# Patient Record
Sex: MALE | Race: BLACK OR AFRICAN AMERICAN | NOT HISPANIC OR LATINO | Employment: OTHER | URBAN - METROPOLITAN AREA
[De-identification: names, ages, dates, MRNs, and addresses within clinical notes are randomized per-mention and may not be internally consistent; named-entity substitution may affect disease eponyms.]

---

## 2020-07-01 ENCOUNTER — HOSPITAL ENCOUNTER (OUTPATIENT)
Dept: OTHER | Facility: HOSPITAL | Age: 65
Setting detail: RECURRING SERIES
Discharge: HOME | End: 2020-08-06
Attending: EMERGENCY MEDICINE

## 2020-07-27 ENCOUNTER — HOSPITAL ENCOUNTER (OUTPATIENT)
Dept: MRI IMAGING | Facility: HOSPITAL | Age: 65
Discharge: HOME | End: 2020-07-27
Attending: EMERGENCY MEDICINE

## 2020-08-07 ENCOUNTER — OFFICE VISIT CONVERTED (OUTPATIENT)
Dept: ORTHOPEDIC SURGERY | Facility: CLINIC | Age: 65
End: 2020-08-07
Attending: ORTHOPAEDIC SURGERY

## 2020-08-28 ENCOUNTER — HOSPITAL ENCOUNTER (OUTPATIENT)
Dept: PREADMISSION TESTING | Facility: HOSPITAL | Age: 65
Discharge: HOME | End: 2020-08-28
Attending: ORTHOPAEDIC SURGERY

## 2020-08-29 LAB — SARS-COV-2 RNA SPEC QL NAA+PROBE: NOT DETECTED

## 2020-09-02 ENCOUNTER — HOSPITAL ENCOUNTER (OUTPATIENT)
Dept: PERIOP | Facility: HOSPITAL | Age: 65
Setting detail: HOSPITAL OUTPATIENT SURGERY
Discharge: HOME | End: 2020-09-02
Attending: ORTHOPAEDIC SURGERY

## 2020-09-02 LAB
ANION GAP SERPL CALC-SCNC: 15 MMOL/L (ref 8–19)
BUN SERPL-MCNC: 14 MG/DL (ref 5–25)
BUN/CREAT SERPL: 10 {RATIO} (ref 6–20)
CALCIUM SERPL-MCNC: 9.7 MG/DL (ref 8.7–10.4)
CHLORIDE SERPL-SCNC: 99 MMOL/L (ref 99–111)
CONV CO2: 28 MMOL/L (ref 22–32)
CREAT UR-MCNC: 1.4 MG/DL (ref 0.7–1.2)
GFR SERPLBLD BASED ON 1.73 SQ M-ARVRAT: 53 ML/MIN/{1.73_M2}
GLUCOSE SERPL-MCNC: 99 MG/DL (ref 70–99)
OSMOLALITY SERPL CALC.SUM OF ELEC: 287 MOSM/KG (ref 273–304)
POTASSIUM SERPL-SCNC: 3.9 MMOL/L (ref 3.5–5.3)
SODIUM SERPL-SCNC: 138 MMOL/L (ref 135–147)

## 2020-09-11 ENCOUNTER — HOSPITAL ENCOUNTER (OUTPATIENT)
Dept: OTHER | Facility: HOSPITAL | Age: 65
Setting detail: RECURRING SERIES
Discharge: HOME | End: 2021-01-13
Attending: ORTHOPAEDIC SURGERY

## 2020-09-17 ENCOUNTER — OFFICE VISIT CONVERTED (OUTPATIENT)
Dept: ORTHOPEDIC SURGERY | Facility: CLINIC | Age: 65
End: 2020-09-17
Attending: PHYSICIAN ASSISTANT

## 2020-10-20 ENCOUNTER — OFFICE VISIT CONVERTED (OUTPATIENT)
Dept: ORTHOPEDIC SURGERY | Facility: CLINIC | Age: 65
End: 2020-10-20
Attending: PHYSICIAN ASSISTANT

## 2020-12-01 ENCOUNTER — OFFICE VISIT CONVERTED (OUTPATIENT)
Dept: ORTHOPEDIC SURGERY | Facility: CLINIC | Age: 65
End: 2020-12-01
Attending: PHYSICIAN ASSISTANT

## 2021-01-12 ENCOUNTER — OFFICE VISIT CONVERTED (OUTPATIENT)
Dept: ORTHOPEDIC SURGERY | Facility: CLINIC | Age: 66
End: 2021-01-12
Attending: PHYSICIAN ASSISTANT

## 2021-02-02 ENCOUNTER — CONVERSION ENCOUNTER (OUTPATIENT)
Dept: FAMILY MEDICINE CLINIC | Facility: CLINIC | Age: 66
End: 2021-02-02

## 2021-02-02 ENCOUNTER — OFFICE VISIT CONVERTED (OUTPATIENT)
Dept: FAMILY MEDICINE CLINIC | Facility: CLINIC | Age: 66
End: 2021-02-02
Attending: NURSE PRACTITIONER

## 2021-02-05 ENCOUNTER — OFFICE VISIT CONVERTED (OUTPATIENT)
Dept: PULMONOLOGY | Facility: CLINIC | Age: 66
End: 2021-02-05
Attending: NURSE PRACTITIONER

## 2021-02-15 ENCOUNTER — OFFICE VISIT CONVERTED (OUTPATIENT)
Dept: CARDIOLOGY | Facility: CLINIC | Age: 66
End: 2021-02-15
Attending: INTERNAL MEDICINE

## 2021-02-25 ENCOUNTER — HOSPITAL ENCOUNTER (OUTPATIENT)
Dept: CARDIOLOGY | Facility: HOSPITAL | Age: 66
Discharge: HOME | End: 2021-02-25
Attending: NURSE PRACTITIONER

## 2021-03-03 ENCOUNTER — TELEMEDICINE CONVERTED (OUTPATIENT)
Dept: FAMILY MEDICINE CLINIC | Facility: CLINIC | Age: 66
End: 2021-03-03
Attending: NURSE PRACTITIONER

## 2021-03-17 ENCOUNTER — HOSPITAL ENCOUNTER (OUTPATIENT)
Dept: CT IMAGING | Facility: HOSPITAL | Age: 66
Discharge: HOME | End: 2021-03-17
Attending: NURSE PRACTITIONER

## 2021-03-17 LAB
CREAT BLD-MCNC: 1.5 MG/DL (ref 0.6–1.4)
GFR SERPLBLD BASED ON 1.73 SQ M-ARVRAT: 49 ML/MIN/{1.73_M2}

## 2021-03-24 ENCOUNTER — OFFICE VISIT CONVERTED (OUTPATIENT)
Dept: PULMONOLOGY | Facility: CLINIC | Age: 66
End: 2021-03-24
Attending: INTERNAL MEDICINE

## 2021-04-28 ENCOUNTER — OFFICE VISIT CONVERTED (OUTPATIENT)
Dept: FAMILY MEDICINE CLINIC | Facility: CLINIC | Age: 66
End: 2021-04-28
Attending: NURSE PRACTITIONER

## 2021-04-28 ENCOUNTER — HOSPITAL ENCOUNTER (OUTPATIENT)
Dept: FAMILY MEDICINE CLINIC | Facility: CLINIC | Age: 66
Discharge: HOME | End: 2021-04-28
Attending: NURSE PRACTITIONER

## 2021-04-28 LAB
ALBUMIN SERPL-MCNC: 3.8 G/DL (ref 3.5–5)
ALBUMIN/GLOB SERPL: 1 {RATIO} (ref 1.4–2.6)
ALP SERPL-CCNC: 80 U/L (ref 56–155)
ALT SERPL-CCNC: 40 U/L (ref 10–40)
ANION GAP SERPL CALC-SCNC: 15 MMOL/L (ref 8–19)
APPEARANCE UR: CLEAR
AST SERPL-CCNC: 33 U/L (ref 15–50)
BASOPHILS # BLD AUTO: 0.02 10*3/UL (ref 0–0.2)
BASOPHILS NFR BLD AUTO: 0.3 % (ref 0–3)
BILIRUB SERPL-MCNC: 0.48 MG/DL (ref 0.2–1.3)
BILIRUB UR QL: NEGATIVE
BUN SERPL-MCNC: 16 MG/DL (ref 5–25)
BUN/CREAT SERPL: 12 {RATIO} (ref 6–20)
CALCIUM SERPL-MCNC: 8.9 MG/DL (ref 8.7–10.4)
CHLORIDE SERPL-SCNC: 98 MMOL/L (ref 99–111)
COLOR UR: YELLOW
CONV ABS IMM GRAN: 0.04 10*3/UL (ref 0–0.2)
CONV CO2: 27 MMOL/L (ref 22–32)
CONV COLLECTION SOURCE (UA): NORMAL
CONV IMMATURE GRAN: 0.6 % (ref 0–1.8)
CONV TOTAL PROTEIN: 7.5 G/DL (ref 6.3–8.2)
CONV UROBILINOGEN IN URINE BY AUTOMATED TEST STRIP: 1 {EHRLICHU}/DL (ref 0.1–1)
CREAT UR-MCNC: 1.33 MG/DL (ref 0.7–1.2)
DEPRECATED RDW RBC AUTO: 45.7 FL (ref 35.1–43.9)
EOSINOPHIL # BLD AUTO: 0.17 10*3/UL (ref 0–0.7)
EOSINOPHIL # BLD AUTO: 2.5 % (ref 0–7)
ERYTHROCYTE [DISTWIDTH] IN BLOOD BY AUTOMATED COUNT: 13.1 % (ref 11.6–14.4)
GFR SERPLBLD BASED ON 1.73 SQ M-ARVRAT: 56 ML/MIN/{1.73_M2}
GLOBULIN UR ELPH-MCNC: 3.7 G/DL (ref 2–3.5)
GLUCOSE SERPL-MCNC: 92 MG/DL (ref 70–99)
GLUCOSE UR QL: NEGATIVE MG/DL
HCT VFR BLD AUTO: 40.5 % (ref 42–52)
HGB BLD-MCNC: 12.9 G/DL (ref 14–18)
HGB UR QL STRIP: NEGATIVE
KETONES UR QL STRIP: NEGATIVE MG/DL
LEUKOCYTE ESTERASE UR QL STRIP: NEGATIVE
LYMPHOCYTES # BLD AUTO: 1.46 10*3/UL (ref 1–5)
LYMPHOCYTES NFR BLD AUTO: 21.1 % (ref 20–45)
MCH RBC QN AUTO: 30.3 PG (ref 27–31)
MCHC RBC AUTO-ENTMCNC: 31.9 G/DL (ref 33–37)
MCV RBC AUTO: 95.1 FL (ref 80–96)
MONOCYTES # BLD AUTO: 0.84 10*3/UL (ref 0.2–1.2)
MONOCYTES NFR BLD AUTO: 12.1 % (ref 3–10)
NEUTROPHILS # BLD AUTO: 4.39 10*3/UL (ref 2–8)
NEUTROPHILS NFR BLD AUTO: 63.4 % (ref 30–85)
NITRITE UR QL STRIP: NEGATIVE
NRBC CBCN: 0 % (ref 0–0.7)
OSMOLALITY SERPL CALC.SUM OF ELEC: 283 MOSM/KG (ref 273–304)
PH UR STRIP.AUTO: 6.5 [PH] (ref 5–8)
PLATELET # BLD AUTO: 250 10*3/UL (ref 130–400)
PMV BLD AUTO: 10.3 FL (ref 9.4–12.4)
POTASSIUM SERPL-SCNC: 3.9 MMOL/L (ref 3.5–5.3)
PROT UR QL: NORMAL MG/DL
RBC # BLD AUTO: 4.26 10*6/UL (ref 4.7–6.1)
SODIUM SERPL-SCNC: 136 MMOL/L (ref 135–147)
SP GR UR: 1.02 (ref 1–1.03)
WBC # BLD AUTO: 6.92 10*3/UL (ref 4.8–10.8)

## 2021-04-29 LAB
T4 FREE SERPL-MCNC: 1.1 NG/DL (ref 0.9–1.8)
TSH SERPL-ACNC: 0.58 M[IU]/L (ref 0.27–4.2)

## 2021-04-30 LAB — BACTERIA UR CULT: NORMAL

## 2021-05-10 NOTE — H&P
History and Physical      Patient Name: Facundo Hinds   Patient ID: [id number]   Sex: Male   YOB: 1955    Primary Care Provider: Bela Coffman    Visit Date: August 7, 2020    Provider: Claude Vela MD   Location: [facility]   Location Address: [address] [place] [address]  [id number]   Location Phone: [phone]          Chief Complaint  · Left Shoulder Injury      History Of Present Illness  Facundo Hinds is a 65 year old /Black male who presents for evaluation of his left shoulder. He fell at work on 06/18/2020 and had resultant pain. X-rays were negative, but MRI showed a rotator cuff tear. He has tried therapy. He cannot take anti-inflammatories because of his kidneys.       Past Medical History  Cancer; EKG abnormalities; Hypertension; Kidney disorder         Past Surgical History  Colonoscopy; Kidney; Testicle Surgery         Medication List  Depo-Testosterone 200 mg/mL intramuscular oil; lisinopril-hydrochlorothiazide 20-12.5 mg oral tablet; MoviPrep 100-7.5-2.691 gram oral powder in packet         Allergy List  NO KNOWN DRUG ALLERGIES         Family Medical History  Cancer, Unspecified; - No Family History of Colorectal Cancer; Family history of certain chronic disabling diseases; arthritis; Family history of Arthritis         Social History  Alcohol Use (Never); lives with spouse; .; Recreational Drug Use (Never); Tobacco (Never); Working         Review of Systems  · Constitutional  o Denies  o : fever, chills, weight loss  · Cardiovascular  o Denies  o : chest pain, shortness of breath  · Gastrointestinal  o Denies  o : liver disease, heartburn, nausea, blood in stools  · Genitourinary  o Denies  o : painful urination, blood in urine  · Integument  o Denies  o : rash, itching  · Neurologic  o Denies  o : headache, weakness, loss of consciousness  · Musculoskeletal  o Admits  o : painful, swollen joints  · Psychiatric  o Denies  o : drug/alcohol  "addiction, anxiety, depression      Vitals  Date Time BP Position Site L\R Cuff Size HR RR TEMP (F) WT  HT  BMI kg/m2 BSA m2 O2 Sat HC       08/07/2020 10:23 AM      74 - R   260lbs 2oz 5'  10\" 37.32 2.41 97 %          Physical Examination  · Constitutional  o Appearance  o : well developed, well-nourished, no obvious deformities present  · Head and Face  o Head  o :   § Inspection  § : normocephalic  o Face  o :   § Inspection  § : no facial lesions  · Eyes  o Conjunctivae  o : conjunctivae normal  o Sclerae  o : sclerae white  · Ears, Nose, Mouth and Throat  o Ears  o :   § External Ears  § : appearance within normal limits  § Hearing  § : intact  o Nose  o :   § External Nose  § : appearance normal  · Neck  o Inspection/Palpation  o : normal appearance  o Range of Motion  o : full range of motion  · Respiratory  o Respiratory Effort  o : breathing unlabored  o Inspection of Chest  o : normal appearance  o Auscultation of Lungs  o : no audible wheezing or rales  · Cardiovascular  o Heart  o : regular rate  · Gastrointestinal  o Abdominal Examination  o : soft and non-tender  · Skin and Subcutaneous Tissue  o General Inspection  o : intact, no rashes  · Psychiatric  o General  o : Alert and oriented x3  o Judgement and Insight  o : judgment and insight intact  o Mood and Affect  o : mood normal, affect appropriate  · Left Shoulder  o Inspection  o : Decreased range of motion. Weakness rotator cuff testing. Some olecranon bursitis. Sensation intact. Pulses normal.   · Imaging  o Imaging  o : MRI performed at [facility] on 07/27/2020 revealed: 1) Full-thickness tear of supraspinatus tendon with 1.1 cm tendon retraction. Full-thickness tear of the infraspinatus tendon with 1.6 cm tendon retraction. The resulting gap is estimated to measure 3.9 cm AP. There is mild fatty atrophy of the infraspinatus muscle body; 2) Mild supraspinatus and infraspinatus tendinopathy noted. Moderate-to-severe subscapularis " tendinopathy noted. Full-thickness tear involving the inferior fibers of the tendon; 3) Mild AC joint osteoarthritis noted. Cartilage in the glenohumeral joint diffusely thinned but intact. No loose body seen. Moderate glenohumeral joint effusion present.           Assessment  · Left shoulder pain, unspecified chronicity     719.41/M25.512  · Traumatic complete tear of left rotator cuff, initial encounter     840.4/S46.012A      Plan  · Medications  o Medications have been Reconciled  o Transition of Care or Provider Policy  · Instructions  o Reviewed the patient's Past Medical, Social, and Family history as well as the ROS at today's visit, no changes.  o Call or return if worsening symptoms.  o Discussed surgery.  o Risks and benefits discussed with the patient include, but are not limited to, infection, bleeding, death, blood clots, lung problems, heart attacks, possible damage to neurovascular structures, aesthetic deformity, and possible need for future surgeries, among others. Patient understands the risks and benefits, and wishes to proceed. Patient will follow up in the clinic postoperatively.   o Surgery pamphlet given.  o This note was transcribed by Bela Peña mc.            Electronically Signed by: Bela Peña-, Other -Author on August 11, 2020 06:21:45 AM  Electronically Co-signed by: Claude Vela MD -Reviewer on August 11, 2020 03:56:10 PM

## 2021-05-12 ENCOUNTER — CONVERSION ENCOUNTER (OUTPATIENT)
Dept: FAMILY MEDICINE CLINIC | Facility: CLINIC | Age: 66
End: 2021-05-12

## 2021-05-12 ENCOUNTER — OFFICE VISIT CONVERTED (OUTPATIENT)
Dept: FAMILY MEDICINE CLINIC | Facility: CLINIC | Age: 66
End: 2021-05-12
Attending: NURSE PRACTITIONER

## 2021-05-13 NOTE — PROGRESS NOTES
Progress Note      Patient Name: Facundo Hinds   Patient ID: [id number]   Sex: Male   YOB: 1955    Primary Care Provider: Bela Coffman    Visit Date: October 20, 2020    Provider: Yolanda Perez PA-C   Location: [facility]   Location Address: [address] [place] [address]  [id number]   Location Phone: [phone]          Chief Complaint  · Follow up left shoulder arthroscopy      History Of Present Illness  Facundo Hinds is a 65 year old /Black male who presents today to [facility].      He is s/p left SAD/DC and mini open RCR 9/2/20 by Dr. Vela. He states pain is controlled with Tylenol. He has started PT. He is compliant with PT. He is making steady progress. He is retired, but this was a work injury.            Past Medical History  Cancer; EKG abnormalities; Hypertension; Kidney disorder         Past Surgical History  Colonoscopy; Kidney; Testicle Surgery         Medication List  Depo-Testosterone 200 mg/mL intramuscular oil; lisinopril-hydrochlorothiazide 20-12.5 mg oral tablet; MoviPrep 100-7.5-2.691 gram oral powder in packet         Allergy List  NO KNOWN DRUG ALLERGIES       Allergies Reconciled  Family Medical History  Cancer, Unspecified; - No Family History of Colorectal Cancer; Family history of certain chronic disabling diseases; arthritis; Family history of Arthritis         Social History  Alcohol Use (Never); lives with spouse; .; Recreational Drug Use (Never); Tobacco (Never); Working         Review of Systems  · Constitutional  o Denies  o : fever, chills, weight loss  · Cardiovascular  o Denies  o : chest pain, shortness of breath  · Gastrointestinal  o Denies  o : liver disease, heartburn, nausea, blood in stools  · Genitourinary  o Denies  o : painful urination, blood in urine  · Integument  o Denies  o : rash, itching  · Neurologic  o Denies  o : headache, weakness, loss of consciousness  · Musculoskeletal  o Admits  o :  "painful, swollen joints  · Psychiatric  o Denies  o : drug/alcohol addiction, anxiety, depression      Vitals  Date Time BP Position Site L\R Cuff Size HR RR TEMP (F) WT  HT  BMI kg/m2 BSA m2 O2 Sat FR L/min FiO2 HC       10/20/2020 08:23 AM      74 - R   263lbs 0oz 5'  10\" 37.74 2.43 95 %            Physical Examination  · Constitutional  o Appearance  o : well developed, well-nourished, no obvious deformities present  · Head and Face  o Head  o :   § Inspection  § : normocephalic  o Face  o :   § Inspection  § : no facial lesions  · Eyes  o Conjunctivae  o : conjunctivae normal  o Sclerae  o : sclerae white  · Ears, Nose, Mouth and Throat  o Ears  o :   § External Ears  § : appearance within normal limits  § Hearing  § : intact  o Nose  o :   § External Nose  § : appearance normal  · Neck  o Inspection/Palpation  o : normal appearance  o Range of Motion  o : full range of motion  · Respiratory  o Respiratory Effort  o : breathing unlabored  o Inspection of Chest  o : normal appearance  o Auscultation of Lungs  o : no audible wheezing or rales  · Cardiovascular  o Heart  o : regular rate  · Gastrointestinal  o Abdominal Examination  o : soft and non-tender  · Skin and Subcutaneous Tissue  o General Inspection  o : intact, no rashes  · Psychiatric  o General  o : Alert and oriented x3  o Judgement and Insight  o : judgment and insight intact  o Mood and Affect  o : mood normal, affect appropriate  · Left Shoulder  o Inspection  o : Well healed arthroscopic incisions. Forward flexion 80 degrees. Abduction 75 degrees. ER 30 degrees. IR to back pocket. Strength 3/5. Neurovascularly intact.          Assessment  · Left Aftercare following surgery of the muskuloskeletal system     V54.81      Plan  · Medications  o Medications have been Reconciled  o Transition of Care or Provider Policy  · Instructions  o Reviewed the patient's Past Medical, Social, and Family history as well as the ROS at today's visit, no " changes.  o Call or return if worsening symptoms.  o continue PT. May d/c sling. Discussed precautions. Follow up in 6 weeks.  o Electronically Identified Patient Education Materials Provided Electronically            Electronically Signed by: ANGEL Mccoy-PENNY -Author on October 20, 2020 08:34:06 AM  Electronically Co-signed by: Claude Vela MD -Reviewer on October 22, 2020 07:47:52 AM

## 2021-05-13 NOTE — PROGRESS NOTES
Progress Note      Patient Name: Facundo Hinds   Patient ID: [id number]   Sex: Male   YOB: 1955    Primary Care Provider: Bela Coffman   Referring Provider: Claude Vela MD    Visit Date: September 17, 2020    Provider: Yolanda Perez PA-C   Location: [facility]   Location Address: [address] [place] [address]  [id number]   Location Phone: [phone]          Chief Complaint  · Surgery follow up left shoulder arthroscopy      History Of Present Illness  Facundo Hinds is a 65 year old /Black male who presents today to [facility].      He is s/p left SAD/DC and mini open RCR 9/2/20 by Dr. Vela. He states pain is controlled with Tylenol. He has started PT. he is compliant with PT.       Past Medical History  Cancer; EKG abnormalities; Hypertension; Kidney disorder         Past Surgical History  Colonoscopy; Kidney; Testicle Surgery         Medication List  Depo-Testosterone 200 mg/mL intramuscular oil; lisinopril-hydrochlorothiazide 20-12.5 mg oral tablet; MoviPrep 100-7.5-2.691 gram oral powder in packet         Allergy List  NO KNOWN DRUG ALLERGIES       Allergies Reconciled  Family Medical History  Cancer, Unspecified; - No Family History of Colorectal Cancer; Family history of certain chronic disabling diseases; arthritis; Family history of Arthritis         Social History  Alcohol Use (Never); lives with spouse; .; Recreational Drug Use (Never); Tobacco (Never); Working         Review of Systems  · Constitutional  o Denies  o : fever, chills, weight loss  · Cardiovascular  o Denies  o : chest pain, shortness of breath  · Gastrointestinal  o Denies  o : liver disease, heartburn, nausea, blood in stools  · Genitourinary  o Denies  o : painful urination, blood in urine  · Integument  o Denies  o : rash, itching  · Neurologic  o Denies  o : headache, weakness, loss of consciousness  · Musculoskeletal  o Admits  o : painful, swollen  "joints  · Psychiatric  o Denies  o : drug/alcohol addiction, anxiety, depression      Vitals  Date Time BP Position Site L\R Cuff Size HR RR TEMP (F) WT  HT  BMI kg/m2 BSA m2 O2 Sat        09/17/2020 09:17 AM      74 - R   255lbs 0oz 5'  10\" 36.59 2.39 92 %          Physical Examination  · Constitutional  o Appearance  o : well developed, well-nourished, no obvious deformities present  · Head and Face  o Head  o :   § Inspection  § : normocephalic  o Face  o :   § Inspection  § : no facial lesions  · Eyes  o Conjunctivae  o : conjunctivae normal  o Sclerae  o : sclerae white  · Ears, Nose, Mouth and Throat  o Ears  o :   § External Ears  § : appearance within normal limits  § Hearing  § : intact  o Nose  o :   § External Nose  § : appearance normal  · Neck  o Inspection/Palpation  o : normal appearance  o Range of Motion  o : full range of motion  · Respiratory  o Respiratory Effort  o : breathing unlabored  o Inspection of Chest  o : normal appearance  o Auscultation of Lungs  o : no audible wheezing or rales  · Cardiovascular  o Heart  o : regular rate  · Gastrointestinal  o Abdominal Examination  o : soft and non-tender  · Skin and Subcutaneous Tissue  o General Inspection  o : intact, no rashes  · Psychiatric  o General  o : Alert and oriented x3  o Judgement and Insight  o : judgment and insight intact  o Mood and Affect  o : mood normal, affect appropriate  · Left Shoulder  o Inspection  o : Well approximated incisions. No signs of infection. All compartments soft and well perfused. Sensation grossly intact. Full ROM of elbow, wrist, digits.               Assessment  · Left Aftercare following surgery of the muskuloskeletal system     V54.81      Plan  · Medications  o Medications have been Reconciled  o Transition of Care or Provider Policy  · Instructions  o Sutures removed in clinic today.  o Reviewed the patient's Past Medical, Social, and Family history as well as the ROS at today's visit, no " changes.  o Call or return if worsening symptoms.  o Continue sling. Continue PT. Follow up 4 weeks.   o Electronically Identified Patient Education Materials Provided Electronically            Electronically Signed by: ANGEL Mccoy-PENNY -Author on September 17, 2020 09:37:07 AM  Electronically Co-signed by: Claude Vela MD -Reviewer on September 17, 2020 12:38:01 PM

## 2021-05-13 NOTE — PROGRESS NOTES
Progress Note      Patient Name: Facundo Hinds   Patient ID: [id number]   Sex: Male   YOB: 1955    Primary Care Provider: Bela Coffman   Referring Provider: Claude Vela MD    Visit Date: December 1, 2020    Provider: Yolanda Perez PA-C   Location: [facility]   Location Address: [address] [place] [address]  [id number]   Location Phone: [phone]          Chief Complaint  · Follow up left shoulder arthroscopy      History Of Present Illness  Facundo Hinds is a 65 year old /Black male who presents today to [facility].      He is s/p left SAD/DC and mini open RCR 9/2/20 by Dr. Vela. He states pain is controlled with Tylenol. He has started PT. He is compliant with PT. He states he is making progress, but has stiffness and weakness. He is retired, but this was a work injury.                 Past Medical History  Cancer; EKG abnormalities; Hypertension; Kidney disorder         Past Surgical History  Colonoscopy; Kidney; Testicle Surgery         Medication List  Depo-Testosterone 200 mg/mL intramuscular oil; lisinopril-hydrochlorothiazide 20-12.5 mg oral tablet         Allergy List  NO KNOWN DRUG ALLERGIES       Allergies Reconciled  Family Medical History  Cancer, Unspecified; - No Family History of Colorectal Cancer; Family history of certain chronic disabling diseases; arthritis; Family history of Arthritis         Social History  Alcohol Use (Never); lives with spouse; .; Recreational Drug Use (Never); Tobacco (Never); Working         Review of Systems  · Constitutional  o Denies  o : fever, chills, weight loss  · Cardiovascular  o Denies  o : chest pain, shortness of breath  · Gastrointestinal  o Denies  o : liver disease, heartburn, nausea, blood in stools  · Genitourinary  o Denies  o : painful urination, blood in urine  · Integument  o Denies  o : rash, itching  · Neurologic  o Denies  o : headache, weakness, loss of  "consciousness  · Musculoskeletal  o Admits  o : painful, swollen joints  · Psychiatric  o Denies  o : drug/alcohol addiction, anxiety, depression      Vitals  Date Time BP Position Site L\R Cuff Size HR RR TEMP (F) WT  HT  BMI kg/m2 BSA m2 O2 Sat FR L/min FiO2 HC       12/01/2020 08:46 AM      69 - R   260lbs 0oz 5'  10\" 37.31 2.41 96 %            Physical Examination  · Constitutional  o Appearance  o : well developed, well-nourished, no obvious deformities present  · Head and Face  o Head  o :   § Inspection  § : normocephalic  o Face  o :   § Inspection  § : no facial lesions  · Eyes  o Conjunctivae  o : conjunctivae normal  o Sclerae  o : sclerae white  · Ears, Nose, Mouth and Throat  o Ears  o :   § External Ears  § : appearance within normal limits  § Hearing  § : intact  o Nose  o :   § External Nose  § : appearance normal  · Neck  o Inspection/Palpation  o : normal appearance  o Range of Motion  o : full range of motion  · Respiratory  o Respiratory Effort  o : breathing unlabored  o Inspection of Chest  o : normal appearance  o Auscultation of Lungs  o : no audible wheezing or rales  · Cardiovascular  o Heart  o : regular rate  · Gastrointestinal  o Abdominal Examination  o : soft and non-tender  · Skin and Subcutaneous Tissue  o General Inspection  o : intact, no rashes  · Psychiatric  o General  o : Alert and oriented x3  o Judgement and Insight  o : judgment and insight intact  o Mood and Affect  o : mood normal, affect appropriate  · Left Shoulder  o Inspection  o : Well healed arthroscopic incisions. Forward flexion 90 degrees. Abduction 80 degrees. ER 60 degrees. IR to back pocket. Strength 3/5. Neurovascularly intact.           Assessment  · Left Aftercare following surgery of the muskuloskeletal system     V54.81      Plan  · Medications  o Medications have been Reconciled  o Transition of Care or Provider Policy  · Instructions  o Reviewed the patient's Past Medical, Social, and Family history as " well as the ROS at today's visit, no changes.  o Call or return if worsening symptoms.  o Continue PT. Follow up 6 weeks.   o Electronically Identified Patient Education Materials Provided Electronically            Electronically Signed by: ANGEL Mccoy-PENNY -Author on December 1, 2020 09:08:51 AM  Electronically Co-signed by: Claude Vela MD -Reviewer on December 1, 2020 01:10:11 PM

## 2021-05-14 VITALS
SYSTOLIC BLOOD PRESSURE: 120 MMHG | HEIGHT: 70 IN | BODY MASS INDEX: 35.66 KG/M2 | DIASTOLIC BLOOD PRESSURE: 74 MMHG | TEMPERATURE: 96.1 F | OXYGEN SATURATION: 95 % | HEART RATE: 68 BPM | WEIGHT: 249.12 LBS

## 2021-05-14 VITALS — OXYGEN SATURATION: 92 % | HEART RATE: 74 BPM | HEIGHT: 70 IN | WEIGHT: 255 LBS | BODY MASS INDEX: 36.51 KG/M2

## 2021-05-14 VITALS — BODY MASS INDEX: 37.65 KG/M2 | HEART RATE: 74 BPM | HEIGHT: 70 IN | WEIGHT: 263 LBS | OXYGEN SATURATION: 95 %

## 2021-05-14 VITALS — HEIGHT: 70 IN | BODY MASS INDEX: 37.22 KG/M2 | HEART RATE: 69 BPM | OXYGEN SATURATION: 96 % | WEIGHT: 260 LBS

## 2021-05-14 VITALS
BODY MASS INDEX: 36.12 KG/M2 | SYSTOLIC BLOOD PRESSURE: 128 MMHG | HEIGHT: 70 IN | WEIGHT: 252.31 LBS | DIASTOLIC BLOOD PRESSURE: 74 MMHG | HEART RATE: 76 BPM

## 2021-05-14 VITALS
SYSTOLIC BLOOD PRESSURE: 102 MMHG | DIASTOLIC BLOOD PRESSURE: 60 MMHG | HEART RATE: 68 BPM | TEMPERATURE: 97.9 F | WEIGHT: 240.5 LBS | HEIGHT: 70 IN | OXYGEN SATURATION: 96 % | BODY MASS INDEX: 34.43 KG/M2

## 2021-05-14 VITALS — BODY MASS INDEX: 37.65 KG/M2 | OXYGEN SATURATION: 96 % | HEIGHT: 70 IN | WEIGHT: 263 LBS | HEART RATE: 74 BPM

## 2021-05-14 NOTE — PROGRESS NOTES
Progress Note      Patient Name: Facundo Hinds   Patient ID: [id number]   Sex: Male   YOB: 1955    Primary Care Provider: Bela Coffman    Visit Date: January 12, 2021    Provider: Yolanda Perez PA-C   Location: [facility]   Location Address: [address] [place] [address]  [id number]   Location Phone: [phone]          Chief Complaint  · Follow up left shoulder arthroscopy      History Of Present Illness  Facundo Hinds is a 65 year old /Black male who presents today to [facility].      He is s/p left SAD/DC and mini open RCR 9/2/20 by Dr. Vela. He states pain is minimal. His motion and strength are improved. He is pleased with his outcome.       Past Medical History  Cancer; EKG abnormalities; Hypertension; Kidney disorder         Past Surgical History  Colonoscopy; Kidney; Testicle Surgery         Medication List  Depo-Testosterone 200 mg/mL intramuscular oil; lisinopril-hydrochlorothiazide 20-12.5 mg oral tablet         Allergy List  NO KNOWN DRUG ALLERGIES       Allergies Reconciled  Family Medical History  Cancer, Unspecified; - No Family History of Colorectal Cancer; Family history of certain chronic disabling diseases; arthritis; Family history of Arthritis         Social History  Alcohol Use (Never); lives with spouse; .; Recreational Drug Use (Never); Tobacco (Never); Working         Review of Systems  · Constitutional  o Denies  o : fever, chills, weight loss  · Cardiovascular  o Denies  o : chest pain, shortness of breath  · Gastrointestinal  o Denies  o : liver disease, heartburn, nausea, blood in stools  · Genitourinary  o Denies  o : painful urination, blood in urine  · Integument  o Denies  o : rash, itching  · Neurologic  o Denies  o : headache, weakness, loss of consciousness  · Musculoskeletal  o Admits  o : painful, swollen joints  · Psychiatric  o Denies  o : drug/alcohol addiction, anxiety, depression      Vitals  Date  "Time BP Position Site L\R Cuff Size HR RR TEMP (F) WT  HT  BMI kg/m2 BSA m2 O2 Sat FR L/min FiO2 HC       01/12/2021 01:22 PM      74 - R   263lbs 0oz 5'  10\" 37.74 2.43 96 %            Physical Examination  · Constitutional  o Appearance  o : well developed, well-nourished, no obvious deformities present  · Head and Face  o Head  o :   § Inspection  § : normocephalic  o Face  o :   § Inspection  § : no facial lesions  · Eyes  o Conjunctivae  o : conjunctivae normal  o Sclerae  o : sclerae white  · Ears, Nose, Mouth and Throat  o Ears  o :   § External Ears  § : appearance within normal limits  § Hearing  § : intact  o Nose  o :   § External Nose  § : appearance normal  · Neck  o Inspection/Palpation  o : normal appearance  o Range of Motion  o : full range of motion  · Respiratory  o Respiratory Effort  o : breathing unlabored  o Inspection of Chest  o : normal appearance  o Auscultation of Lungs  o : no audible wheezing or rales  · Cardiovascular  o Heart  o : regular rate  · Gastrointestinal  o Abdominal Examination  o : soft and non-tender  · Skin and Subcutaneous Tissue  o General Inspection  o : intact, no rashes  · Psychiatric  o General  o : Alert and oriented x3  o Judgement and Insight  o : judgment and insight intact  o Mood and Affect  o : mood normal, affect appropriate  · Left Shoulder  o Inspection  o : Well healed arthroscopic incisions. Forward flexion 160 degrees. Abduction 150 degrees. ER 75 degrees. IR to T10. Strength 4+/5. Neurovascularly intact.           Assessment  · Left Aftercare following surgery of the muskuloskeletal system     V54.81  · History of repair of left rotator cuff     V15.29/Z98.890      Plan  · Medications  o Medications have been Reconciled  o Transition of Care or Provider Policy  · Instructions  o Reviewed the patient's Past Medical, Social, and Family history as well as the ROS at today's visit, no changes.  o Call or return if worsening symptoms.  o Continue HEP as " provided by PT. Follow up PRN.   o Electronically Identified Patient Education Materials Provided Electronically            Electronically Signed by: Yolanda Perez PA-C -Author on January 12, 2021 01:29:52 PM

## 2021-05-14 NOTE — PROGRESS NOTES
Progress Note      Patient Name: Facundo Hinds   Patient ID: [id number]   Sex: Male   YOB: 1955    Primary Care Provider: Shailesh FIELD    Visit Date: February 2, 2021    Provider: RASHIDA Pugh   [facility] [facility]   Location Address: [address] [place] [address], [address] [place] [address]  [id number]   Location Phone: [phone]          Chief Complaint  · NEW PT - EST CARE      History Of Present Illness  Facundo Hinds is a 65 year old /Black male who presents for evaluation and treatment of:      Presents today to establish care.  Previous PCP is Dr. Beltran at [facility].  He has a history of hypertension, seasonal allergies, and a pulmonary saddle embolism.  He uses Flonase daily, takes lisinopril hydrochlorothiazide for his blood pressure.  BP today in office is well controlled 120/74.  Denies chest pain, syncope, palpitations, and shortness of breath.  He was admitted to [facility] for a pulmonary embolism.  While he was there he received TPA.  He was discharged on 1/22/2021.  He has a follow-up appointment with pulmonologist this week and cardiologist in two weeks.       Past Medical History  Cancer; Deafness; EKG abnormalities; Hemorrhoids; Hypertension; Kidney disorder; Night sweats; Shortness of Breath; Sinus trouble         Past Surgical History  Colonoscopy; Kidney; Testicle Surgery         Medication List  Eliquis 5 mg oral tablet; Flonase Allergy Relief 50 mcg/actuation nasal spray,suspension; lisinopril-hydrochlorothiazide 20-12.5 mg oral tablet         Allergy List  NO KNOWN DRUG ALLERGIES       Allergies Reconciled  Family Medical History  Cancer, Unspecified; - No Family History of Colorectal Cancer; Family history of certain chronic disabling diseases; arthritis; Family history of Arthritis         Social History  Alcohol Use (Never); lives with spouse; .; Recreational Drug Use (Never); Tobacco (Never); Working  "        Immunizations  No Pertinent Immunization History         Review of Systems  · Constitutional  o Denies  o : fatigue, fever, chills  · Eyes  o Denies  o : blurred vision, changes in vision  · HENT  o Denies  o : headaches, vertigo, lightheadedness  · Cardiovascular  o Denies  o : chest pain, irregular heart beats, rapid heart rate, dyspnea on exertion  · Respiratory  o Denies  o : shortness of breath, wheezing, cough  · Gastrointestinal  o Denies  o : nausea, vomiting, diarrhea, constipation, abdominal pain, blood in stools, melena  · Genitourinary  o Denies  o : frequency, dysuria, hematuria  · Integument  o Denies  o : rash, new skin lesions  · Musculoskeletal  o Denies  o : joint pain, joint swelling, muscle pain  · Endocrine  o Admits  o : central obesity  o Denies  o : polyuria, polydipsia      Vitals  Date Time BP Position Site L\R Cuff Size HR RR TEMP (F) WT  HT  BMI kg/m2 BSA m2 O2 Sat FR L/min FiO2 HC       02/02/2021 02:11 /74 Sitting    68 - R  96.1 249lbs 2oz 5'  10\" 35.75 2.36 95 %            Physical Examination  · Constitutional  o Appearance  o : obese, alert, in no acute distress  · Head and Face  o Head  o :   § Inspection  § : atraumatic, normocephalic  o Face  o :   § Inspection  § : no facial lesions  o HEENT  o : Unremarkable  · Eyes  o Conjunctivae  o : conjunctivae normal  o Sclerae  o : sclerae white  o Pupils and Irises  o : pupils equal and round, pupils reactive to light bilaterally  o Eyelids/Ocular Adnexae  o : eyelid appearance normal  · Ears, Nose, Mouth and Throat  o Ears  o :   § External Ears  § : appearance within normal limits, no lesions present  o Nose  o :   § External Nose  § : appearance normal  o Oral Cavity  o :   § Oral Mucosa  § : oral mucosa normal  § Lips  § : lip appearance normal  § Teeth  § : normal dentition for age  § Gums  § : gums pink, non-swollen, no bleeding present  § Tongue  § : tongue appearance normal  § Palate  § : hard palate normal, soft " "palate appearance normal  · Neck  o Inspection/Palpation  o : normal appearance, no masses or tenderness, trachea midline  o Thyroid  o : gland size normal, nontender, no nodules or masses present on palpation  · Respiratory  o Respiratory Effort  o : breathing unlabored  o Auscultation of Lungs  o : normal breath sounds  · Cardiovascular  o Heart  o :   § Auscultation of Heart  § : regular rate, normal rhythm, no murmurs present  o Peripheral Vascular System  o :   § Extremities  § : no edema  · Gastrointestinal  o Abdominal Examination  o : abdomen nontender to palpation, normal bowel sounds, tone normal without rigidity or guarding, no masses present, abdominal obesity present, abdomen scaphoid upon supine  o Liver and spleen  o : no hepatomegaly present  · Lymphatic  o Neck  o : no lymphadenopathy           Assessment  · Allergic rhinitis due to allergen     477.9/J30.9  · Essential hypertension     401.9/I10  · Pulmonary embolism     415.19/I26.99  Follow-up appointment with pulmonology and cardiology      Plan  · Orders  o ACO-18: Negative screen for clinical depression using a standardized tool () - - 02/02/2021   0 POINTS  o ACO-15: Pneumococcal Vaccine Administered or Previously Received [facility] (4040F) - - 02/02/2021  o ACO-19: Colorectal cancer screening results documented and reviewed (3017F) - - 02/02/2021  o ACO-39: Current medications updated and reviewed (1159F, ) - - 02/02/2021  · Medications  o Medications have been Reconciled  o Transition of Care or Provider Policy  · Instructions  o Patient was educated/instructed on their diagnosis, treatment and medications prior to discharge from the clinic today.  o Patient instructed to seek medical attention urgently for new or worsening symptoms.  o Call the office with any concerns or questions.  · Disposition  o Call or Return if symptoms worsen or persist.  o f/u 1 month  · Associate Tasks  o Task ID [id number] \"''Provider to Front Office: "             Electronically Signed by: RASHIDA Pugh -Author on February 2, 2021 03:17:37 PM

## 2021-05-14 NOTE — PROGRESS NOTES
"   Progress Note      Patient Name: Facundo Hinds   Patient ID: [id number]   Sex: Male   YOB: 1955    Primary Care Provider: Shailesh FIELD    Visit Date: February 15, 2021    Provider: Canelo Rossi MD   Location: [facility]   Location Address: [address] [place] [address]  [id number]          History Of Present Illness  Facundo Hinds is a 65 year old /Black male who I saw in the office today for followup evaluation and management of recent hospitalization with acute pulmonary embolism with right ventricular dysfunction. I saw Facundo in the hospital in consultation 01/20/2021. At that time there was some question whether he had a right atrial or right ventricular mass. Followup echocardiogram did not show any evidence of thrombus. His right ventricular function had recovered quite well since TPA and anticoagulation early in his hospital stay. Since discharge he is doing well. He has no complaints. He denies chest pain or excessive shortness of breath. He is compliant with his medication and he has not had any bleeding problems on anticoagulation.   PAST MEDICAL HISTORY: Hypertension; History of pulmonary embolism.   PSYCHOSOCIAL HISTORY: Denies drinking alcohol.   CURRENT MEDICATIONS: Eliquis 5 mg b.i.d.; lisinopril/hydrochlorothiazide 20/25 mg daily. The dosage and frequency of the medications were reviewed with the patient.      ALLERGIES:  No known drug allergies.       Review of Systems  · Cardiovascular  o Denies  o : palpitations (fast, fluttering, or skipping beats), swelling (feet, ankles, hands), shortness of breath while walking or lying flat, chest pain or angina pectoris   · Respiratory  o Denies  o : chronic or frequent cough      Vitals  Date Time BP Position Site L\R Cuff Size HR RR TEMP (F) WT  HT  BMI kg/m2 BSA m2 O2 Sat FR L/min FiO2 HC       02/15/2021 12:25 /74 Sitting    76 - R   252lbs 5oz 5'  10\" 36.2 2.38    "          Physical Examination  · Constitutional  o Appearance  o : Obese  male, pleasant, in no acute distress.  · Respiratory  o Auscultation of Lungs  o : Clear to auscultation bilaterally. No crackles or rhonchi.  · Cardiovascular  o Heart  o : S1, S2 is normally heard. No S3. No murmur, rubs, or gallops.  · Gastrointestinal  o Abdominal Examination  o : Soft, nontender, nondistended. No free fluid. Bowel sounds heard in all four quadrants.  · Extremities  o Extremities  o : Warm and well perfused. No pitting pedal edema. Distal pulses present.     I reviewed his hospital records.           Assessment     1.  Recent acute submassive pulmonary embolism, status post TPA and anticoagulation - The patient        recovered quite well from this with early improvement in his right ventricular function.  There was no        evidence of intracardiac mass.  He is stable on anticoagulation. I recommend lifelong anticoagulation in this        patient.   2.  Hypertension - Controlled.          Plan     Continue current medical therapy.  He will be followed as needed in my office unless problems arise.     COLLEEN Rossi MD  CBD/rt             Electronically Signed by: Saranya Meza-, Other -Author on February 17, 2021 11:09:20 AM  Electronically Co-signed by: Canelo Rossi MD -Reviewer on February 17, 2021 11:19:35 AM

## 2021-05-14 NOTE — PROGRESS NOTES
Progress Note      Patient Name: Facundo Hinds   Patient ID: [id number]   Sex: Male   YOB: 1955    Primary Care Provider: Shailesh FIELD    Visit Date: April 28, 2021    Provider: RASHIDA Pugh   [place] [facility]   [place] Address: [address] [place] [address], [address]  [place], [place]  [id number]   Location Phone: [phone]          Chief Complaint  · weight loss      History Of Present Illness  Facundo Hinds is a 66 year old /Black male who presents for evaluation and treatment of:      Presents today for an acute visit with concerns of a 15 pound weight loss.  He also recently had gross hematuria last week.  Over the past 4 months he has lost 15 pounds of weight.  This last month he has had increased increase in loss of appetite decrease in appetite.  He recently had a death in his family.  His mother passed away on 4/7/2021.  Has been under a lot of stress.  Also wife is recent been diagnosed with congestive heart failure.  Last week he he had gross hematuria and passed blood clots.  The hematuria lasted for a couple days.  He is currently taking Eliquis 5 mg daily due to pulmonary embolism.  He has a slight amount of dysuria.  He also reports having a mild fever at that time.  He has been taking Tylenol as needed.  Over the past 4 days when monitoring his blood pressure he has been hypotensive.  He did not take his blood pressure yesterday nor today.  BP today is 102/60.  He has some lightheadedness when standing up quickly.    Reviewing the MRI of abdomen and pelvis without contrast from the  Department of  affairs.  Scan was done on 2/19/2021.  Impression - 2 similarly appearing left renal lesions, largest measuring nearly 3 cm in posterior mid to lower pole, both containing hemorrhagic components with enhancement.  This may represent a renal neoplasm with coexisting hemorrhage, however given there are 2 similar-appearing lesions could  be hemorrhagic complex cyst.  Recommended urology consultation, comparison with prior imaging including the primary right renal neoplasm if available.  CT may also be helpful for further characterize. Left adrenal gland and adenoma, and hepatic hemangiomas.    He has been having a cough for the past week.  Been experiencing nasal drainage.  He is taking antihistamine daily.  Cough is productive.  Denies shortness of breath.       Past Medical History  Cancer; Deafness; EKG abnormalities; Hemorrhoids; Hypertension; Kidney disorder; Night sweats; Shortness of Breath; Sinus trouble         Past Surgical History  Colonoscopy; Kidney; Testicle Surgery         Medication List  Eliquis 5 mg oral tablet; Flonase Allergy Relief 50 mcg/actuation nasal spray,suspension; lisinopril-hydrochlorothiazide 20-12.5 mg oral tablet         Allergy List  NO KNOWN DRUG ALLERGIES         Family Medical History  Cancer, Unspecified; - No Family History of Colorectal Cancer; Family history of certain chronic disabling diseases; arthritis; Family history of Arthritis         Social History  Alcohol Use (Never); lives with spouse; .; Recreational Drug Use (Never); Tobacco (Never); Working         Review of Systems  · Constitutional  o Admits  o : fatigue  o Denies  o : fever, chills, body aches, night sweats  · Eyes  o Denies  o : double vision, blurred vision, changes in vision  · HENT  o Admits  o : lightheadedness, postnasal drip  o Denies  o : headaches, vertigo, recent head injury, nasal congestion  · Cardiovascular  o Denies  o : chest pain, irregular heart beats, rapid heart rate, dyspnea on exertion, lower extremity edema  · Respiratory  o Admits  o : cough  o Denies  o : shortness of breath, wheezing  · Gastrointestinal  o Admits  o : loss of appetite  o Denies  o : nausea, vomiting, diarrhea, constipation, abdominal pain, blood in stools  · Genitourinary  o Admits  o : dysuria, hematuria, change in urine color, hot  "flashes  o Denies  o : urgency, frequency, incontinence, urinary retention  · Psychiatric  o Admits  o : depression, difficulty sleeping  o Denies  o : anxiety, hallucinations, delusions, feeling confused, compulsive behaviors, impulsive behaviors, suicidal ideation, homicidal ideation      Vitals  Date Time BP Position Site L\R Cuff Size HR RR TEMP (F) WT  HT  BMI kg/m2 BSA m2 O2 Sat FR L/min FiO2        04/28/2021 01:12 /60 Sitting    68 - R  97.9 240lbs 8oz 5'  10\" 34.51 2.32 96 %            Physical Examination  · Constitutional  o Appearance  o : obese, alert, in no acute distress  · Head and Face  o Head  o :   § Inspection  § : atraumatic, normocephalic  o Face  o :   § Inspection  § : no facial lesions  o HEENT  o : Unremarkable  · Eyes  o Conjunctivae  o : conjunctivae normal  o Sclerae  o : sclerae white  o Pupils and Irises  o : pupils equal and round, pupils reactive to light bilaterally  o Eyelids/Ocular Adnexae  o : eyelid appearance normal  · Ears, Nose, Mouth and Throat  o Ears  o :   § External Ears  § : appearance within normal limits, no lesions present  o Nose  o :   § External Nose  § : appearance normal  o Oral Cavity  o :   § Oral Mucosa  § : oral mucosa normal  § Lips  § : lip appearance normal  § Teeth  § : normal dentition for age  § Gums  § : gums pink, non-swollen, no bleeding present  § Tongue  § : tongue appearance normal  § Palate  § : hard palate normal, soft palate appearance normal  · Neck  o Inspection/Palpation  o : normal appearance, no masses or tenderness, trachea midline  o Thyroid  o : gland size normal, nontender, no nodules or masses present on palpation  · Respiratory  o Respiratory Effort  o : breathing unlabored  o Auscultation of Lungs  o : normal breath sounds  · Cardiovascular  o Heart  o :   § Auscultation of Heart  § : regular rate, normal rhythm, no murmurs present  o Peripheral Vascular System  o :   § Extremities  § : no " edema  · Gastrointestinal  o Abdominal Examination  o : abdomen nontender to palpation, normal bowel sounds, tone normal without rigidity or guarding, no masses present, abdominal obesity present, abdomen scaphoid upon supine  o Liver and spleen  o : no hepatomegaly present  · Lymphatic  o Neck  o : no lymphadenopathy   o Supraclavicular Nodes  o : no supraclavicular nodes              Assessment  · Cough     786.2/R05  Mucinex DM 1 tablet twice daily for the next 10 days  · Depression     311/F32.9  At this time he states he is coping well and declines counseling and medication.  · Essential hypertension     401.9/I10  Discontinue lisinopril hydrochlorothiazide 20-12.5 milligrams daily. Start lisinopril 10 mg daily due to hypotension  · Gross hematuria     599.71/R31.0  Urinalysis in office. Sent for culture and sensitivity. Consult urologist Berhane Crowe who he has previously seen prior for his renal carcinoma. Will forward MRI results to Dr. Crowe's office  · Renal lesion     593.9/N28.9  · Weight loss     783.21/R63.4      Plan  · Orders  o CBC with Auto Diff [facility] (24973) - 599.71/R31.0, 593.9/N28.9, 401.9/I10 - 04/28/2021  o CMP [facility] (49156) - 599.71/R31.0, 593.9/N28.9, 401.9/I10 - 04/28/2021  o Thyroid Profile (67576, THYII, 26734) - 401.9/I10 - 04/28/2021  o Urinalysis with Reflex Microscopy ([facility]) (63638) - 599.71/R31.0 - 04/28/2021  o Urine culture (83714, 15912) - 599.71/R31.0 - 04/28/2021  o ACO-15: Pneumococcal Vaccine Administered or Previously Received [facility] (4040F) - - 04/28/2021  o ACO-39: Current medications updated and reviewed (1159F, ) - - 04/28/2021  o UROLOGY CONSULTATION ([facility]) - 599.71/R31.0, 593.9/N28.9 - 04/28/2021   He has seen Dr. Berhane Crowe at [facility] in [place]. [phone]  · Medications  o lisinopril 10 mg oral tablet   SIG: take 1 tablet (10 mg) by oral route once daily for 30 days   DISP: (30) Tablet with 2 refills  Prescribed on 04/28/2021     o Mucinex DM 60-1,200 mg  oral tablet extended release 12 hr   SIG: take 1 tablet by oral route 2 times a day for 10 days   DISP: (20) Tablet with 0 refills  Prescribed on 04/28/2021     o lisinopril-hydrochlorothiazide 20-12.5 mg oral tablet   SIG: take 1 tablet by oral route once daily   DISP: (0) tablet with 0 refills  Discontinued on 04/28/2021     · Instructions  o Patient was educated/instructed on their diagnosis, treatment and medications prior to discharge from the clinic today.  · Disposition  o Call or Return if symptoms worsen or persist.  o f/u 1 month            Electronically Signed by: RASHIDA Pugh -Author on April 28, 2021 02:06:54 PM

## 2021-05-14 NOTE — PROGRESS NOTES
Progress Note      Patient Name: Facundo Hinds   Patient ID: [id number]   Sex: Male   YOB: 1955    Primary Care Provider: Shailesh FIELD    Visit Date: March 3, 2021    Provider: RASHIDA Pugh   [place] [facility]   Location Address: [address] [place] [address], [address]  [place], [place]  [id number]   Location Phone: [phone]          Chief Complaint  · 1 month follow-up      History Of Present Illness  Video Conferencing Visit  Facundo Hinds is a 65 year old /Black male who is presenting for evaluation via video conferencing via [employer]. Verbal consent obtained before beginning visit.   The following staff were present during this visit: Luís FIELD   Facundo Hinds is a 65 year old /Black male who presents for evaluation and treatment of:      1 month follow-up for hypertension and pulmonary embolism.  He was recently evaluated by Dr. Rossi cardiologist.  Cardiology noted that he had improvement in his right ventricular function.  He recommended lifelong anticoagulation.  Blood pressure at home is approximately 122/74.  Denies chest pain, syncope, palpitations, shortness of breath.    He has a history of kidney cancer with a right nephrectomy.  CT scan noticed that a 2.5 cm adrenal adenoma.  He recently had an MRI at the [facility] on 2/26/2021.  He read the report from the radiologist which noted 2 lesions on the left kidney largest 3 cm appearance hemorrhagic/bleeding recommended follow-up with nephrologist.  We will request the MRI report from the [facility].       Review of Systems  · Constitutional  o Denies  o : fatigue, night sweats  · Eyes  o Denies  o : double vision, blurred vision  · HENT  o Denies  o : vertigo, recent head injury  · Cardiovascular  o Denies  o : chest pain, irregular heart beats  · Respiratory  o Denies  o : shortness of breath, wheezing, hemoptysis, productive cough  · Gastrointestinal  o Denies  o :  nausea, vomiting, diarrhea, constipation  · Genitourinary  o Denies  o : dysuria, urinary retention  · Integument  o Denies  o : hair growth change, new skin lesions  · Neurologic  o Denies  o : altered mental status, seizures  · Musculoskeletal  o Denies  o : joint swelling, limitation of motion  · Endocrine  o Denies  o : cold intolerance, heat intolerance, central obesity  · Heme-Lymph  o Denies  o : petechiae, lymph node enlargement or tenderness      Physical Examination  · Constitutional  o Appearance  o : alert, in no acute distress  · Head and Face  o Head  o :   § Inspection  § : atraumatic, normocephalic  o Face  o :   § Inspection  § : no facial lesions  o HEENT  o : Unremarkable          Assessment  · Essential hypertension     401.9/I10  well controlled  · Pulmonary embolism     415.19/I26.99  improving. continue eliquis  · Renal lesion     593.9/N28.9  [facility]      Plan  · Orders  o ACO-39: Current medications updated and reviewed (1159F, ) - - 03/03/2021  · Instructions  o Patient was educated/instructed on their diagnosis, treatment and medications prior to discharge from the clinic today.  o Patient instructed to seek medical attention urgently for new or worsening symptoms.  o Call the office with any concerns or questions.  · Disposition  o Call or Return if symptoms worsen or persist.  o f/u 3 months            Electronically Signed by: RASHIDA Pugh -Author on March 3, 2021 03:34:04 PM

## 2021-05-15 VITALS — BODY MASS INDEX: 37.24 KG/M2 | HEIGHT: 70 IN | OXYGEN SATURATION: 97 % | WEIGHT: 260.12 LBS | HEART RATE: 74 BPM

## 2021-05-28 VITALS
RESPIRATION RATE: 14 BRPM | WEIGHT: 241 LBS | DIASTOLIC BLOOD PRESSURE: 74 MMHG | HEIGHT: 70 IN | BODY MASS INDEX: 34.5 KG/M2 | TEMPERATURE: 98.3 F | SYSTOLIC BLOOD PRESSURE: 121 MMHG | HEART RATE: 64 BPM | OXYGEN SATURATION: 97 %

## 2021-05-28 VITALS
HEIGHT: 70 IN | RESPIRATION RATE: 14 BRPM | WEIGHT: 245 LBS | HEART RATE: 65 BPM | SYSTOLIC BLOOD PRESSURE: 119 MMHG | DIASTOLIC BLOOD PRESSURE: 76 MMHG | OXYGEN SATURATION: 98 % | BODY MASS INDEX: 35.07 KG/M2

## 2021-05-28 NOTE — PROGRESS NOTES
Patient: SOLOMON MEEK     Acct: [id number]     Report: #[id number]  UNIT #: [id number]     : 1955    Encounter Date:2021  PRIMARY CARE: YONG CARLIN  ***Signed***  --------------------------------------------------------------------------------------------------------------------  Chief Complaint      Encounter Date      2021            Primary Care Provider      YONG CARLIN            Referring Provider            [facility] f/u            Patient Complaint      Patient is complaining of      [facility] F/U Acute Saddle Pulmonary Embolism            VITALS      Height 5 ft 10.00 in / 177.8 cm      Weight 245 lbs  / 111.321648 kg      BSA 2.28 m2      BMI 35.2 kg/m2      Pulse 65      Respirations 14      Blood Pressure 119/76 Sitting, Left Arm      Pulse Oximetry 98%, room air            HPI      The patient is a 65 year old male recently hospitalized at [facility] from  to 21 for acute saddle pulmonary embolus status post     TPA. The patient has a past medical history significant for hypertension and     previous blood clot about 7 years ago who presented to the ER on 21 due to    shortness of breath for 2 weeks that got acutely worse within the past 24 hours     prior to ER visit. The patient reports that 2 weeks ago he started to have a     productive cough and intermittent shortness of breath. The patient was seen by     his primary care provider who gave him steroids, antibiotics and albuterol but     this only slightly improved his symptoms. Over the weekend prior to his ER visit    he had no shortness of breath while at rest and an elevated heart rate. The     patient was able to rest and his heart rate came down slightly but maintained     the low 100s. The morning of the patient's ER visit, he felt acutely worse and     his shortness of air was so severe he could only take a few steps and was     gasping for breath. Due to this he sought medical  attention in the ER. A CT scan    of the chest with contrast was ordered and it showed extensive bilateral     pulmonary embolus including embolus in the main pulmonary artery with nearly     occlusive embolus in the right main pulmonary artery. On arrival he was     hypotensive with a blood pressure of 80-60 which responded to IV fluids. The     patient was also found to be hypoxic and required 6 liters of oxygen per minute     via nasal cannula. There was a concern for right heart strain. Pulmonary was     consulted and TPA was administered. The patient was admitted to ICU for further     care. Pulmonary and cardiology were consulted and there was a 2 D echocardiogram    performed showing a right ventricular pressure volume and severe right     ventricular enlargement. The patient had a repeat Echocardiogram the following     day that showed no sign of clot or filling defects and RV function was much     improved. The patient was transferred out of ICU and transitioned to oral     eliquis. The patient was weaned off of oxygen and of note, the patient did have     a 2.5 cm adrenal nodule. The patient states he did follow up with his primary     care provider after his hospital stay and is scheduled to have an MRI of the     abdomen on 02/19/21. The patient states he did have a history of kidney cancer     about 20 years and had his right kidney removed and still has the left kidney.     The patient states since his hospital stay he is doing much better. The patient     denies any increased shortness of air, cough or wheezing. The patient denies any    fever or chills, night sweats, hemoptysis,  purulent sputum production, swollen     glands in head and neck, unintentional weight loss, chest pain or chest     tightness, palpitations, syncope,  abdominal pain, nausea or vomiting or     diarrhea. The patient denies  any headaches, myalgias, sore throat, changes in     sense of taste and smell any coronavirus or flu  like symptoms.  The patient     denies any hematuria, hematochezia, hematemasis, hemoptysis or epistaxis.. The     patient states he is able to perform all his activities of daily living without     difficulty. The patient states he is a never smoker.            I reviewed the Review of Systems, medical, surgical and family history and agree    with those as entered.      Copies To:   Guilherme Rosen      Constitutional:  Denies: Fatigue, Fever, Weight gain, Weight loss, Chills,     Insomnia, Other      Respiratory/Breathing:  Denies: Shortness of air, Wheezing, Cough, Hemoptysis,     Pleuritic pain, Other      Endocrine:  Denies: Polydipsia, Polyuria, Heat/cold intolerance, Diabetes, Other      Eyes:  Denies: Blurred vision, Vision Changes, Other      Ears, nose, mouth, throat:  Denies: Mouth lesions, Thrush, Throat pain,     Hoarseness, Allergies/Hay Fever, Post Nasal Drip, Headaches, Recent Head Injury,    Nose Bleeding, Neck Stiffness, Thyroid Mass, Hearing Loss, Ear Fullness, Dry     Mouth, Nasal or Sinus Pain, Dry Lips, Nasal discharge, Nasal congestion, Other      Cardiovascular:  Denies: Palpitations, Syncope, Claudication, Chest Pain, Wake     up Gasping for air, Leg Swelling, Irregular Heart Rate, Cyanosis, Dyspnea on     Exertion, Other      Gastrointestinal:  Denies: Nausea, Constipation, Diarrhea, Abdominal pain,     Vomiting, Difficulty Swallowing, Reflux/Heartburn, Dysphagia, Jaundice,     Bloating, Melena, Bloody stools, Other      Genitourinary:  Denies: Urinary frequency, Incontinence, Hematuria, Urgency,     Nocturia, Dysuria, Testicular problems, Other      Musculoskeletal:  Denies: Joint Pain, Joint Stiffness, Joint Swelling, Myalgias,    Other      Hematologic/lymphatic:  DENIES: Lymphadenopathy, Bruising, Bleeding tendencies,     Other      Neurological:  Denies: Headache, Numbness, Weakness, Seizures, Other      Psychiatric:  Denies: Anxiety, Appropriate Effect, Depression,  Other      Sleep:  No: Excessive daytime sleep, Morning Headache?, Snoring, Insomnia?, Stop    breathing at sleep?, Other      Integumentary:  Denies: Rash, Dry skin, Skin Warm to Touch, Other      Immunologic/Allergic:  Denies: Latex allergy, Seasonal allergies, Asthma,     Urticaria, Eczema, Other      Immunization status:  No: Up to date            FAMILY/SOCIAL/MEDICAL HX      Surgical History:  Yes: Bladder Surgery (R NEPHRECTOMY), Bowel Surgery     (COLONOSCOPY), Orthopedic Surgery (Left shoulder repair); No: AAA Repair,     Abdominal Surgery, Adenoids, Angioplasty, Appendectomy, Back Surgery, Breast     Surgery, CABG, Carotid Stenosis, Cholecystectomy, Ear Surgery, Eye Surgery, Head    Surgery, Hernia Surgery, Kidney Surgery, Nose Surgery, Oral Surgery,     Prostatectomy, Rectal Surgery, Spinal Surgery, Testicular Surgery, Throat     Surgery, Tonsils, Valve Replacement, Vascular Surgery, Other Surgeries      Heart - Family Hx:  Uncle (x2)      Diabetes - Family Hx:  Cousin      Cancer/Type - Family Hx:  Mother (breast)      Is Father Still Living?:  No      Is Mother Still Living?:  Yes      Social History:  No Tobacco Use, No Alcohol Use, No Recreational Drug use      Smoking status:  Never smoker      Anticoagulation Therapy:  No      Antibiotic Prophylaxis:  No      Medical History:  Yes: Arthritis (GENERAL), Chemotherapy/Cancer (KIDNEY CA),     Hemorrhoids/Rectal Prob, High Blood Pressure (Takes meds); No: Alcoholism,     Allergies, Anemia, Asthma, Atrial Fibrillation, Blood Disease, Broken Bones,     Cataracts, Chemical Dependency, Chronic Bronchitis/COPD, Emphysema, Chronic     Liver Disease, Colon Trouble, Colitis, Diverticulitis, Congestive Heart Failu,     Deafness or Ringing Ears, Convulsions, Depression, Anxiety, Bipolar Disorder,     PTSD, Diabetes, Epilepsy, Seizures, Forgetfullness, Glaucoma, Gall Stones, Gout,    Head Injury, Heart Attack, Heart Murmur, GERD, Hepatitis, Hiatal Hernia, High      Cholesterol, HIV (Do not ask - volu, Jaundice, Kidney or Bladder Disease, Kidney    Stones, Migrane Headaches, Mitral Valve Prolapse, Night sweats, Phlebitis,     Psychiatric Care, Reflux Disease, Rheumatic Fever, Sexually Transmitted Dis,     Shortness Of Breath, Sinus Trouble, Skin Disease/Psoriais/Ecz, Stroke, Thyroid     Problem, Tuberculosis or Pos TB Te, Miscellaneous Medical/oth      Psychiatric History      none            PREVENTION      Hx Influenza Vaccination:  Yes      Influenza Vaccine Declined:  No      2 or More Falls in Past Year?:  No      Fall Past Year with Injury?:  No      Hx Pneumococcal Vaccination:  Yes      Encouraged to follow-up with:  PCP regarding preventative exams.      Chart initiated by      Susanne Nguyễn MA            ALLERGIES/MEDICATIONS      Allergies:        Coded Allergies:             ALBUTEROL (Verified  Allergy, Unknown, MAKES HIS SOA WORSE, 2/5/21)      Medications    Last Reconciled on 2/5/21 15:04 by CALLUM LEMUS       Apixaban (Eliquis) 5 Mg Tablet      5 MG PO BID for 90 Days, #180 TAB 2 Refills         Prov: CALLUM LEMUS PCCS         2/5/21       Mian-Fluticasone (Fluticasone 50 mcg) 16 Gm Spray.susp      2 PUFFS NARE EACH QDAY for 30 Days, #1 BOTTLE         Prov: JR YOO         1/22/21       Lisinopril-HCTZ 20-25 Mg (Lisinopril-HCTZ 20-25 Mg) 1 Each Tablet      1 TAB PO QDAY, #30 TAB 0 Refills         Reported         1/10/18      Current Medications      Current Medications Reviewed 2/5/21            EXAM      Vital Signs Reviewed      Gen: WDWN, Alert, NAD.        HEENT:  PERRL, EOMI.  OP, nares clear, no sinus tenderness.      Neck:  Supple, no JVD, no thyromegaly.      Lymph: No axillary, cervical, supraclavicular lymphadenopathy noted bilaterally.      Chest: Lungs clear to auscultation bilaterally, no wheezes, rales or rhonchi,     normal work of breathing noted, patient able to speak full sentences without di    fficulty.        CV:   RRR, no MGR, pulses 2+, equal.      Abd:  Soft, NT, ND, + BS, no HSM.      EXT:  No clubbing, no cyanosis, no edema, no joint tenderness.       Neuro:  A  Skin: No rashes or lesions.      Vtials      Vitals:             Height 5 ft 10.00 in / 177.8 cm           Weight 245 lbs  / 111.500995 kg           BSA 2.28 m2           BMI 35.2 kg/m2           Pulse 65           Respirations 14           Blood Pressure 119/76 Sitting, Left Arm           Pulse Oximetry 98%, room air            REVIEW      Results Reviewed      PCCS Results Reviewed?:  Yes Prev Lab Results, Yes Prev Radiology Results, Yes     Previous Mecial Records      Lab Results      I personally reviewed the patient 's discharge summary from recent     hospitalization.      Radiographic Results               [facility]                PACS RADIOLOGY REPORT            Patient: SOLOMON MEEK   Acct: #[id number]   Report: #[id number]            UNIT #: [id number]    DOS: 21 0822      INSURANCE:[employer]   ORDER #:[id number]      LOCATION:ER     : 1955            PROVIDERS      ADMITTING:     ATTENDING:       FAMILY:  NONE,MD   ORDERING:  Jaron Sharma         OTHER:    DICTATING:  Daniele Bledsoe MD            REQ #:[id number]   EXAM:CTPA - CT PULMONARY ANGIOGRAM      REASON FOR EXAM:  Shortness of Breath      REASON FOR VISIT:  CP/SOA--POOL            *******Signed******         PROCEDURE:   CT PULMONARY ANGIOGRAM             COMPARISON:   None.             INDICATIONS:   Shortness of Breath             TECHNIQUE:   After obtaining the patient's consent, CT images were obtained with    non-ionic       intravenous contrast material.               PROTOCOL:     Pulmonary embolism imaging protocol performed                RADIATION:     DLP: 903.5mGy*cm          Automated exposure control was utilized to minimize radiation dose.       CONTRAST:   100cc Isovue 370 I.V.              FINDINGS:         Chest CT demonstrates isodense nodular focus of thyroid parenchyma extending     inferiorly measuring       2.6 centimeters.  There is no enlarged mediastinal adenopathy.  No definite     hilar adenopathy.        Heart is normal in size.  There is extensive pulmonary embolism including     embolism in the main       pulmonary artery and nearly occlusive thrombus in the right main pulmonary     artery.  There is       extensive thrombus in the left main pulmonary artery.  Additional extensive     thrombus extends into       the right lower lobe right middle lobe and right upper lobe branches as well as     left lower lobe and       left upper lobe branches.  The pulmonary artery is larger than the aorta and the    right ventricle       appears to be enlarged concerning for right heart strain.  Mild reflux of     contrast in the IVC and       hepatic veins.  Aorta otherwise without focal abnormality.  The upper abdomen     demonstrates 2.5       centimeter left adrenal nodule.  A portion measures 2 Hounsfield units     suggesting it may be an       adenoma.  There are multiple left renal lesions incompletely visualized.  One of    these lesions is       indeterminate in posterior mid left kidney measuring 2.6 centimeters and     measuring about 30       Hounsfield units.  The right kidney is not visualized.  There no acute osseous     are seen.  There is       mild opacity in the left upper lobe.  There is bronchiectasis.  There likely is     mild emphysematous       change.  There is a few small nodular densities also seen in the left upper lobe    measuring up to       about 5 millimeters.  There is a few small nodular densities in the right upper     lobe measuring up       to 6 millimeters.  There is mild likely chronic opacity in right lower lobe.               CONCLUSION:         1. Extensive severe bilateral pulmonary embolism including embolus in the main     pulmonary artery,        nearly occlusive embolus in the right main pulmonary artery extending into lobar    right lower lobe,       right middle lobe and right lower lobe branches.  There is also extensive     thrombus in the left main       pulmonary artery, left lower lobar branch and left upper lobe lobar branch.      Discussed with       referring physician at about 1000 hours.      2. There may be right heart strain with enlargement the main pulmonary artery     and right ventricle       and mild reflux of contrast into the IVC.      3. Previous right nephrectomy.  There is an indeterminate 2.6 centimeter left ad    renal nodule.        Recommend follow-up CT renal mass protocol to further evaluate when patient is     more stable.      4. Left adrenal nodule measures 2.5 centimeters and could reflect adenoma.      5. There is mild emphysematous change in bronchiectasis.  Chronic changes are     seen.  There is are       scattered nodular opacities in the upper lobes measuring up to 0.6 centimeters.     Recommend       continued follow-up.      6. Isodense 2.5 centimeter nodule in the right thyroid.  Consider follow-up     thyroid ultrasound       patient is able.                      KALEE KRUEGER MD             Electronically Signed and Approved By: KALEE KRUEGER MD on 2021 at     10:24                               Until signed, this is an unconfirmed preliminary report that may contain      errors and is subject to change.                                              SCHJO:      D:21 1010                     [facility]                PACS RADIOLOGY REPORT            Patient: SOLOMON MEEK   Acct: #[id number]   Report: #[id number]            UNIT #: [id number]    DOS: 21 2133      INSURANCE:[employer]   ORDER #:[id number]      LOCATION:[address] [place] [address]   : 1955            PROVIDERS      ADMITTING:  JR YOO    ATTENDING: JR YOO      FAMILY:  NONE,MD   ORDERING:  CINDY ORONA         OTHER:    DICTATING:  Robi Boone MD            REQ #:[id number]   EXAM:CXR1 - CHEST 1 View AP PA      REASON FOR EXAM:  soa      REASON FOR VISIT:  PE S/P TPA            *******Signed******         PROCEDURE:   CHEST AP/PA SINGLE VIEW             COMPARISON:   [facility], CT, CT PULMONARY ANGIOGRAM, 1/19/2021,     9:37.  [facility], CR, CHEST AP/PA 1 VIEW, 1/19/2021, 7:54.             INDICATIONS:   SHORT OF AIR, COUGH AND CONGESTION             FINDINGS:         There is a left basilar opacity.  The heart and mediastinal contours appear     stable.  The pulmonary       vasculature appears normal.  The osseous structures appear intact.             CONCLUSION:   Left basilar opacity, which could reflect atelectasis, pneumonia,     or pulmonary       infarction.              ROBI BOONE MD             Electronically Signed and Approved By: ROBI BOONE MD on 1/20/2021 at 22:12                                  Until signed, this is an unconfirmed preliminary report that may contain      errors and is subject to change.                                              RODD1:      D:01/20/21 2212            Assessment      Pulmonary embolism - I26.99            Notes      New Medications      * Apixaban (Eliquis) 5 MG TABLET: 5 MG PO BID 90 Days #180      Discontinued Medications      * Apixaban (Eliquis) 5 MG TABLET: 10 MG PO BID 30 Days #72      New Diagnostics      * Echo Complete, 3 Months         Dx: Pulmonary embolism - I26.99      * Chest W/O Cont CT, 3 Months         Dx: Pulmonary embolism - I26.99      ASSESSMENT:      1. Acute saddle pulmonary embolus second episode of PE, the patient will be on     lifelong anticoagulation.       2. Right heart strain.       3. Non ST segment myocardial infarction secondary RV strain from large PE.       4.  Hypotension resolved.       5. Acute  hypoxic respiratory failure secondary to pulmonary embolism improved,     patient now on room air       6. Chronic kidney disease.       7. History of kidney cancer 20 years ago per patient report.       8. 2.5 cm adrenal adenoma, scheduled for MRI of the abdomen on 02/19/21 ordered     by primary care provider.       9. Never smoker.              PLAN:      1. Continue eliquis lifelong anticoagulation as this is the patient's second PE.          2. I will repeat an echocardiogram and CT scan of the chest with contrast in 3     months.       3. The patient is advised to call the office, call 911 or go to the ER for any     new or worsening symptoms.       4. Follow up with cardiology as scheduled.       5. Follow up with primary care provider and have MRI of the abdomen as sc    heduled.       6. The patient reports he is up to date with his pneumonia shot however he     refuses flu vaccine. Risks of refusing flu vaccine was discussed and the patient    verbalized understanding. The patient is advised to receive the COVID-19 vaccine    when available.  The patient is advised to follow CDC recommendations such as     social distancing, wearing a mask and washing hand for at least 20 seconds.      7. Follow up with Dr. Rosen in 3 months sooner if needed.            Patient Education      Patient Education Provided:  Acute Bronchitis      Time Spent:  > 50% /Coord Care            Electronically signed by CALLUM LEMUS [facility]  02/09/2021 12:01       Disclaimer: Converted document may not contain table formatting or lab diagrams. Please see [employer] for the authenticated document.

## 2021-05-28 NOTE — PROGRESS NOTES
Patient: SOLOMON MEEK     Acct: [id number]     Report: #[id number]  UNIT #: [id number]     : 1955    Encounter Date:2021  PRIMARY CARE: YONG CARLIN  ***Signed***  --------------------------------------------------------------------------------------------------------------------  Chief Complaint      Encounter Date      Mar 24, 2021            Primary Care Provider      YONG CARLIN            Referring Provider            [facility] f/u            Patient Complaint      Patient is complaining of      Patient here for CT results            VITALS      Height 5 ft 10 in / 177.8 cm      Weight 241 lbs  / 109.991578 kg      BSA 2.26 m2      BMI 34.6 kg/m2      Temperature 98.3 F / 36.83 C - Tympanic      Pulse 64      Respirations 14      Blood Pressure 121/74 Sitting, Right Arm      Pulse Oximetry 97%, room air            HPI      The patient is a 66 year old -American male with a history of DVT in the     past and had an acute saddle PE with massive PE physiology in 2021.  The     patient received TPA and was hospitalized.  Since then he has been on lifelong     anticoagulation with Eliquis. Repeat echocardiogram shows complete resolution of    right heart strain with some diastolic dysfunction. CAT scan shows complete     resolution of PEs recently. He is doing well. He remains on his Eliquis and     denies any unusual bleeding. He denies any leg swelling, orthopnea or paroxysmal    nocturnal dyspnea.  He denies any dyspnea, coughing, wheezing, headaches, chest     pain, weight loss or hemoptysis. He is able to perform ADLs without difficulty.     Denies any swollen glands or lymph nodes of the head and neck.            I have personally reviewed the review of systems, past family, social, surgical     and medical histories and I agree with the findings.            ROS      Constitutional:  Denies: Fatigue, Fever, Weight gain, Weight loss, Chills,     Insomnia, Other       Respiratory/Breathing:  Denies: Shortness of air, Wheezing, Cough, Hemoptysis,     Pleuritic pain, Other      Endocrine:  Denies: Polydipsia, Polyuria, Heat/cold intolerance, Diabetes, Other      Eyes:  Denies: Blurred vision, Vision Changes, Other      Ears, nose, mouth, throat:  Denies: Mouth lesions, Thrush, Throat pain,     Hoarseness, Allergies/Hay Fever, Post Nasal Drip, Headaches, Recent Head Injury,    Nose Bleeding, Neck Stiffness, Thyroid Mass, Hearing Loss, Ear Fullness, Dry     Mouth, Nasal or Sinus Pain, Dry Lips, Nasal discharge, Nasal congestion, Other      Cardiovascular:  Denies: Palpitations, Syncope, Claudication, Chest Pain, Wake     up Gasping for air, Leg Swelling, Irregular Heart Rate, Cyanosis, Dyspnea on     Exertion, Other      Gastrointestinal:  Denies: Nausea, Constipation, Diarrhea, Abdominal pain,     Vomiting, Difficulty Swallowing, Reflux/Heartburn, Dysphagia, Jaundice,     Bloating, Melena, Bloody stools, Other      Genitourinary:  Denies: Urinary frequency, Incontinence, Hematuria, Urgency,     Nocturia, Dysuria, Testicular problems, Other      Musculoskeletal:  Denies: Joint Pain, Joint Stiffness, Joint Swelling, Myalgias,    Other      Hematologic/lymphatic:  DENIES: Lymphadenopathy, Bruising, Bleeding tendencies,     Other      Neurological:  Denies: Headache, Numbness, Weakness, Seizures, Other      Psychiatric:  Denies: Anxiety, Appropriate Effect, Depression, Other      Sleep:  No: Excessive daytime sleep, Morning Headache?, Snoring, Insomnia?, Stop    breathing at sleep?, Other      Integumentary:  Denies: Rash, Dry skin, Skin Warm to Touch, Other      Immunologic/Allergic:  Denies: Latex allergy, Seasonal allergies, Asthma,     Urticaria, Eczema, Other      Immunization status:  No: Up to date            FAMILY/SOCIAL/MEDICAL HX      Surgical History:  Yes: Bladder Surgery (R NEPHRECTOMY), Bowel Surgery     (COLONOSCOPY), Orthopedic Surgery (Left shoulder repair); No: AAA  Repair,     Abdominal Surgery, Adenoids, Angioplasty, Appendectomy, Back Surgery, Breast     Surgery, CABG, Carotid Stenosis, Cholecystectomy, Ear Surgery, Eye Surgery, Head    Surgery, Hernia Surgery, Kidney Surgery, Nose Surgery, Oral Surgery,     Prostatectomy, Rectal Surgery, Spinal Surgery, Testicular Surgery, Throat     Surgery, Tonsils, Valve Replacement, Vascular Surgery, Other Surgeries      Heart - Family Hx:  Uncle (x2)      Diabetes - Family Hx:  Cousin      Cancer/Type - Family Hx:  Mother (breast)      Is Father Still Living?:  No      Is Mother Still Living?:  Yes      Social History:  No Tobacco Use, No Alcohol Use, No Recreational Drug use      Smoking status:  Never smoker      Anticoagulation Therapy:  Yes      Antibiotic Prophylaxis:  No      Medical History:  Yes: Arthritis (GENERAL), Chemotherapy/Cancer (KIDNEY CA),     Hemorrhoids/Rectal Prob, High Blood Pressure (Takes meds); No: Alcoholism,     Allergies, Anemia, Asthma, Atrial Fibrillation, Blood Disease, Broken Bones,     Cataracts, Chemical Dependency, Chronic Bronchitis/COPD, Emphysema, Chronic     Liver Disease, Colon Trouble, Colitis, Diverticulitis, Congestive Heart Failu,     Deafness or Ringing Ears, Convulsions, Depression, Anxiety, Bipolar Disorder,     PTSD, Diabetes, Epilepsy, Seizures, Forgetfullness, Glaucoma, Gall Stones, Gout,    Head Injury, Heart Attack, Heart Murmur, GERD, Hepatitis, Hiatal Hernia, High     Cholesterol, HIV (Do not ask - volu, Jaundice, Kidney or Bladder Disease, Kidney    Stones, Migrane Headaches, Mitral Valve Prolapse, Night sweats, Phlebitis,     Psychiatric Care, Reflux Disease, Rheumatic Fever, Sexually Transmitted Dis,     Shortness Of Breath, Sinus Trouble, Skin Disease/Psoriais/Ecz, Stroke, Thyroid     Problem, Tuberculosis or Pos TB Te, Miscellaneous Medical/oth      Psychiatric History      none            PREVENTION      Hx Influenza Vaccination:  No      Influenza Vaccine Declined:  Yes       2 or More Falls in Past Year?:  No      Fall Past Year with Injury?:  No      Hx Pneumococcal Vaccination:  Yes      Encouraged to follow-up with:  PCP regarding preventative exams.      Chart initiated by      Lois Tejeda CMA            ALLERGIES/MEDICATIONS      Allergies:        Coded Allergies:             ALBUTEROL (Verified  Allergy, Unknown, MAKES HIS SOA WORSE, 3/24/21)      Medications    Last Reconciled on 3/24/21 10:12 by RENETTA SAEED MD      Apixaban (Eliquis) 5 Mg Tablet      5 MG PO BID for 90 Days, #180 TAB 2 Refills         Prov: CALLUM LEMUS PCCS         2/5/21       Mian-Fluticasone (Fluticasone 50 mcg) 16 Gm Spray.susp      2 PUFFS NARE EACH QDAY for 30 Days, #1 BOTTLE         Prov: JR YOO         1/22/21       Lisinopril-HCTZ 20-25 Mg (Lisinopril-HCTZ 20-25 Mg) 1 Each Tablet      1 TAB PO QDAY, #30 TAB 0 Refills         Reported         1/10/18      Current Medications      Current Medications Reviewed 3/24/21            EXAM      Vital Signs Reviewed.      General:  WDWN, Alert, NAD.      HEENT: PERRL, EOMI.  OP, nares clear, no sinus tenderness.      Neck: Supple, no JVD, no thyromegaly.      Lymph: No axillary, cervical, supraclavicular lymphadenopathy noted bilaterally.      Chest: Good aeration, clear to auscultation bilaterally, tympanic to percussion     bilaterally, no work of breathing noted.      CV: RRR, no MGR, pulses 2+, equal.        Abd: Obese, soft, NT, ND, +BS, no HSM.      EXT: No clubbing, no cyanosis, no edema.        Neuro:  A  Skin: No rashes or lesions.      Vtials      Vitals:             Height 5 ft 10 in / 177.8 cm           Weight 241 lbs  / 109.533548 kg           BSA 2.26 m2           BMI 34.6 kg/m2           Temperature 98.3 F / 36.83 C - Tympanic           Pulse 64           Respirations 14           Blood Pressure 121/74 Sitting, Right Arm           Pulse Oximetry 97%, room air            REVIEW      Results Reviewed      PCCS Results Reviewed?:   Yes Prev Lab Results, Yes Prev Radiology Results, Yes     Previous [facility] Records      Lab Results      I reviewed Maile SungKinneyDarryl last office visit note.  I personally     reviewed the patient's hospital records.  I personally reviewed two chest CT     scans, the first one was in 2021 and second one was in 2021.  I personally    reviewed most recent echocardiogram in 2021 and compared it to the previous     one showing resolution of right heart strain.      Radiographic Results               [facility]                PACS RADIOLOGY REPORT            Patient: SOLOMON MEEK   Acct: #[id number]   Report: #[id number]            UNIT #: [id number]    DOS: 21      INSURANCE:[employer]   ORDER #:[id number]      LOCATION:ER     : 1955            PROVIDERS      ADMITTING:     ATTENDING:       FAMILY:  NONE,MD   ORDERING:  Jaron Sharma         OTHER:    DICTATING:  Daniele Bledsoe MD            REQ #:[id number]   EXAM:CTPA - CT PULMONARY ANGIOGRAM      REASON FOR EXAM:  Shortness of Breath      REASON FOR VISIT:  CP/SOA--POOL            *******Signed******         PROCEDURE:   CT PULMONARY ANGIOGRAM             COMPARISON:   None.             INDICATIONS:   Shortness of Breath             TECHNIQUE:   After obtaining the patient's consent, CT images were obtained with    non-ionic       intravenous contrast material.               PROTOCOL:     Pulmonary embolism imaging protocol performed                RADIATION:     DLP: 903.5mGy*cm          Automated exposure control was utilized to minimize radiation dose.       CONTRAST:   100cc Isovue 370 I.V.             FINDINGS:         Chest CT demonstrates isodense nodular focus of thyroid parenchyma extending     inferiorly measuring       2.6 centimeters.  There is no enlarged mediastinal adenopathy.  No definite hil    ar adenopathy.        Heart is normal in  size.  There is extensive pulmonary embolism including     embolism in the main       pulmonary artery and nearly occlusive thrombus in the right main pulmonary     artery.  There is       extensive thrombus in the left main pulmonary artery.  Additional extensive     thrombus extends into       the right lower lobe right middle lobe and right upper lobe branches as well as     left lower lobe and       left upper lobe branches.  The pulmonary artery is larger than the aorta and the    right ventricle       appears to be enlarged concerning for right heart strain.  Mild reflux of     contrast in the IVC and       hepatic veins.  Aorta otherwise without focal abnormality.  The upper abdomen     demonstrates 2.5       centimeter left adrenal nodule.  A portion measures 2 Hounsfield units     suggesting it may be an       adenoma.  There are multiple left renal lesions incompletely visualized.  One of    these lesions is       indeterminate in posterior mid left kidney measuring 2.6 centimeters and     measuring about 30       Hounsfield units.  The right kidney is not visualized.  There no acute osseous     are seen.  There is       mild opacity in the left upper lobe.  There is bronchiectasis.  There likely is     mild emphysematous       change.  There is a few small nodular densities also seen in the left upper lobe    measuring up to       about 5 millimeters.  There is a few small nodular densities in the right upper     lobe measuring up       to 6 millimeters.  There is mild likely chronic opacity in right lower lobe.               CONCLUSION:         1. Extensive severe bilateral pulmonary embolism including embolus in the main     pulmonary artery,       nearly occlusive embolus in the right main pulmonary artery extending into lobar    right lower lobe,       right middle lobe and right lower lobe branches.  There is also extensive     thrombus in the left main       pulmonary artery, left lower lobar branch  and left upper lobe lobar branch.      Discussed with       referring physician at about 1000 hours.      2. There may be right heart strain with enlargement the main pulmonary artery     and right ventricle       and mild reflux of contrast into the IVC.      3. Previous right nephrectomy.  There is an indeterminate 2.6 centimeter left     adrenal nodule.        Recommend follow-up CT renal mass protocol to further evaluate when patient is     more stable.      4. Left adrenal nodule measures 2.5 centimeters and could reflect adenoma.      5. There is mild emphysematous change in bronchiectasis.  Chronic changes are     seen.  There is are       scattered nodular opacities in the upper lobes measuring up to 0.6 centimeters.     Recommend       continued follow-up.      6. Isodense 2.5 centimeter nodule in the right thyroid.  Consider follow-up     thyroid ultrasound       patient is able.                      KALEE KRUEGER MD             Electronically Signed and Approved By: KALEE KRUEGER MD on 2021 at     10:24                               Until signed, this is an unconfirmed preliminary report that may contain      errors and is subject to change.                                              SCHJO:      D:21 1010               [facility]                PACS RADIOLOGY REPORT            Patient: SOLOMON MEEK   Acct: #[id number]   Report: #[id number]            UNIT #: [id number]    DOS: 21 1536      INSURANCE:[employer]   ORDER #:CT [id number]      LOCATION:CT     : 1955            PROVIDERS      ADMITTING:     ATTENDING: CALLUM LEMUS      FAMILY:  TESFAYE,YONG   ORDERING:  CALLUM LEMUS         OTHER:    DICTATING:  GARRETT LUCERO MD            REQ #:[id number]   EXAM:CHW - CT CHEST with CONTRAST      REASON FOR EXAM:  PULMONARY EMBOLISM      REASON FOR VISIT:  PULMONARY EMBOLISM             *******Signed with Addenda******                  ADDENDUM            This report includes an Addendum and supersedes previous reports for this exam.             PROCEDURE:   CT CHEST W/ CONTRAST             COMPARISON:   [facility], CT, CT PULMONARY ANGIOGRAM, 1/19/2021,     9:37.             INDICATIONS:   PULMONARY EMBOLISM, FOLLOWUP ON ELIQUIS             TECHNIQUE:   After obtaining the patient's consent, CT images were obtained with    non-ionic       intravenous contrast material.               PROTOCOL:     Pulmonary embolism imaging protocol performed                RADIATION:     DLP: 721mGy*cm          Automated exposure control was utilized to minimize radiation dose.       CONTRAST:   100cc Isovue 370 I.V.      LABS:     eGFR: 47ml/min/1.73m2             FINDINGS:         Previously seen extensive bilateral pulmonary emboli have resolved.  No gross     pulmonary arterial       filling defects are identified on the current study.  There is prominence of the    proximal pulmonary       artery segments which may indicate underlying pulmonary hypertension.  There is     patchy opacity in       the lung bases suggesting dependent atelectasis.  Lungs are otherwise clear.      Cardiomegaly is       present.  There are mildly prominent lymph nodes in the right hilum.  This may     be reactive or less       likely neoplastic.  Clinical correlation and appropriate follow-up recommended     as warranted.  The       thyroid is heterogeneous and mildly prominent with substernal extension on the     left suggesting       goitrous change.               In the upper abdomen, there is a 2.5 cm hypodense lesion in the left adrenal     again noted.  This may       be due to an adenoma.  There is degenerative change in the spine.               CONCLUSION:         1. Interval resolution of previously seen multifocal bilateral pulmonary emboli.             2. There is cardiomegaly with persistent enlargement  of the right heart and     proximal pulmonary       arteries suggesting pulmonary hypertension.             3. Additional findings as given above.                GARRETT LUCERO MD             Electronically Signed and Approved By: GARRETT LUCERO MD on 3/17/2021 at 15:54                ADDENDUM:              There are multiple subcentimeter ground-glass nodular opacities again noted     predominantly within       the subpleural upper lungs bilaterally.  There are more patchy confluent areas     of ground-glass       opacity in the bases.  This may be due to infectious or inflammatory process.      Correlate       clinically.  Imaging follow-up could be performed as previously recommended to     rule out neoplasm.              GARRETT LUCERO MD             Electronically Signed and Approved By: GARRETT LUCERO MD on 3/17/2021 at 16:02                                     03/17/21 1606            GARRETT LUCERO MD            WHIK1:      D:03/17/21 1602      T:              PROCEDURE:   CT CHEST W/ CONTRAST             COMPARISON:   [facility], CT, CT PULMONARY ANGIOGRAM, 1/19/2021,     9:37.             INDICATIONS:   PULMONARY EMBOLISM, FOLLOWUP ON ELIQUIS             TECHNIQUE:   After obtaining the patient's consent, CT images were obtained with    non-ionic       intravenous contrast material.               PROTOCOL:     Pulmonary embolism imaging protocol performed                RADIATION:     DLP: 721mGy*cm          Automated exposure control was utilized to minimize radiation dose.       CONTRAST:   100cc Isovue 370 I.V.      LABS:     eGFR: 47ml/min/1.73m2             FINDINGS:         Previously seen extensive bilateral pulmonary emboli have resolved.  No gross     pulmonary arterial       filling defects are identified on the current study.  There is prominence of the    proximal pulmonary       artery segments which may indicate underlying pulmonary hypertension.  There is     patchy opacity in       the  lung bases suggesting dependent atelectasis.  Lungs are otherwise clear.      Cardiomegaly is       present.  There are mildly prominent lymph nodes in the right hilum.  This may     be reactive or less       likely neoplastic.  Clinical correlation and appropriate follow-up recommended     as warranted.  The       thyroid is heterogeneous and mildly prominent with substernal extension on the     left suggesting       goitrous change.               In the upper abdomen, there is a 2.5 cm hypodense lesion in the left adrenal     again noted.  This may       be due to an adenoma.  There is degenerative change in the spine.               CONCLUSION:         1. Interval resolution of previously seen multifocal bilateral pulmonary emboli.             2. There is cardiomegaly with persistent enlargement of the right heart and     proximal pulmonary       arteries suggesting pulmonary hypertension.             3. Additional findings as given above.                GARRETT LUCERO MD             Electronically Signed and Approved By: GARRETT LUCERO MD on 3/17/2021 at 15:54                               Until signed, this is an unconfirmed preliminary report that may contain      errors and is subject to change.                                              WHIK1:      D:03/17/21 1554            Assessment      IMPRESSION:      1. Acute massive PE, resolved.      2.  Pulmonary hypertension secondary to pulmonary embolism, resolved.      3. Chronic compensated diastolic congestive heart failure.      4. On lifelong anticoagulation with Eliquis.      5. Dyspnea, resolved.      6. Known history of DVT.      7. Obesity with BMI of 34.6.               PLAN:      1.  The patient has had complete resolution of right heart strain and pulmonary     emboli.      2. Given massive PE requiring TPA and a history of previous DVT, I recommend     lifelong anticoagulation with Eliquis. Continue Eliquis at current dose.  The     patient verbalized  understanding regarding this.      3. The patient is up-to-date with Prevnar and Pneumovax. He refuses flu     vaccination.      4. Four minutes of diet and exercise counseling provided today.  Recommend 30     minutes of daily exercise as well as a 1800 calorie a day low fat diet.  The     patient verbalized understanding and will make attempts to lose weight.        5. The patient can follow up with us as needed.            Patient Education      ACO BMI High above 25:  Counseling Given, Encouraged weight loss, Encourage     dietary changes            Electronically signed by RENETTA SAEED  03/25/2021 16:51       Disclaimer: Converted document may not contain table formatting or lab diagrams. Please see [employer] for the authenticated document.

## 2021-06-05 NOTE — PROGRESS NOTES
Progress Note      Patient Name: Facundo Hinds   Patient ID: [id number]   Sex: Male   YOB: 1955    Primary Care Provider: Shailesh FIELD   Referring Provider: Shailesh FIELD    Visit Date: May 12, 2021    Provider: RASHIDA Pugh   [place] [facility] [place]   [place] Address: [address] [place] [address], [address] [place] [address]  [id number]   Location Phone: [phone]          Chief Complaint  · 2 week follow up on blood in urine      History Of Present Illness  Facundo Hinds is a 66 year old /Black male who presents for evaluation and treatment of:      Presents today for a 2-week follow-up on gross hematuria.  For the past 2 weeks he denies any further blood in his urine.  His CBC noted he was slightly anemic.  Urinalysis was within normal limits.  Urine culture was normal.  [facility]  Dr. Vela ordered another MRI which is scheduled for the 14th for renal lesions.  He has a follow-up with Dr. Gonzalez on the 19th.    He began taking Flonase which has helped his nasal congestion and cough.       Past Medical History  Cancer; Deafness; EKG abnormalities; Hemorrhoids; Hypertension; Kidney disorder; Night sweats; Shortness of Breath; Sinus trouble         Past Surgical History  Colonoscopy; Kidney; Testicle Surgery         Medication List  Eliquis 5 mg oral tablet; Flonase Allergy Relief 50 mcg/actuation nasal spray,suspension; lisinopril 10 mg oral tablet; Mucinex DM 60-1,200 mg oral tablet extended release 12 hr         Allergy List  NO KNOWN DRUG ALLERGIES         Family Medical History  Cancer, Unspecified; - No Family History of Colorectal Cancer; Family history of certain chronic disabling diseases; arthritis; Family history of Arthritis         Social History  Alcohol Use (Never); lives with spouse; .; Recreational Drug Use (Never); Tobacco (Never); Working         Review of Systems  · Constitutional  o Denies  o :  "fatigue, fever, chills, night sweats  · Eyes  o Denies  o : double vision, blurred vision  · HENT  o Denies  o : headaches, vertigo, lightheadedness, recent head injury  · Cardiovascular  o Denies  o : chest pain, irregular heart beats  · Respiratory  o Denies  o : shortness of breath, productive cough  · Gastrointestinal  o Denies  o : nausea, vomiting, diarrhea, constipation  · Genitourinary  o Denies  o : urgency, frequency, dysuria, hematuria, urinary retention  · Endocrine  o Admits  o : central obesity  o Denies  o : cold intolerance, heat intolerance      Vitals  Date Time BP Position Site L\R Cuff Size HR RR TEMP (F) WT  HT  BMI kg/m2 BSA m2 O2 Sat FR L/min FiO2 HC       05/12/2021 10:42 /60 Sitting    69 - R  98 245lbs 0oz 5'  10\" 35.15 2.34 95 %            Physical Examination  · Constitutional  o Appearance  o : obese, alert, in no acute distress  · Head and Face  o Head  o :   § Inspection  § : atraumatic, normocephalic  o Face  o :   § Inspection  § : no facial lesions  · Eyes  o Conjunctivae  o : conjunctivae normal  o Sclerae  o : sclerae white  o Pupils and Irises  o : pupils equal and round, pupils reactive to light bilaterally  o Eyelids/Ocular Adnexae  o : eyelid appearance normal  · Neck  o Inspection/Palpation  o : normal appearance, no masses or tenderness, trachea midline  o Thyroid  o : gland size normal, nontender, no nodules or masses present on palpation  · Respiratory  o Respiratory Effort  o : breathing unlabored  o Auscultation of Lungs  o : normal breath sounds  · Cardiovascular  o Heart  o :   § Auscultation of Heart  § : regular rate, normal rhythm, no murmurs present  o Peripheral Vascular System  o :   § Extremities  § : no edema  · Gastrointestinal  o Abdominal Examination  o : abdomen nontender to palpation, normal bowel sounds, tone normal without rigidity or guarding, no masses present  o Liver and spleen  o : no hepatomegaly present  · Lymphatic  o Neck  o : no " lymphadenopathy   o Supraclavicular Nodes  o : no supraclavicular nodes          Assessment  · Allergic rhinitis due to allergen     477.9/J30.9  Continue using Flonase. May take an antihistamine.  · Creatinine elevation     790.99/R79.89  He does not take any NSAIDs. Encouraged to drink more water. We will recheck at next office visit  · Renal lesion     593.9/N28.9  He has an MRI scheduled on May 14. [facility] will [facility] with [facility]      Plan  · Orders  o ACO-39: Current medications updated and reviewed (, 1159F) - - 05/12/2021  · Medications  o Medications have been Reconciled  o Transition of Care or Provider Policy  · Instructions  o Patient was educated/instructed on their diagnosis, treatment and medications prior to discharge from the clinic today.  o Patient instructed to seek medical attention urgently for new or worsening symptoms.  o Call the office with any concerns or questions.  · Disposition  o Call or Return if symptoms worsen or persist.  o f/u 3 months            Electronically Signed by: RASHIDA Pugh -Author on May 12, 2021 11:16:12 AM

## 2021-06-23 ENCOUNTER — TELEPHONE (OUTPATIENT)
Dept: FAMILY MEDICINE CLINIC | Facility: CLINIC | Age: 66
End: 2021-06-23

## 2021-06-23 RX ORDER — FLUTICASONE PROPIONATE 50 MCG
2 SPRAY, SUSPENSION (ML) NASAL DAILY
COMMUNITY
End: 2021-06-25 | Stop reason: SDUPTHER

## 2021-06-25 RX ORDER — FLUTICASONE PROPIONATE 50 MCG
2 SPRAY, SUSPENSION (ML) NASAL DAILY
Qty: 16 G | Refills: 5 | Status: SHIPPED | OUTPATIENT
Start: 2021-06-25 | End: 2022-02-11 | Stop reason: SDUPTHER

## 2021-07-15 VITALS
BODY MASS INDEX: 35.07 KG/M2 | TEMPERATURE: 98 F | SYSTOLIC BLOOD PRESSURE: 112 MMHG | OXYGEN SATURATION: 95 % | HEART RATE: 69 BPM | DIASTOLIC BLOOD PRESSURE: 60 MMHG | HEIGHT: 70 IN | WEIGHT: 245 LBS

## 2021-08-11 PROBLEM — K64.9 HEMORRHOIDS: Status: ACTIVE | Noted: 2021-08-11

## 2021-08-11 PROBLEM — J34.9 SINUS TROUBLE: Status: ACTIVE | Noted: 2021-08-11

## 2021-08-11 PROBLEM — R06.02 SHORTNESS OF BREATH: Status: ACTIVE | Noted: 2021-08-11

## 2021-08-11 PROBLEM — R94.31 EKG ABNORMALITIES: Status: ACTIVE | Noted: 2017-01-18

## 2021-08-11 PROBLEM — C80.1 CANCER: Status: ACTIVE | Noted: 2021-08-11

## 2021-08-11 PROBLEM — I10 HYPERTENSION: Status: ACTIVE | Noted: 2021-08-11

## 2021-08-11 PROBLEM — H91.90 DEAFNESS: Status: ACTIVE | Noted: 2021-08-11

## 2021-08-11 PROBLEM — N28.9 KIDNEY DISORDER: Status: ACTIVE | Noted: 2021-08-11

## 2021-08-11 PROBLEM — R61 NIGHT SWEATS: Status: ACTIVE | Noted: 2021-08-11

## 2021-08-11 RX ORDER — LISINOPRIL AND HYDROCHLOROTHIAZIDE 25; 20 MG/1; MG/1
TABLET ORAL
COMMUNITY
Start: 2021-05-28 | End: 2021-08-11

## 2021-08-11 RX ORDER — APIXABAN 5 MG/1
TABLET, FILM COATED ORAL
COMMUNITY
Start: 2021-06-11 | End: 2021-08-12 | Stop reason: SDUPTHER

## 2021-08-11 RX ORDER — LISINOPRIL AND HYDROCHLOROTHIAZIDE 25; 20 MG/1; MG/1
TABLET ORAL
COMMUNITY
Start: 2021-05-27 | End: 2021-08-12 | Stop reason: SDUPTHER

## 2021-08-12 ENCOUNTER — OFFICE VISIT (OUTPATIENT)
Dept: FAMILY MEDICINE CLINIC | Facility: CLINIC | Age: 66
End: 2021-08-12

## 2021-08-12 VITALS
BODY MASS INDEX: 35.59 KG/M2 | WEIGHT: 248.6 LBS | OXYGEN SATURATION: 97 % | HEIGHT: 70 IN | SYSTOLIC BLOOD PRESSURE: 126 MMHG | DIASTOLIC BLOOD PRESSURE: 72 MMHG | HEART RATE: 59 BPM | TEMPERATURE: 97.5 F

## 2021-08-12 DIAGNOSIS — E66.01 CLASS 2 SEVERE OBESITY DUE TO EXCESS CALORIES WITH SERIOUS COMORBIDITY AND BODY MASS INDEX (BMI) OF 35.0 TO 35.9 IN ADULT (HCC): ICD-10-CM

## 2021-08-12 DIAGNOSIS — Z79.01 LONG TERM (CURRENT) USE OF ANTICOAGULANTS: ICD-10-CM

## 2021-08-12 DIAGNOSIS — I10 ESSENTIAL HYPERTENSION: Primary | ICD-10-CM

## 2021-08-12 PROBLEM — E66.09 OBESITY DUE TO EXCESS CALORIES: Status: ACTIVE | Noted: 2021-08-12

## 2021-08-12 PROCEDURE — 99213 OFFICE O/P EST LOW 20 MIN: CPT | Performed by: NURSE PRACTITIONER

## 2021-08-12 RX ORDER — LISINOPRIL AND HYDROCHLOROTHIAZIDE 25; 20 MG/1; MG/1
1.5 TABLET ORAL DAILY
Qty: 135 TABLET | Refills: 1 | Status: SHIPPED | OUTPATIENT
Start: 2021-08-12 | End: 2021-11-11 | Stop reason: SDUPTHER

## 2021-08-12 NOTE — ASSESSMENT & PLAN NOTE
Patient's (Body mass index is 35.67 kg/m².) indicates that they are morbidly obese (BMI > 40 or > 35 with obesity - related health condition) with health conditions that include hypertension . Weight is unchanged. BMI is is above average; BMI management plan is completed. We discussed portion control and increasing exercise.

## 2021-08-12 NOTE — PROGRESS NOTES
"Chief Complaint  Follow-up hypertension    Subjective          Facundo Hinds presents to [facility]  History of Present Illness  He has had no further gross hematuria.  He is recently seen Dr. Vela for follow-up on renal lesions.  He is having a repeat MRI in October.  He has a history of renal lesions are verifing to see if there is any changes.    Blood pressure is well controlled.  Denies chest pain, syncope, palpitations.  Wife was recently diagnosed diabetic.  Has been doing lifestyle changes with his wife and eating more healthier.      Objective   Vital Signs:   /72   Pulse 59   Temp 97.5 °F (36.4 °C)   Ht 177.8 cm (70\")   Wt 113 kg (248 lb 9.6 oz)   SpO2 97%   BMI 35.67 kg/m²     Physical Exam  Vitals reviewed.   Constitutional:       Appearance: Normal appearance. He is well-developed. He is obese.   HENT:      Head: Normocephalic and atraumatic.      Right Ear: External ear normal.      Left Ear: External ear normal.      Mouth/Throat:      Pharynx: No oropharyngeal exudate.   Eyes:      Conjunctiva/sclera: Conjunctivae normal.      Pupils: Pupils are equal, round, and reactive to light.   Cardiovascular:      Rate and Rhythm: Normal rate and regular rhythm.      Heart sounds: No murmur heard.   No friction rub. No gallop.    Pulmonary:      Effort: Pulmonary effort is normal.      Breath sounds: Normal breath sounds. No wheezing or rhonchi.   Abdominal:      General: Bowel sounds are normal. There is no distension.      Palpations: Abdomen is soft.      Tenderness: There is no abdominal tenderness.   Skin:     General: Skin is warm and dry.   Neurological:      Mental Status: He is alert and oriented to person, place, and time.      Cranial Nerves: No cranial nerve deficit.   Psychiatric:         Mood and Affect: Mood and affect normal.         Behavior: Behavior normal.         Thought Content: Thought content normal.         Judgment: Judgment normal. "        Result Review :                 Assessment and Plan    Diagnoses and all orders for this visit:    1. Essential hypertension (Primary)  -     lisinopril-hydrochlorothiazide (PRINZIDE,ZESTORETIC) 20-25 MG per tablet; Take 1.5 tablets by mouth Daily.  Dispense: 135 tablet; Refill: 1    2. Class 2 severe obesity due to excess calories with serious comorbidity and body mass index (BMI) of 35.0 to 35.9 in adult (CMS/[facility])  Assessment & Plan:  Patient's (Body mass index is 35.67 kg/m².) indicates that they are morbidly obese (BMI > 40 or > 35 with obesity - related health condition) with health conditions that include hypertension . Weight is unchanged. BMI is is above average; BMI management plan is completed. We discussed portion control and increasing exercise.       3. Long term (current) use of anticoagulants  -     apixaban (Eliquis) 5 MG tablet tablet; Take 1 tablet by mouth Every 12 (Twelve) Hours.  Dispense: 60 tablet; Refill: 2      Follow Up   Return in about 3 months (around 11/12/2021) for Next scheduled follow up.  Patient was given instructions and counseling regarding his condition or for health maintenance advice. Please see specific information pulled into the AVS if appropriate.

## 2021-11-11 ENCOUNTER — OFFICE VISIT (OUTPATIENT)
Dept: FAMILY MEDICINE CLINIC | Facility: CLINIC | Age: 66
End: 2021-11-11

## 2021-11-11 VITALS
HEART RATE: 71 BPM | TEMPERATURE: 97.4 F | BODY MASS INDEX: 35.82 KG/M2 | HEIGHT: 70 IN | SYSTOLIC BLOOD PRESSURE: 138 MMHG | DIASTOLIC BLOOD PRESSURE: 80 MMHG | OXYGEN SATURATION: 95 % | WEIGHT: 250.2 LBS

## 2021-11-11 DIAGNOSIS — Z12.5 SCREENING FOR PROSTATE CANCER: ICD-10-CM

## 2021-11-11 DIAGNOSIS — Z79.01 LONG TERM (CURRENT) USE OF ANTICOAGULANTS: ICD-10-CM

## 2021-11-11 DIAGNOSIS — I10 PRIMARY HYPERTENSION: Primary | ICD-10-CM

## 2021-11-11 DIAGNOSIS — N28.89 KIDNEY MASS: ICD-10-CM

## 2021-11-11 DIAGNOSIS — Z11.59 ENCOUNTER FOR HEPATITIS C SCREENING TEST FOR LOW RISK PATIENT: ICD-10-CM

## 2021-11-11 DIAGNOSIS — Z23 NEED FOR INFLUENZA VACCINATION: ICD-10-CM

## 2021-11-11 DIAGNOSIS — N18.31 STAGE 3A CHRONIC KIDNEY DISEASE (HCC): ICD-10-CM

## 2021-11-11 PROBLEM — Z86.711 HISTORY OF PULMONARY EMBOLISM: Status: ACTIVE | Noted: 2021-11-11

## 2021-11-11 PROBLEM — Z86.718 HISTORY OF DVT (DEEP VEIN THROMBOSIS): Status: ACTIVE | Noted: 2021-11-11

## 2021-11-11 LAB
ALBUMIN SERPL-MCNC: 4.5 G/DL (ref 3.5–5.2)
ALBUMIN/GLOB SERPL: 1.2 G/DL
ALP SERPL-CCNC: 82 U/L (ref 39–117)
ALT SERPL W P-5'-P-CCNC: 23 U/L (ref 1–41)
ANION GAP SERPL CALCULATED.3IONS-SCNC: 7.7 MMOL/L (ref 5–15)
AST SERPL-CCNC: 19 U/L (ref 1–40)
BASOPHILS # BLD AUTO: 0.04 10*3/MM3 (ref 0–0.2)
BASOPHILS NFR BLD AUTO: 0.7 % (ref 0–1.5)
BILIRUB SERPL-MCNC: 0.3 MG/DL (ref 0–1.2)
BILIRUB UR QL STRIP: NEGATIVE
BUN SERPL-MCNC: 16 MG/DL (ref 8–23)
BUN/CREAT SERPL: 12.5 (ref 7–25)
CALCIUM SPEC-SCNC: 9.8 MG/DL (ref 8.6–10.5)
CHLORIDE SERPL-SCNC: 98 MMOL/L (ref 98–107)
CHOLEST SERPL-MCNC: 233 MG/DL (ref 0–200)
CLARITY UR: CLEAR
CO2 SERPL-SCNC: 31.3 MMOL/L (ref 22–29)
COLOR UR: YELLOW
CREAT SERPL-MCNC: 1.28 MG/DL (ref 0.76–1.27)
DEPRECATED RDW RBC AUTO: 42.8 FL (ref 37–54)
EOSINOPHIL # BLD AUTO: 0.15 10*3/MM3 (ref 0–0.4)
EOSINOPHIL NFR BLD AUTO: 2.5 % (ref 0.3–6.2)
ERYTHROCYTE [DISTWIDTH] IN BLOOD BY AUTOMATED COUNT: 12.2 % (ref 12.3–15.4)
GFR SERPL CREATININE-BSD FRML MDRD: 69 ML/MIN/1.73
GLOBULIN UR ELPH-MCNC: 3.7 GM/DL
GLUCOSE SERPL-MCNC: 93 MG/DL (ref 65–99)
GLUCOSE UR STRIP-MCNC: NEGATIVE MG/DL
HCT VFR BLD AUTO: 42.9 % (ref 37.5–51)
HCV AB SER DONR QL: NORMAL
HDLC SERPL-MCNC: 77 MG/DL (ref 40–60)
HGB BLD-MCNC: 14.4 G/DL (ref 13–17.7)
HGB UR QL STRIP.AUTO: NEGATIVE
IMM GRANULOCYTES # BLD AUTO: 0.03 10*3/MM3 (ref 0–0.05)
IMM GRANULOCYTES NFR BLD AUTO: 0.5 % (ref 0–0.5)
KETONES UR QL STRIP: NEGATIVE
LDLC SERPL CALC-MCNC: 139 MG/DL (ref 0–100)
LDLC/HDLC SERPL: 1.76 {RATIO}
LEUKOCYTE ESTERASE UR QL STRIP.AUTO: NEGATIVE
LYMPHOCYTES # BLD AUTO: 1.35 10*3/MM3 (ref 0.7–3.1)
LYMPHOCYTES NFR BLD AUTO: 22.6 % (ref 19.6–45.3)
MCH RBC QN AUTO: 32.1 PG (ref 26.6–33)
MCHC RBC AUTO-ENTMCNC: 33.6 G/DL (ref 31.5–35.7)
MCV RBC AUTO: 95.8 FL (ref 79–97)
MONOCYTES # BLD AUTO: 0.59 10*3/MM3 (ref 0.1–0.9)
MONOCYTES NFR BLD AUTO: 9.9 % (ref 5–12)
NEUTROPHILS NFR BLD AUTO: 3.82 10*3/MM3 (ref 1.7–7)
NEUTROPHILS NFR BLD AUTO: 63.8 % (ref 42.7–76)
NITRITE UR QL STRIP: NEGATIVE
NRBC BLD AUTO-RTO: 0 /100 WBC (ref 0–0.2)
PH UR STRIP.AUTO: 7 [PH] (ref 5–8)
PLATELET # BLD AUTO: 217 10*3/MM3 (ref 140–450)
PMV BLD AUTO: 10.6 FL (ref 6–12)
POTASSIUM SERPL-SCNC: 4 MMOL/L (ref 3.5–5.2)
PROT SERPL-MCNC: 8.2 G/DL (ref 6–8.5)
PROT UR QL STRIP: NEGATIVE
PSA SERPL-MCNC: 0.78 NG/ML (ref 0–4)
RBC # BLD AUTO: 4.48 10*6/MM3 (ref 4.14–5.8)
SODIUM SERPL-SCNC: 137 MMOL/L (ref 136–145)
SP GR UR STRIP: 1.02 (ref 1–1.03)
TRIGL SERPL-MCNC: 101 MG/DL (ref 0–150)
TSH SERPL DL<=0.05 MIU/L-ACNC: 1.25 UIU/ML (ref 0.27–4.2)
UROBILINOGEN UR QL STRIP: NORMAL
VLDLC SERPL-MCNC: 17 MG/DL (ref 5–40)
WBC # BLD AUTO: 5.98 10*3/MM3 (ref 3.4–10.8)

## 2021-11-11 PROCEDURE — 86803 HEPATITIS C AB TEST: CPT | Performed by: NURSE PRACTITIONER

## 2021-11-11 PROCEDURE — 84443 ASSAY THYROID STIM HORMONE: CPT | Performed by: NURSE PRACTITIONER

## 2021-11-11 PROCEDURE — G0103 PSA SCREENING: HCPCS | Performed by: NURSE PRACTITIONER

## 2021-11-11 PROCEDURE — 80053 COMPREHEN METABOLIC PANEL: CPT | Performed by: NURSE PRACTITIONER

## 2021-11-11 PROCEDURE — 81003 URINALYSIS AUTO W/O SCOPE: CPT | Performed by: NURSE PRACTITIONER

## 2021-11-11 PROCEDURE — 85025 COMPLETE CBC W/AUTO DIFF WBC: CPT | Performed by: NURSE PRACTITIONER

## 2021-11-11 PROCEDURE — 99214 OFFICE O/P EST MOD 30 MIN: CPT | Performed by: NURSE PRACTITIONER

## 2021-11-11 PROCEDURE — 80061 LIPID PANEL: CPT | Performed by: NURSE PRACTITIONER

## 2021-11-11 RX ORDER — LISINOPRIL AND HYDROCHLOROTHIAZIDE 25; 20 MG/1; MG/1
1.5 TABLET ORAL DAILY
Qty: 135 TABLET | Refills: 2 | Status: SHIPPED | OUTPATIENT
Start: 2021-11-11 | End: 2022-10-29 | Stop reason: SDUPTHER

## 2021-11-11 RX ORDER — LISINOPRIL AND HYDROCHLOROTHIAZIDE 25; 20 MG/1; MG/1
1.5 TABLET ORAL DAILY
Qty: 135 TABLET | Refills: 2 | Status: SHIPPED | OUTPATIENT
Start: 2021-11-11 | End: 2021-11-11

## 2021-11-11 NOTE — PROGRESS NOTES
"Chief Complaint  Hypertension    Subjective          Facundo Hinds presents to [facility]  History of Present Illness  Presents today for 3-month follow-up on hypertension. Blood pressure is fairly controlled. Denies chest pain, CP, palpitations. He denies shortness of breath, cough, wheezing. He is currently taking Eliquis twice daily for history of DVT and pulmonary embolism saddle.    He has history of chronic kidney disease stage III. He was also seen Dr. Crowe urology specialist for left kidney mass. History of right kidney cancer with right kidney removal.    [facility] in [facility]. He is already returned the sample.    Objective   Vital Signs:   /80   Pulse 71   Temp 97.4 °F (36.3 °C)   Ht 177.8 cm (70\")   Wt 113 kg (250 lb 3.2 oz)   SpO2 95%   BMI 35.90 kg/m²     Physical Exam  Vitals reviewed.   Constitutional:       Appearance: Normal appearance. He is well-developed. He is obese.   HENT:      Head: Normocephalic and atraumatic.      Right Ear: External ear normal.      Left Ear: External ear normal.      Mouth/Throat:      Pharynx: No oropharyngeal exudate.   Eyes:      Conjunctiva/sclera: Conjunctivae normal.      Pupils: Pupils are equal, round, and reactive to light.   Cardiovascular:      Rate and Rhythm: Normal rate and regular rhythm.      Heart sounds: No murmur heard.  No friction rub. No gallop.    Pulmonary:      Effort: Pulmonary effort is normal.      Breath sounds: Normal breath sounds. No wheezing or rhonchi.   Abdominal:      General: Bowel sounds are normal. There is no distension.      Palpations: Abdomen is soft.      Tenderness: There is no abdominal tenderness.   Skin:     General: Skin is warm and dry.   Neurological:      Mental Status: He is alert and oriented to person, place, and time.   Psychiatric:         Mood and Affect: Mood and affect normal.         Behavior: Behavior normal.         " Thought Content: Thought content normal.         Judgment: Judgment normal.        Result Review :     Common labs    Common Labsle 1/21/21 1/21/21 1/22/21 1/22/21 4/28/21 4/28/21    0507 0507 0528 0539 1358 1358   Glucose  112 (A)  97  92   BUN  16  13  16   Creatinine  1.36 (A)  1.28 (A)  1.33 (A)   Sodium  134 (A)  134 (A)  136   Potassium  4.3  3.9  3.9   Chloride  101  101  98 (A)   Calcium  9.1  8.0 (A)  8.9   Albumin  3.2 (A)    3.8   Total Bilirubin      0.48   Alkaline Phosphatase      80   AST (SGOT)      33   ALT (SGPT)      40   WBC 12.64 (A)  10.90 (A)  6.92    Hemoglobin 13.7 (A)  13.4 (A)  12.9 (A)    Hematocrit 42.7  41.5 (A)  40.5 (A)    Platelets 153  142  250    (A) Abnormal value       Comments are available for some flowsheets but are not being displayed.                     Assessment and Plan    Diagnoses and all orders for this visit:    1. Primary hypertension (Primary)  Assessment & Plan:  Hypertension is fairly controlled. Continue current regimen.    Orders:  -     CBC & Differential  -     Lipid Panel  -     TSH Rfx On Abnormal To Free T4  -     lisinopril-hydrochlorothiazide (PRINZIDE,ZESTORETIC) 20-25 MG per tablet; Take 1.5 tablets by mouth Daily.  Dispense: 135 tablet; Refill: 2    2. Stage 3a chronic kidney disease (HCC)  Assessment & Plan:  Renal condition is stable. Will check CMP and urinalysis today.    Orders:  -     Comprehensive Metabolic Panel  -     Urinalysis With Culture If Indicated -    3. Kidney mass  Assessment & Plan:  Continue follow-ups with Dr. Crowe      4. Screening for prostate cancer  -     PSA Screen    5. Encounter for hepatitis C screening test for low risk patient  -     Hepatitis C antibody    6. Long term (current) use of anticoagulants  Assessment & Plan:  History of pulmonary embolism and DVT. He is on lifelong anticoagulants.      Other orders  -     Discontinue: lisinopril-hydrochlorothiazide (PRINZIDE,ZESTORETIC) 20-25 MG per tablet; Take 1.5  tablets by mouth Daily.  Dispense: 135 tablet; Refill: 2      Follow Up   Return in about 3 months (around 2/11/2022), or if symptoms worsen or fail to improve, for Next scheduled follow up.  Patient was given instructions and counseling regarding his condition or for health maintenance advice. Please see specific information pulled into the AVS if appropriate.

## 2021-11-15 RX ORDER — ATORVASTATIN CALCIUM 10 MG/1
10 TABLET, FILM COATED ORAL DAILY
Qty: 90 TABLET | Refills: 1 | Status: SHIPPED | OUTPATIENT
Start: 2021-11-15 | End: 2022-06-06 | Stop reason: SDUPTHER

## 2022-02-11 ENCOUNTER — OFFICE VISIT (OUTPATIENT)
Dept: FAMILY MEDICINE CLINIC | Facility: CLINIC | Age: 67
End: 2022-02-11

## 2022-02-11 VITALS
DIASTOLIC BLOOD PRESSURE: 70 MMHG | TEMPERATURE: 97.3 F | OXYGEN SATURATION: 95 % | WEIGHT: 256.1 LBS | BODY MASS INDEX: 36.66 KG/M2 | HEART RATE: 74 BPM | SYSTOLIC BLOOD PRESSURE: 130 MMHG | HEIGHT: 70 IN

## 2022-02-11 DIAGNOSIS — E78.00 PURE HYPERCHOLESTEROLEMIA: ICD-10-CM

## 2022-02-11 DIAGNOSIS — N18.31 STAGE 3A CHRONIC KIDNEY DISEASE: ICD-10-CM

## 2022-02-11 DIAGNOSIS — Z79.01 LONG TERM (CURRENT) USE OF ANTICOAGULANTS: ICD-10-CM

## 2022-02-11 DIAGNOSIS — I10 ESSENTIAL HYPERTENSION: Primary | ICD-10-CM

## 2022-02-11 LAB
ALBUMIN SERPL-MCNC: 4.1 G/DL (ref 3.5–5.2)
ALBUMIN/GLOB SERPL: 1.2 G/DL
ALP SERPL-CCNC: 85 U/L (ref 39–117)
ALT SERPL W P-5'-P-CCNC: 40 U/L (ref 1–41)
ANION GAP SERPL CALCULATED.3IONS-SCNC: 13 MMOL/L (ref 5–15)
AST SERPL-CCNC: 30 U/L (ref 1–40)
BASOPHILS # BLD AUTO: 0.05 10*3/MM3 (ref 0–0.2)
BASOPHILS NFR BLD AUTO: 0.7 % (ref 0–1.5)
BILIRUB SERPL-MCNC: 0.3 MG/DL (ref 0–1.2)
BUN SERPL-MCNC: 15 MG/DL (ref 8–23)
BUN/CREAT SERPL: 11.3 (ref 7–25)
CALCIUM SPEC-SCNC: 9.1 MG/DL (ref 8.6–10.5)
CHLORIDE SERPL-SCNC: 100 MMOL/L (ref 98–107)
CHOLEST SERPL-MCNC: 121 MG/DL (ref 0–200)
CO2 SERPL-SCNC: 27 MMOL/L (ref 22–29)
CREAT SERPL-MCNC: 1.33 MG/DL (ref 0.76–1.27)
DEPRECATED RDW RBC AUTO: 42.1 FL (ref 37–54)
EOSINOPHIL # BLD AUTO: 0.23 10*3/MM3 (ref 0–0.4)
EOSINOPHIL NFR BLD AUTO: 3.4 % (ref 0.3–6.2)
ERYTHROCYTE [DISTWIDTH] IN BLOOD BY AUTOMATED COUNT: 12 % (ref 12.3–15.4)
GFR SERPL CREATININE-BSD FRML MDRD: 66 ML/MIN/1.73
GLOBULIN UR ELPH-MCNC: 3.4 GM/DL
GLUCOSE SERPL-MCNC: 89 MG/DL (ref 65–99)
HCT VFR BLD AUTO: 40 % (ref 37.5–51)
HDLC SERPL-MCNC: 57 MG/DL (ref 40–60)
HGB BLD-MCNC: 13.3 G/DL (ref 13–17.7)
IMM GRANULOCYTES # BLD AUTO: 0.02 10*3/MM3 (ref 0–0.05)
IMM GRANULOCYTES NFR BLD AUTO: 0.3 % (ref 0–0.5)
LDLC SERPL CALC-MCNC: 51 MG/DL (ref 0–100)
LDLC/HDLC SERPL: 0.91 {RATIO}
LYMPHOCYTES # BLD AUTO: 1.67 10*3/MM3 (ref 0.7–3.1)
LYMPHOCYTES NFR BLD AUTO: 24.6 % (ref 19.6–45.3)
MCH RBC QN AUTO: 32 PG (ref 26.6–33)
MCHC RBC AUTO-ENTMCNC: 33.3 G/DL (ref 31.5–35.7)
MCV RBC AUTO: 96.2 FL (ref 79–97)
MONOCYTES # BLD AUTO: 0.78 10*3/MM3 (ref 0.1–0.9)
MONOCYTES NFR BLD AUTO: 11.5 % (ref 5–12)
NEUTROPHILS NFR BLD AUTO: 4.03 10*3/MM3 (ref 1.7–7)
NEUTROPHILS NFR BLD AUTO: 59.5 % (ref 42.7–76)
NRBC BLD AUTO-RTO: 0 /100 WBC (ref 0–0.2)
PLATELET # BLD AUTO: 215 10*3/MM3 (ref 140–450)
PMV BLD AUTO: 10.4 FL (ref 6–12)
POTASSIUM SERPL-SCNC: 3.8 MMOL/L (ref 3.5–5.2)
PROT SERPL-MCNC: 7.5 G/DL (ref 6–8.5)
RBC # BLD AUTO: 4.16 10*6/MM3 (ref 4.14–5.8)
SODIUM SERPL-SCNC: 140 MMOL/L (ref 136–145)
TRIGL SERPL-MCNC: 62 MG/DL (ref 0–150)
VLDLC SERPL-MCNC: 13 MG/DL (ref 5–40)
WBC NRBC COR # BLD: 6.78 10*3/MM3 (ref 3.4–10.8)

## 2022-02-11 PROCEDURE — 99214 OFFICE O/P EST MOD 30 MIN: CPT | Performed by: NURSE PRACTITIONER

## 2022-02-11 PROCEDURE — 80061 LIPID PANEL: CPT | Performed by: NURSE PRACTITIONER

## 2022-02-11 PROCEDURE — 85025 COMPLETE CBC W/AUTO DIFF WBC: CPT | Performed by: NURSE PRACTITIONER

## 2022-02-11 PROCEDURE — 80053 COMPREHEN METABOLIC PANEL: CPT | Performed by: NURSE PRACTITIONER

## 2022-02-11 PROCEDURE — 36415 COLL VENOUS BLD VENIPUNCTURE: CPT | Performed by: NURSE PRACTITIONER

## 2022-02-11 RX ORDER — FLUTICASONE PROPIONATE 50 MCG
2 SPRAY, SUSPENSION (ML) NASAL DAILY
Qty: 16 G | Refills: 5 | Status: SHIPPED | OUTPATIENT
Start: 2022-02-11 | End: 2023-02-28 | Stop reason: SDUPTHER

## 2022-02-11 NOTE — PROGRESS NOTES
"Chief Complaint  Hypertension and Med Refill    Subjective          Facundo Hinds presents to [facility]  History of Present Illness  Presents today for 3-month follow-up for hypertension, chronic kidney disease, long-term use of anticoagulation for history of DVT and pulmonary embolism.  He denies shortness of breath, coughing, wheezing, chest pain.  No swelling in his legs.  Blood pressures well controlled.    Objective   Vital Signs:   /70   Pulse 74   Temp 97.3 °F (36.3 °C)   Ht 177.8 cm (70\")   Wt 116 kg (256 lb 1.6 oz)   SpO2 95%   BMI 36.75 kg/m²     Physical Exam  Vitals reviewed.   Constitutional:       Appearance: Normal appearance. He is well-developed. He is obese.   HENT:      Head: Normocephalic and atraumatic.      Right Ear: External ear normal.      Left Ear: External ear normal.      Mouth/Throat:      Pharynx: No oropharyngeal exudate.   Eyes:      Conjunctiva/sclera: Conjunctivae normal.      Pupils: Pupils are equal, round, and reactive to light.   Cardiovascular:      Rate and Rhythm: Normal rate and regular rhythm.      Heart sounds: No murmur heard.  No friction rub. No gallop.    Pulmonary:      Effort: Pulmonary effort is normal.      Breath sounds: Normal breath sounds. No wheezing or rhonchi.   Abdominal:      General: Bowel sounds are normal. There is no distension.      Palpations: Abdomen is soft.      Tenderness: There is no abdominal tenderness.   Skin:     General: Skin is warm and dry.   Neurological:      Mental Status: He is alert and oriented to person, place, and time.   Psychiatric:         Mood and Affect: Mood and affect normal.         Behavior: Behavior normal.         Thought Content: Thought content normal.         Judgment: Judgment normal.        Result Review :     Common labs    Common Labsle 4/28/21 4/28/21 11/11/21 11/11/21 11/11/21 11/11/21 2/11/22 2/11/22 2/11/22    1358 1358 0920 0920 0920 0920 0828 0828 0828 "   Glucose  92  93    89    BUN  16  16    15    Creatinine  1.33 (A)  1.28 (A)    1.33 (A)    eGFR  Am    69    66    Sodium  136  137    140    Potassium  3.9  4.0    3.8    Chloride  98 (A)  98    100    Calcium  8.9  9.8    9.1    Albumin  3.8  4.50    4.10    Total Bilirubin  0.48  0.3    0.3    Alkaline Phosphatase  80  82    85    AST (SGOT)  33  19    30    ALT (SGPT)  40  23    40    WBC 6.92  5.98    6.78     Hemoglobin 12.9 (A)  14.4    13.3     Hematocrit 40.5 (A)  42.9    40.0     Platelets 250  217    215     Total Cholesterol     233 (A)    121   Triglycerides     101    62   HDL Cholesterol     77 (A)    57   LDL Cholesterol      139 (A)    51   PSA      0.783      (A) Abnormal value       Comments are available for some flowsheets but are not being displayed.                     Assessment and Plan    Diagnoses and all orders for this visit:    1. Essential hypertension (Primary)  -     CBC & Differential  -     Comprehensive Metabolic Panel    2. Stage 3a chronic kidney disease (HCC)    3. Pure hypercholesterolemia  -     Lipid Panel    4. Long term (current) use of anticoagulants  -     apixaban (Eliquis) 5 MG tablet tablet; Take 1 tablet by mouth Every 12 (Twelve) Hours.  Dispense: 60 tablet; Refill: 2    Other orders  -     fluticasone (Flonase) 50 MCG/ACT nasal spray; 2 sprays into the nostril(s) as directed by provider Daily.  Dispense: 16 g; Refill: 5        Follow Up   Return in about 3 months (around 5/11/2022), or if symptoms worsen or fail to improve, for Annual physical.  Patient was given instructions and counseling regarding his condition or for health maintenance advice. Please see specific information pulled into the AVS if appropriate.

## 2022-05-11 ENCOUNTER — OFFICE VISIT (OUTPATIENT)
Dept: FAMILY MEDICINE CLINIC | Facility: CLINIC | Age: 67
End: 2022-05-11

## 2022-05-11 VITALS
OXYGEN SATURATION: 95 % | SYSTOLIC BLOOD PRESSURE: 132 MMHG | HEIGHT: 70 IN | TEMPERATURE: 97.8 F | WEIGHT: 261.4 LBS | BODY MASS INDEX: 37.42 KG/M2 | HEART RATE: 66 BPM | DIASTOLIC BLOOD PRESSURE: 84 MMHG

## 2022-05-11 DIAGNOSIS — E78.2 MIXED HYPERLIPIDEMIA: ICD-10-CM

## 2022-05-11 DIAGNOSIS — I10 PRIMARY HYPERTENSION: Primary | ICD-10-CM

## 2022-05-11 DIAGNOSIS — Z79.01 LONG TERM (CURRENT) USE OF ANTICOAGULANTS: ICD-10-CM

## 2022-05-11 DIAGNOSIS — N18.31 STAGE 3A CHRONIC KIDNEY DISEASE: ICD-10-CM

## 2022-05-11 PROCEDURE — 99213 OFFICE O/P EST LOW 20 MIN: CPT | Performed by: NURSE PRACTITIONER

## 2022-05-11 NOTE — PROGRESS NOTES
"Chief Complaint  Hypertension    Subjective          Facundo Hinds presents to [facility]  History of Present Illness  Presents today for a 2 to 3-month follow-up on hypertension, hyperlipidemia, stage III chronic kidney disease, and long-term use of anticoagulants for history of DVT and pulmonary embolism.  Patient denies chest pain, syncope, palpitations.  Denies shortness breath, coughing, wheezing.  He has a renal mass that he has been evaluated frequently with MRIs per urology/nephrology.  Last MRI showed stable mass.  Cholesterol significantly improved and is well controlled on atorvastatin 10 mg daily.    He states he had a pneumococcal vaccine on [place] his last office visit on post.  He does not recall which pneumococcal vaccine it was.  He will follow-up at his next office visit to let us know which vaccine so we can update his pneumococcal series.  Patient also in need of Tdap and shingles vaccine.    Objective   Vital Signs:  /84   Pulse 66   Temp 97.8 °F (36.6 °C)   Ht 177.8 cm (70\")   Wt 119 kg (261 lb 6.4 oz)   SpO2 95%   BMI 37.51 kg/m²     Class 2 Severe Obesity (BMI >=35 and <=39.9). Obesity-related health conditions include the following: hypertension, dyslipidemias and osteoarthritis. Obesity is unchanged. BMI is is above average; BMI management plan is completed. We discussed portion control and increasing exercise.      Physical Exam  Vitals reviewed.   Constitutional:       Appearance: Normal appearance. He is morbidly obese.   HENT:      Head: Normocephalic and atraumatic.      Right Ear: External ear normal.      Left Ear: External ear normal.      Mouth/Throat:      Pharynx: No oropharyngeal exudate.   Eyes:      Conjunctiva/sclera: Conjunctivae normal.      Pupils: Pupils are equal, round, and reactive to light.   Cardiovascular:      Rate and Rhythm: Normal rate and regular rhythm.      Heart sounds: No murmur heard.    No friction rub. " No gallop.   Pulmonary:      Effort: Pulmonary effort is normal.      Breath sounds: Normal breath sounds. No wheezing or rhonchi.   Abdominal:      General: Bowel sounds are normal. There is no distension.      Palpations: Abdomen is soft.      Tenderness: There is no abdominal tenderness.   Skin:     General: Skin is warm and dry.   Neurological:      Mental Status: He is alert and oriented to person, place, and time.      Cranial Nerves: No cranial nerve deficit.   Psychiatric:         Mood and Affect: Mood and affect normal.         Behavior: Behavior normal.         Thought Content: Thought content normal.         Judgment: Judgment normal.        Result Review :                 Assessment and Plan    Diagnoses and all orders for this visit:    1. Primary hypertension (Primary)    2. Stage 3a chronic kidney disease (HCC)    3. Long term (current) use of anticoagulants    4. Mixed hyperlipidemia    Blood pressures fairly controlled.  Continue lisinopril hydrochlorothiazide 20-25 mg 1 and half tablets daily.  Cholesterol is well controlled.  Continue atorvastatin 10 mg daily.  Continue taking Eliquis 5 mg twice daily for long-term anticoagulation for history of DVT and pulmonary embolism.         Follow Up   Return in about 6 months (around 11/11/2022), or if symptoms worsen or fail to improve, for Next scheduled follow up.  Patient was given instructions and counseling regarding his condition or for health maintenance advice. Please see specific information pulled into the AVS if appropriate.

## 2022-05-24 ENCOUNTER — OFFICE VISIT (OUTPATIENT)
Dept: FAMILY MEDICINE CLINIC | Facility: CLINIC | Age: 67
End: 2022-05-24

## 2022-05-24 VITALS
DIASTOLIC BLOOD PRESSURE: 60 MMHG | HEART RATE: 72 BPM | WEIGHT: 263.2 LBS | HEIGHT: 68 IN | SYSTOLIC BLOOD PRESSURE: 100 MMHG | OXYGEN SATURATION: 97 % | TEMPERATURE: 99 F | BODY MASS INDEX: 39.89 KG/M2

## 2022-05-24 DIAGNOSIS — J20.9 ACUTE BRONCHITIS, UNSPECIFIED ORGANISM: Primary | ICD-10-CM

## 2022-05-24 DIAGNOSIS — J00 ACUTE RHINITIS: ICD-10-CM

## 2022-05-24 DIAGNOSIS — R05.9 COUGH: ICD-10-CM

## 2022-05-24 PROCEDURE — 99213 OFFICE O/P EST LOW 20 MIN: CPT | Performed by: NURSE PRACTITIONER

## 2022-05-24 RX ORDER — CETIRIZINE HYDROCHLORIDE 10 MG/1
10 TABLET ORAL DAILY
Qty: 30 TABLET | Refills: 2 | Status: SHIPPED | OUTPATIENT
Start: 2022-05-24 | End: 2022-08-12 | Stop reason: SDUPTHER

## 2022-05-24 RX ORDER — BENZONATATE 200 MG/1
200 CAPSULE ORAL 3 TIMES DAILY PRN
Qty: 30 CAPSULE | Refills: 1 | Status: SHIPPED | OUTPATIENT
Start: 2022-05-24 | End: 2022-11-11

## 2022-05-24 RX ORDER — AZITHROMYCIN 250 MG/1
TABLET, FILM COATED ORAL
Qty: 6 TABLET | Refills: 0 | Status: SHIPPED | OUTPATIENT
Start: 2022-05-24 | End: 2022-11-11

## 2022-05-24 RX ORDER — BENZONATATE 200 MG/1
200 CAPSULE ORAL 3 TIMES DAILY PRN
Qty: 30 CAPSULE | Refills: 1 | Status: SHIPPED | OUTPATIENT
Start: 2022-05-24 | End: 2022-05-24

## 2022-05-24 NOTE — PROGRESS NOTES
"Chief Complaint  Headache, Cough, Fever, and Nasal Congestion    Subjective          Facundo Hinds presents to [facility]  History of Present Illness  Presents today for an acute visit for sinusitis.  Patient reports he has been having headaches, congestion, cough, drainage, low-grade fever of 100 over the past week.  Symptoms began May 13.  4 days later he went to urgent care and was diagnosed with maxillary sinusitis.  He was started on Augmentin, Tessalon Perles, and a Medrol Dosepak.  He reports some of his symptoms are improving.  He states he has a very productive cough, continually having low-grade fevers, and nasal drainage.    Objective   Vital Signs:  /60   Pulse 72   Temp 99 °F (37.2 °C)   Ht 172.7 cm (68\")   Wt 119 kg (263 lb 3.2 oz)   SpO2 97%   BMI 40.02 kg/m²         Physical Exam  Vitals reviewed.   Constitutional:       Appearance: Normal appearance. He is morbidly obese.   HENT:      Head: Normocephalic and atraumatic.      Right Ear: External ear normal.      Left Ear: External ear normal.      Mouth/Throat:      Pharynx: No oropharyngeal exudate.   Eyes:      Conjunctiva/sclera: Conjunctivae normal.      Pupils: Pupils are equal, round, and reactive to light.   Cardiovascular:      Rate and Rhythm: Normal rate and regular rhythm.      Heart sounds: No murmur heard.    No friction rub. No gallop.   Pulmonary:      Effort: Pulmonary effort is normal.      Breath sounds: Rhonchi present. No wheezing.   Abdominal:      General: Bowel sounds are normal. There is no distension.      Palpations: Abdomen is soft.      Tenderness: There is no abdominal tenderness.   Skin:     General: Skin is warm and dry.   Neurological:      Mental Status: He is alert and oriented to person, place, and time.        Result Review :                 Assessment and Plan    Diagnoses and all orders for this visit:    1. Acute bronchitis, unspecified organism (Primary)  -     " azithromycin (Zithromax Z-Olayinka) 250 MG tablet; Take 2 tablets by mouth on day 1, then 1 tablet daily on days 2-5  Dispense: 6 tablet; Refill: 0    2. Cough  -     benzonatate (TESSALON) 200 MG capsule; Take 1 capsule by mouth 3 (Three) Times a Day As Needed for Cough.  Dispense: 30 capsule; Refill: 1    3. Acute rhinitis  -     cetirizine (zyrTEC) 10 MG tablet; Take 1 tablet by mouth Daily.  Dispense: 30 tablet; Refill: 2    Other orders  -     Discontinue: benzonatate (TESSALON) 200 MG capsule; Take 1 capsule by mouth 3 (Three) Times a Day As Needed for Cough.  Dispense: 30 capsule; Refill: 1    Discussed symptomatic treatment clued increase fluid intake throat lozenges and warm fluids.         Follow Up   Return if symptoms worsen or fail to improve.  Patient was given instructions and counseling regarding his condition or for health maintenance advice. Please see specific information pulled into the AVS if appropriate.

## 2022-06-06 DIAGNOSIS — Z79.01 LONG TERM (CURRENT) USE OF ANTICOAGULANTS: ICD-10-CM

## 2022-06-06 RX ORDER — ATORVASTATIN CALCIUM 10 MG/1
10 TABLET, FILM COATED ORAL DAILY
Qty: 90 TABLET | Refills: 1 | Status: SHIPPED | OUTPATIENT
Start: 2022-06-06 | End: 2022-09-13 | Stop reason: SDUPTHER

## 2022-08-12 DIAGNOSIS — J00 ACUTE RHINITIS: ICD-10-CM

## 2022-08-15 RX ORDER — CETIRIZINE HYDROCHLORIDE 10 MG/1
10 TABLET ORAL DAILY
Qty: 30 TABLET | Refills: 2 | Status: SHIPPED | OUTPATIENT
Start: 2022-08-15 | End: 2022-11-30 | Stop reason: SDUPTHER

## 2022-08-23 DIAGNOSIS — Z79.01 LONG TERM (CURRENT) USE OF ANTICOAGULANTS: ICD-10-CM

## 2022-09-12 ENCOUNTER — TELEPHONE (OUTPATIENT)
Dept: FAMILY MEDICINE CLINIC | Facility: CLINIC | Age: 67
End: 2022-09-12

## 2022-09-12 NOTE — TELEPHONE ENCOUNTER
PATIENT CAME IN REQUESTING REFILLS; OF THE FOLLOWING PRESCRIPTIONS;   APIXABAN 5 MG  ATORVASTATIN 10 MG    PLEASE ADVISE

## 2022-09-13 DIAGNOSIS — Z79.01 LONG TERM (CURRENT) USE OF ANTICOAGULANTS: ICD-10-CM

## 2022-09-13 RX ORDER — ATORVASTATIN CALCIUM 10 MG/1
10 TABLET, FILM COATED ORAL DAILY
Qty: 90 TABLET | Refills: 1 | Status: SHIPPED | OUTPATIENT
Start: 2022-09-13 | End: 2022-11-30 | Stop reason: SDUPTHER

## 2022-10-29 DIAGNOSIS — I10 PRIMARY HYPERTENSION: ICD-10-CM

## 2022-10-31 RX ORDER — LISINOPRIL AND HYDROCHLOROTHIAZIDE 25; 20 MG/1; MG/1
1.5 TABLET ORAL DAILY
Qty: 135 TABLET | Refills: 2 | Status: SHIPPED | OUTPATIENT
Start: 2022-10-31

## 2022-11-11 ENCOUNTER — HOSPITAL ENCOUNTER (OUTPATIENT)
Dept: GENERAL RADIOLOGY | Facility: HOSPITAL | Age: 67
Discharge: HOME | End: 2022-11-11

## 2022-11-11 ENCOUNTER — OFFICE VISIT (OUTPATIENT)
Dept: FAMILY MEDICINE CLINIC | Facility: CLINIC | Age: 67
End: 2022-11-11

## 2022-11-11 VITALS
DIASTOLIC BLOOD PRESSURE: 66 MMHG | HEIGHT: 68 IN | SYSTOLIC BLOOD PRESSURE: 118 MMHG | BODY MASS INDEX: 39.53 KG/M2 | HEART RATE: 76 BPM | TEMPERATURE: 97.9 F | WEIGHT: 260.8 LBS | OXYGEN SATURATION: 95 %

## 2022-11-11 DIAGNOSIS — M25.532 BILATERAL WRIST PAIN: Primary | ICD-10-CM

## 2022-11-11 DIAGNOSIS — M25.532 BILATERAL WRIST PAIN: ICD-10-CM

## 2022-11-11 DIAGNOSIS — M25.531 BILATERAL WRIST PAIN: ICD-10-CM

## 2022-11-11 DIAGNOSIS — I10 PRIMARY HYPERTENSION: ICD-10-CM

## 2022-11-11 DIAGNOSIS — M25.431 SWELLING OF BOTH WRISTS: ICD-10-CM

## 2022-11-11 DIAGNOSIS — M25.432 SWELLING OF BOTH WRISTS: ICD-10-CM

## 2022-11-11 DIAGNOSIS — N18.31 STAGE 3A CHRONIC KIDNEY DISEASE: ICD-10-CM

## 2022-11-11 DIAGNOSIS — Z12.5 SCREENING PSA (PROSTATE SPECIFIC ANTIGEN): ICD-10-CM

## 2022-11-11 DIAGNOSIS — M25.531 BILATERAL WRIST PAIN: Primary | ICD-10-CM

## 2022-11-11 PROCEDURE — 85025 COMPLETE CBC W/AUTO DIFF WBC: CPT | Performed by: NURSE PRACTITIONER

## 2022-11-11 PROCEDURE — 73110 X-RAY EXAM OF WRIST: CPT

## 2022-11-11 PROCEDURE — 86431 RHEUMATOID FACTOR QUANT: CPT | Performed by: NURSE PRACTITIONER

## 2022-11-11 PROCEDURE — 86140 C-REACTIVE PROTEIN: CPT | Performed by: NURSE PRACTITIONER

## 2022-11-11 PROCEDURE — 86038 ANTINUCLEAR ANTIBODIES: CPT | Performed by: NURSE PRACTITIONER

## 2022-11-11 PROCEDURE — 36415 COLL VENOUS BLD VENIPUNCTURE: CPT | Performed by: NURSE PRACTITIONER

## 2022-11-11 PROCEDURE — 84550 ASSAY OF BLOOD/URIC ACID: CPT | Performed by: NURSE PRACTITIONER

## 2022-11-11 PROCEDURE — 80053 COMPREHEN METABOLIC PANEL: CPT | Performed by: NURSE PRACTITIONER

## 2022-11-11 PROCEDURE — 99214 OFFICE O/P EST MOD 30 MIN: CPT | Performed by: NURSE PRACTITIONER

## 2022-11-11 PROCEDURE — G0103 PSA SCREENING: HCPCS | Performed by: NURSE PRACTITIONER

## 2022-11-11 NOTE — PROGRESS NOTES
"Answers for HPI/ROS submitted by the patient on 11/7/2022  What is the primary reason for your visit?: Other  Please describe your symptoms.: follow up from last visit  Have you had these symptoms before?: Yes  How long have you been having these symptoms?: Greater than 2 weeks    Chief Complaint  Hypertension, Hyperlipidemia, stage 3a chronic kidney disease, Upper Extremity Issue (Bilateral hand swelling), and Hand Pain (Left hand)    Subjective         Facundo Hinds presents to [facility]  HPI   Presents today for 6-month follow-up on hypertension, hyperlipidemia, stage III A chronic kidney disease.  He does have 1 kidney.  Blood pressure is doing well.  Denies chest pain, syncope palpitations.  He is having bilateral wrist pain with swelling.  Pain is worse with after activities.  He has been taking Tylenol as needed.  At times he uses wrist brace which has not helped.    Social History     Socioeconomic History   • Marital status:    Tobacco Use   • Smoking status: Never     Passive exposure: Yes   • Smokeless tobacco: Never   Vaping Use   • Vaping Use: Never used   Substance and Sexual Activity   • Alcohol use: Never   • Drug use: Never   • Sexual activity: Not Currently     Partners: Female     Birth control/protection: None        Objective     Vitals:    11/11/22 1006   BP: 118/66   BP Location: Left arm   Patient Position: Sitting   Pulse: 76   Temp: 97.9 °F (36.6 °C)   TempSrc: Oral   SpO2: 95%   Weight: 118 kg (260 lb 12.8 oz)   Height: 172.7 cm (68\")        Body mass index is 39.65 kg/m².    Wt Readings from Last 3 Encounters:   11/11/22 118 kg (260 lb 12.8 oz)   05/24/22 119 kg (263 lb 3.2 oz)   05/17/22 119 kg (262 lb 5.6 oz)       BP Readings from Last 3 Encounters:   11/11/22 118/66   05/24/22 100/60   05/17/22 116/73         Physical Exam  Vitals reviewed.   Constitutional:       Appearance: Normal appearance. He is well-developed.   HENT:      Head: " Normocephalic and atraumatic.      Right Ear: External ear normal.      Left Ear: External ear normal.      Mouth/Throat:      Pharynx: No oropharyngeal exudate.   Eyes:      Conjunctiva/sclera: Conjunctivae normal.      Pupils: Pupils are equal, round, and reactive to light.   Cardiovascular:      Rate and Rhythm: Normal rate and regular rhythm.      Heart sounds: No murmur heard.    No friction rub. No gallop.   Pulmonary:      Effort: Pulmonary effort is normal.      Breath sounds: Normal breath sounds. No wheezing or rhonchi.   Abdominal:      General: Bowel sounds are normal. There is no distension.      Palpations: Abdomen is soft.      Tenderness: There is no abdominal tenderness.   Musculoskeletal:      Right wrist: Swelling and tenderness present.      Left wrist: Swelling and tenderness present.   Skin:     General: Skin is warm and dry.   Neurological:      Mental Status: He is alert and oriented to person, place, and time.   Psychiatric:         Mood and Affect: Mood and affect normal.         Behavior: Behavior normal.         Thought Content: Thought content normal.         Judgment: Judgment normal.          Result Review :   The following data was reviewed by: RASHIDA Pugh on 11/11/2022:      Procedures    Assessment and Plan   Diagnoses and all orders for this visit:    1. Bilateral wrist pain (Primary)  -     XR Wrist 3+ View Left; Future  -     XR Wrist 3+ View Right; Future  -     GRETEL With / DsDNA, RNP, Sjogrens A / B, Smith  -     C-reactive Protein  -     Uric Acid  -     Rheumatoid Factor  -     Diclofenac Sodium (VOLTAREN) 1 % gel gel; Apply 2 g topically to the appropriate area as directed 2 (Two) Times a Day As Needed (bilateral wrist pain).  Dispense: 100 g; Refill: 0    2. Swelling of both wrists  -     XR Wrist 3+ View Left; Future  -     XR Wrist 3+ View Right; Future  -     GRETEL With / DsDNA, RNP, Sjogrens A / B, Smith  -     C-reactive Protein  -     Uric Acid  -     Rheumatoid  Factor    3. Primary hypertension  -     Comprehensive metabolic panel  -     CBC w AUTO Differential    4. Screening PSA (prostate specific antigen)  -     PSA SCREENING    5. Stage 3a chronic kidney disease (HCC)    Check the following labs for wrist pain including GRETEL, uric acid, CRP, rheumatoid factor.  Prescribe diclofenac Voltaren gel to apply twice daily as needed for wrist pain.  Check CBC CMP for hypertension.  PSA for prostate screening.        Follow Up   Return in about 2 months (around 1/11/2023) for Medicare Wellness.  Patient was given instructions and counseling regarding his condition or for health maintenance advice. Please see specific information pulled into the AVS if appropriate.     Please note that portions of this note were completed with a voice recognition program.

## 2022-11-12 LAB
ALBUMIN SERPL-MCNC: 4.1 G/DL (ref 3.5–5.2)
ALBUMIN/GLOB SERPL: 1.2 G/DL
ALP SERPL-CCNC: 100 U/L (ref 39–117)
ALT SERPL W P-5'-P-CCNC: 29 U/L (ref 1–41)
ANION GAP SERPL CALCULATED.3IONS-SCNC: 10 MMOL/L (ref 5–15)
AST SERPL-CCNC: 27 U/L (ref 1–40)
BASOPHILS # BLD AUTO: 0.05 10*3/MM3 (ref 0–0.2)
BASOPHILS NFR BLD AUTO: 0.7 % (ref 0–1.5)
BILIRUB SERPL-MCNC: 0.3 MG/DL (ref 0–1.2)
BUN SERPL-MCNC: 16 MG/DL (ref 8–23)
BUN/CREAT SERPL: 11.9 (ref 7–25)
CALCIUM SPEC-SCNC: 9.4 MG/DL (ref 8.6–10.5)
CHLORIDE SERPL-SCNC: 99 MMOL/L (ref 98–107)
CHROMATIN AB SERPL-ACNC: 35 IU/ML (ref 0–14)
CO2 SERPL-SCNC: 27 MMOL/L (ref 22–29)
CREAT SERPL-MCNC: 1.34 MG/DL (ref 0.76–1.27)
CRP SERPL-MCNC: 1.42 MG/DL (ref 0–0.5)
DEPRECATED RDW RBC AUTO: 42.3 FL (ref 37–54)
EGFRCR SERPLBLD CKD-EPI 2021: 58.8 ML/MIN/1.73
EOSINOPHIL # BLD AUTO: 0.21 10*3/MM3 (ref 0–0.4)
EOSINOPHIL NFR BLD AUTO: 2.8 % (ref 0.3–6.2)
ERYTHROCYTE [DISTWIDTH] IN BLOOD BY AUTOMATED COUNT: 12.1 % (ref 12.3–15.4)
GLOBULIN UR ELPH-MCNC: 3.4 GM/DL
GLUCOSE SERPL-MCNC: 83 MG/DL (ref 65–99)
HCT VFR BLD AUTO: 38.2 % (ref 37.5–51)
HGB BLD-MCNC: 12.6 G/DL (ref 13–17.7)
IMM GRANULOCYTES # BLD AUTO: 0.03 10*3/MM3 (ref 0–0.05)
IMM GRANULOCYTES NFR BLD AUTO: 0.4 % (ref 0–0.5)
LYMPHOCYTES # BLD AUTO: 1.54 10*3/MM3 (ref 0.7–3.1)
LYMPHOCYTES NFR BLD AUTO: 20.2 % (ref 19.6–45.3)
MCH RBC QN AUTO: 31.3 PG (ref 26.6–33)
MCHC RBC AUTO-ENTMCNC: 33 G/DL (ref 31.5–35.7)
MCV RBC AUTO: 95 FL (ref 79–97)
MONOCYTES # BLD AUTO: 0.91 10*3/MM3 (ref 0.1–0.9)
MONOCYTES NFR BLD AUTO: 11.9 % (ref 5–12)
NEUTROPHILS NFR BLD AUTO: 4.89 10*3/MM3 (ref 1.7–7)
NEUTROPHILS NFR BLD AUTO: 64 % (ref 42.7–76)
NRBC BLD AUTO-RTO: 0 /100 WBC (ref 0–0.2)
PLATELET # BLD AUTO: 222 10*3/MM3 (ref 140–450)
PMV BLD AUTO: 10.5 FL (ref 6–12)
POTASSIUM SERPL-SCNC: 4.4 MMOL/L (ref 3.5–5.2)
PROT SERPL-MCNC: 7.5 G/DL (ref 6–8.5)
PSA SERPL-MCNC: 1.69 NG/ML (ref 0–4)
RBC # BLD AUTO: 4.02 10*6/MM3 (ref 4.14–5.8)
SODIUM SERPL-SCNC: 136 MMOL/L (ref 136–145)
URATE SERPL-MCNC: 7.5 MG/DL (ref 3.4–7)
WBC NRBC COR # BLD: 7.63 10*3/MM3 (ref 3.4–10.8)

## 2022-11-14 LAB — ANA SER QL: NEGATIVE

## 2022-11-15 DIAGNOSIS — M25.531 BILATERAL WRIST PAIN: ICD-10-CM

## 2022-11-15 DIAGNOSIS — M25.432 SWELLING OF BOTH WRISTS: ICD-10-CM

## 2022-11-15 DIAGNOSIS — M25.532 BILATERAL WRIST PAIN: ICD-10-CM

## 2022-11-15 DIAGNOSIS — R76.8 RHEUMATOID FACTOR POSITIVE: Primary | ICD-10-CM

## 2022-11-15 DIAGNOSIS — M25.431 SWELLING OF BOTH WRISTS: ICD-10-CM

## 2022-11-15 DIAGNOSIS — E79.0 HYPERURICEMIA: ICD-10-CM

## 2022-11-15 RX ORDER — ALLOPURINOL 100 MG/1
50 TABLET ORAL DAILY
Qty: 45 TABLET | Refills: 2 | Status: SHIPPED | OUTPATIENT
Start: 2022-11-15 | End: 2023-01-12

## 2022-11-30 DIAGNOSIS — J00 ACUTE RHINITIS: ICD-10-CM

## 2022-12-01 RX ORDER — CETIRIZINE HYDROCHLORIDE 10 MG/1
10 TABLET ORAL DAILY
Qty: 90 TABLET | Refills: 1 | Status: SHIPPED | OUTPATIENT
Start: 2022-12-01

## 2022-12-01 RX ORDER — ATORVASTATIN CALCIUM 10 MG/1
10 TABLET, FILM COATED ORAL DAILY
Qty: 90 TABLET | Refills: 1 | Status: SHIPPED | OUTPATIENT
Start: 2022-12-01

## 2023-01-12 ENCOUNTER — OFFICE VISIT (OUTPATIENT)
Dept: FAMILY MEDICINE CLINIC | Facility: CLINIC | Age: 68
End: 2023-01-12
Payer: MEDICARE

## 2023-01-12 VITALS
BODY MASS INDEX: 38.2 KG/M2 | TEMPERATURE: 98.6 F | SYSTOLIC BLOOD PRESSURE: 118 MMHG | HEIGHT: 70 IN | HEART RATE: 56 BPM | OXYGEN SATURATION: 95 % | DIASTOLIC BLOOD PRESSURE: 64 MMHG | WEIGHT: 266.8 LBS

## 2023-01-12 DIAGNOSIS — M05.9 RHEUMATOID ARTHRITIS WITH POSITIVE RHEUMATOID FACTOR, INVOLVING UNSPECIFIED SITE: ICD-10-CM

## 2023-01-12 DIAGNOSIS — I10 PRIMARY HYPERTENSION: ICD-10-CM

## 2023-01-12 DIAGNOSIS — E78.2 MIXED HYPERLIPIDEMIA: ICD-10-CM

## 2023-01-12 DIAGNOSIS — N18.31 STAGE 3A CHRONIC KIDNEY DISEASE: ICD-10-CM

## 2023-01-12 DIAGNOSIS — Z79.01 LONG TERM (CURRENT) USE OF ANTICOAGULANTS: ICD-10-CM

## 2023-01-12 DIAGNOSIS — Z86.711 HISTORY OF PULMONARY EMBOLISM: ICD-10-CM

## 2023-01-12 DIAGNOSIS — Z00.00 MEDICARE ANNUAL WELLNESS VISIT, INITIAL: Primary | ICD-10-CM

## 2023-01-12 DIAGNOSIS — Z86.718 HISTORY OF DVT (DEEP VEIN THROMBOSIS): ICD-10-CM

## 2023-01-12 DIAGNOSIS — Z90.5 HISTORY OF NEPHRECTOMY: ICD-10-CM

## 2023-01-12 PROCEDURE — 1170F FXNL STATUS ASSESSED: CPT | Performed by: NURSE PRACTITIONER

## 2023-01-12 PROCEDURE — G0402 INITIAL PREVENTIVE EXAM: HCPCS | Performed by: NURSE PRACTITIONER

## 2023-01-12 PROCEDURE — 1126F AMNT PAIN NOTED NONE PRSNT: CPT | Performed by: NURSE PRACTITIONER

## 2023-01-12 PROCEDURE — 1159F MED LIST DOCD IN RCRD: CPT | Performed by: NURSE PRACTITIONER

## 2023-01-12 RX ORDER — MULTIPLE VITAMINS W/ MINERALS TAB 9MG-400MCG
1 TAB ORAL DAILY
COMMUNITY

## 2023-01-12 RX ORDER — ATORVASTATIN CALCIUM 10 MG/1
1 TABLET, FILM COATED ORAL DAILY
COMMUNITY
Start: 2022-12-01 | End: 2023-01-12 | Stop reason: SDUPTHER

## 2023-01-12 RX ORDER — ACETAMINOPHEN 500 MG
500 TABLET ORAL EVERY 6 HOURS PRN
COMMUNITY

## 2023-01-12 RX ORDER — CETIRIZINE HYDROCHLORIDE 10 MG/1
1 TABLET ORAL DAILY
COMMUNITY
Start: 2022-12-01 | End: 2023-01-12 | Stop reason: SDUPTHER

## 2023-01-12 RX ORDER — PREDNISONE 10 MG/1
TABLET ORAL
COMMUNITY
Start: 2023-01-04

## 2023-01-12 NOTE — PROGRESS NOTES
The ABCs of the Annual Wellness Visit  Initial Medicare Wellness Visit    Subjective     Facundo Hinds is a 67 y.o. male who presents for an Initial Medicare Wellness Visit.    The following portions of the patient's history were reviewed and   updated as appropriate: allergies, current medications, past family history, past medical history, past social history, past surgical history and problem list.     Compared to one year ago, the patient feels his physical   health is the same.    Compared to one year ago, the patient feels his mental   health is the same.    Recent Hospitalizations:  He was not admitted to the hospital during the last year.       Current Medical Providers:  Patient Care Team:  Shailesh Barr APRN as PCP - General (Nurse Practitioner)    Outpatient Medications Prior to Visit   Medication Sig Dispense Refill   • acetaminophen (TYLENOL) 500 MG tablet Take 500 mg by mouth Every 6 (Six) Hours As Needed for Mild Pain.     • apixaban (ELIQUIS) 5 MG tablet tablet      • atorvastatin (Lipitor) 10 MG tablet Take 1 tablet by mouth Daily. 90 tablet 1   • cetirizine (zyrTEC) 10 MG tablet Take 1 tablet by mouth Daily. 90 tablet 1   • Diclofenac Sodium (VOLTAREN) 1 % gel gel Apply 2 g topically to the appropriate area as directed 2 (Two) Times a Day As Needed (bilateral wrist pain). 100 g 0   • fluticasone (Flonase) 50 MCG/ACT nasal spray 2 sprays into the nostril(s) as directed by provider Daily. 16 g 5   • lisinopril-hydrochlorothiazide (PRINZIDE,ZESTORETIC) 20-25 MG per tablet Take 1.5 tablets by mouth Daily. 135 tablet 2   • multivitamin with minerals tablet tablet Take 1 tablet by mouth Daily.     • predniSONE (DELTASONE) 10 MG tablet      • allopurinol (ZYLOPRIM) 100 MG tablet Take 0.5 tablets by mouth Daily. 45 tablet 2   • atorvastatin (LIPITOR) 10 MG tablet Take 1 tablet by mouth Daily.     • cetirizine (zyrTEC) 10 MG tablet Take 1 tablet by mouth Daily.       No facility-administered  "medications prior to visit.       No opioid medication identified on active medication list. I have reviewed chart for other potential  high risk medication/s and harmful drug interactions in the elderly.          Aspirin is not on active medication list.  Aspirin use is contraindicated for this patient due to: current use of Eliquis.  .    Patient Active Problem List   Diagnosis   • Cancer (HCC)   • Deafness   • EKG abnormalities   • Hemorrhoids   • Hypertension   • Stage 3a chronic kidney disease (HCC)   • Night sweats   • Shortness of breath   • Sinus trouble   • Long term (current) use of anticoagulants   • Obesity due to excess calories   • History of pulmonary embolism   • History of DVT (deep vein thrombosis)   • Kidney mass   • Mixed hyperlipidemia     Advance Care Planning  Advance Directive is not on file.  ACP discussion was held with the patient during this visit. Patient does not have an advance directive, information provided.       Objective    Vitals:    01/12/23 0913   BP: 118/64   Pulse: 56   Temp: 98.6 °F (37 °C)   SpO2: 95%   Weight: 121 kg (266 lb 12.8 oz)   Height: 177.8 cm (70\")   PainSc:   6     Estimated body mass index is 38.28 kg/m² as calculated from the following:    Height as of this encounter: 177.8 cm (70\").    Weight as of this encounter: 121 kg (266 lb 12.8 oz).    Class 2 Severe Obesity (BMI >=35 and <=39.9). Obesity-related health conditions include the following: hypertension and dyslipidemias. Obesity is improving with treatment. BMI is is above average; BMI management plan is completed. We discussed portion control and increasing exercise.      Does the patient have evidence of cognitive impairment?   No          HEALTH RISK ASSESSMENT    Smoking Status:  Social History     Tobacco Use   Smoking Status Never   • Passive exposure: Yes   Smokeless Tobacco Never     Alcohol Consumption:  Social History     Substance and Sexual Activity   Alcohol Use Never     Fall Risk " Screen:    INOCENCIA Fall Risk Assessment was completed, and patient is at LOW risk for falls.Assessment completed on:1/12/2023    Depression Screen:   PHQ-2/PHQ-9 Depression Screening 1/12/2023   Little Interest or Pleasure in Doing Things 0-->not at all   Feeling Down, Depressed or Hopeless 0-->not at all   Trouble Falling or Staying Asleep, or Sleeping Too Much -   Feeling Tired or Having Little Energy -   Poor Appetite or Overeating -   Feeling Bad about Yourself - or that You are a Failure or Have Let Yourself or Your Family Down -   Trouble Concentrating on Things, Such as Reading the Newspaper or Watching Television -   Moving or Speaking So Slowly that Other People Could Have Noticed? Or the Opposite - Being So Fidgety -   Thoughts that You Would be Better Off Dead or of Hurting Yourself in Some Way -   PHQ-9: Brief Depression Severity Measure Score 0   If You Checked Off Any Problems, How Difficult Have These Problems Made It For You to Do Your Work, Take Care of Things at Home, or Get Along with Other People? -       Health Habits and Functional and Cognitive Screening:  Functional & Cognitive Status 1/12/2023   Do you have difficulty preparing food and eating? No   Do you have difficulty bathing yourself, getting dressed or grooming yourself? No   Do you have difficulty using the toilet? No   Do you have difficulty moving around from place to place? No   Do you have trouble with steps or getting out of a bed or a chair? No   Current Diet Well Balanced Diet   Dental Exam Not up to date   Eye Exam Up to date   Exercise (times per week) 3 times per week   Current Exercises Include Walking   Do you need help using the phone?  No   Are you deaf or do you have serious difficulty hearing?  No   Do you need help with transportation? No   Do you need help shopping? No   Do you need help preparing meals?  No   Do you need help with housework?  No   Do you need help with laundry? No   Do you need help taking your  medications? No   Do you need help managing money? No   Do you ever drive or ride in a car without wearing a seat belt? No   Have you felt unusual stress, anger or loneliness in the last month? No   Who do you live with? Spouse   If you need help, do you have trouble finding someone available to you? No   Have you been bothered in the last four weeks by sexual problems? No   Do you have difficulty concentrating, remembering or making decisions? No       Age-appropriate Screening Schedule:  Refer to the list below for future screening recommendations based on patient's age, sex and/or medical conditions. Orders for these recommended tests are listed in the plan section. The patient has been provided with a written plan.    Health Maintenance   Topic Date Due   • TDAP/TD VACCINES (1 - Tdap) Never done   • ZOSTER VACCINE (1 of 2) Never done   • INFLUENZA VACCINE  03/31/2023 (Originally 8/1/2022)   • LIPID PANEL  02/11/2023          CMS Preventative Services Quick Reference  Risk Factors Identified During Encounter    Hearing Problem: pt  has seen ENT and had hearing test which showed hearing loss in jenny ears    The above risks/problems have been discussed with the patient.  Pertinent information has been shared with the patient in the After Visit Summary.  An After Visit Summary and PPPS were made available to the patient.  Diagnoses and all orders for this visit:    1. Medicare annual wellness visit, initial (Primary)    2. Rheumatoid arthritis with positive rheumatoid factor, involving unspecified site (HCC)    3. Primary hypertension  -     CBC & Differential; Future  -     Comprehensive Metabolic Panel; Future  -     TSH Rfx On Abnormal To Free T4; Future    4. Mixed hyperlipidemia  -     Lipid Panel; Future    5. Stage 3a chronic kidney disease (HCC)  -     CBC & Differential; Future  -     Comprehensive Metabolic Panel; Future  -     Urinalysis With Culture If Indicated -; Future    6. History of  nephrectomy    7. History of pulmonary embolism    8. History of DVT (deep vein thrombosis)    9. Long term (current) use of anticoagulants    Patient was recently diagnosed with rheumatoid arthritis.  He is seeing rheumatologist in [place].  He was prescribed prednisone over the past 2 weeks.  He had hyperuricemia.  We will discontinue allopurinol at this time.  Blood pressures well controlled.  Has chronic kidney disease stage IIIa.  He has follow-up with nephrologist in 6 months.  We will check the following labs prior to his next visit.  Is due colorectal cancer screening this year.  Last one was done 5 years ago.  Follow-up in 3 months    Follow Up:  Next Medicare Wellness visit to be scheduled in 1 year.        Additional E&M Note during same encounter follows:  Patient has multiple medical problems which are significant and separately identifiable that require additional work above and beyond the Medicare Wellness Visit.

## 2023-02-28 ENCOUNTER — TELEPHONE (OUTPATIENT)
Dept: FAMILY MEDICINE CLINIC | Facility: CLINIC | Age: 68
End: 2023-02-28
Payer: OTHER GOVERNMENT

## 2023-02-28 RX ORDER — FLUTICASONE PROPIONATE 50 MCG
2 SPRAY, SUSPENSION (ML) NASAL DAILY
Qty: 16 G | Refills: 5 | Status: SHIPPED | OUTPATIENT
Start: 2023-02-28

## 2023-02-28 NOTE — TELEPHONE ENCOUNTER
Called patient and let him know that flonase was sent to [facility].

## 2023-03-27 ENCOUNTER — OFFICE VISIT (OUTPATIENT)
Dept: FAMILY MEDICINE CLINIC | Facility: CLINIC | Age: 68
End: 2023-03-27
Payer: MEDICARE

## 2023-03-27 VITALS
HEART RATE: 66 BPM | OXYGEN SATURATION: 96 % | BODY MASS INDEX: 37.85 KG/M2 | WEIGHT: 264.4 LBS | DIASTOLIC BLOOD PRESSURE: 64 MMHG | TEMPERATURE: 98.2 F | SYSTOLIC BLOOD PRESSURE: 114 MMHG | HEIGHT: 70 IN

## 2023-03-27 DIAGNOSIS — N39.0 URINARY TRACT INFECTION WITHOUT HEMATURIA, SITE UNSPECIFIED: ICD-10-CM

## 2023-03-27 DIAGNOSIS — R30.0 DYSURIA: Primary | ICD-10-CM

## 2023-03-27 LAB
BILIRUB BLD-MCNC: NEGATIVE MG/DL
CLARITY, POC: CLEAR
COLOR UR: YELLOW
GLUCOSE UR STRIP-MCNC: NEGATIVE MG/DL
KETONES UR QL: NEGATIVE
LEUKOCYTE EST, POC: ABNORMAL
NITRITE UR-MCNC: POSITIVE MG/ML
PH UR: 6.5 [PH] (ref 5–8)
PROT UR STRIP-MCNC: NEGATIVE MG/DL
RBC # UR STRIP: NEGATIVE /UL
SP GR UR: 1.02 (ref 1–1.03)
UROBILINOGEN UR QL: ABNORMAL

## 2023-03-27 PROCEDURE — 81002 URINALYSIS NONAUTO W/O SCOPE: CPT | Performed by: FAMILY MEDICINE

## 2023-03-27 PROCEDURE — 3074F SYST BP LT 130 MM HG: CPT | Performed by: FAMILY MEDICINE

## 2023-03-27 PROCEDURE — 87086 URINE CULTURE/COLONY COUNT: CPT | Performed by: FAMILY MEDICINE

## 2023-03-27 PROCEDURE — 3078F DIAST BP <80 MM HG: CPT | Performed by: FAMILY MEDICINE

## 2023-03-27 PROCEDURE — 99213 OFFICE O/P EST LOW 20 MIN: CPT | Performed by: FAMILY MEDICINE

## 2023-03-27 RX ORDER — NITROFURANTOIN 25; 75 MG/1; MG/1
100 CAPSULE ORAL 2 TIMES DAILY
Qty: 14 CAPSULE | Refills: 0 | Status: SHIPPED | OUTPATIENT
Start: 2023-03-27 | End: 2023-04-03

## 2023-03-27 RX ORDER — HYDROXYCHLOROQUINE SULFATE 200 MG/1
TABLET, FILM COATED ORAL
COMMUNITY
Start: 2023-03-18

## 2023-03-27 NOTE — PROGRESS NOTES
"Chief Complaint  Blood in urine    Subjective        Facundo Hinds presents to [facility]  History of Present Illness  Patient presents today complaining of blood in his urine.  He noticed this at 2:00 yesterday and then it stopped yesterday evening around 11:00.  The past 3 times he has voided there has not been any blood.  He does have some cyst that are being monitored on his left kidney by Dr. Berhane Crowe.  His last MRI was done back in July 2022 showing complex masses along the posterior interpolar region of the lower pole left kidney containing various stages of internal hemorrhage and/or proteinaceous debris.  He has had his right kidney removed due to renal cancer back in 2004.  He is not having any more burning with urination now.  He did have a urine dip done today in office.  This showed the specific gravity was elevated at 1.025 with leukocytes and nitrites present.  Objective   Vital Signs:  /64   Pulse 66   Temp 98.2 °F (36.8 °C)   Ht 177.8 cm (70\")   Wt 120 kg (264 lb 6.4 oz)   SpO2 96%   BMI 37.94 kg/m²   Estimated body mass index is 37.94 kg/m² as calculated from the following:    Height as of this encounter: 177.8 cm (70\").    Weight as of this encounter: 120 kg (264 lb 6.4 oz).             Physical Exam   General: AAO ×3, no acute distress, pleasant  HEENT: Normocephalic, atraumatic  Cardiovascular: Regular rate and rhythm without appreciable murmur  Respiratory: Clear to auscultation bilaterally no RRW  Gastrointestinal: Soft nontender nondistended with bowel sounds present  extremities: No edema  Neurologic: CN II through XII grossly intact   Psychiatric: Normal mood and affect  Result Review :                   Assessment and Plan   Diagnoses and all orders for this visit:    1. Dysuria (Primary)  -     Urine Culture - Urine, Urine, Clean Catch  -     POCT urinalysis dipstick, manual    2. Urinary tract infection without hematuria, site " unspecified    Other orders  -     nitrofurantoin, macrocrystal-monohydrate, (Macrobid) 100 MG capsule; Take 1 capsule by mouth 2 (Two) Times a Day for 7 days.  Dispense: 14 capsule; Refill: 0      Unclear source of blood in the urine.  It could be that he had some proteinaceous debris/blood from one of his cysts pass.  He is asymptomatic now however he does have what looks like a urinary tract infection.  I will treat as such with Macrobid.  Urine culture has been ordered.  Patient encouraged to stay well-hydrated.       Follow Up   Return if symptoms worsen or fail to improve.  Patient was given instructions and counseling regarding his condition or for health maintenance advice. Please see specific information pulled into the AVS if appropriate.

## 2023-03-28 LAB — BACTERIA SPEC AEROBE CULT: NO GROWTH

## 2023-04-12 ENCOUNTER — OFFICE VISIT (OUTPATIENT)
Dept: FAMILY MEDICINE CLINIC | Facility: CLINIC | Age: 68
End: 2023-04-12
Payer: MEDICARE

## 2023-04-12 VITALS
SYSTOLIC BLOOD PRESSURE: 110 MMHG | TEMPERATURE: 96.9 F | WEIGHT: 260 LBS | HEART RATE: 65 BPM | BODY MASS INDEX: 37.22 KG/M2 | OXYGEN SATURATION: 98 % | HEIGHT: 70 IN | DIASTOLIC BLOOD PRESSURE: 66 MMHG

## 2023-04-12 DIAGNOSIS — N18.31 STAGE 3A CHRONIC KIDNEY DISEASE: ICD-10-CM

## 2023-04-12 DIAGNOSIS — E78.2 MIXED HYPERLIPIDEMIA: ICD-10-CM

## 2023-04-12 DIAGNOSIS — I10 PRIMARY HYPERTENSION: Primary | ICD-10-CM

## 2023-04-12 DIAGNOSIS — Z12.11 SCREEN FOR COLON CANCER: ICD-10-CM

## 2023-04-12 LAB
ALBUMIN SERPL-MCNC: 4.3 G/DL (ref 3.5–5.2)
ALBUMIN/GLOB SERPL: 1.3 G/DL
ALP SERPL-CCNC: 82 U/L (ref 39–117)
ALT SERPL W P-5'-P-CCNC: 31 U/L (ref 1–41)
ANION GAP SERPL CALCULATED.3IONS-SCNC: 10 MMOL/L (ref 5–15)
AST SERPL-CCNC: 32 U/L (ref 1–40)
BACTERIA UR QL AUTO: ABNORMAL /HPF
BASOPHILS # BLD AUTO: 0.06 10*3/MM3 (ref 0–0.2)
BASOPHILS NFR BLD AUTO: 0.9 % (ref 0–1.5)
BILIRUB SERPL-MCNC: 0.3 MG/DL (ref 0–1.2)
BILIRUB UR QL STRIP: NEGATIVE
BUN SERPL-MCNC: 17 MG/DL (ref 8–23)
BUN/CREAT SERPL: 10.7 (ref 7–25)
CALCIUM SPEC-SCNC: 9.1 MG/DL (ref 8.6–10.5)
CHLORIDE SERPL-SCNC: 102 MMOL/L (ref 98–107)
CHOLEST SERPL-MCNC: 131 MG/DL (ref 0–200)
CLARITY UR: CLEAR
CO2 SERPL-SCNC: 27 MMOL/L (ref 22–29)
COLOR UR: YELLOW
CREAT SERPL-MCNC: 1.59 MG/DL (ref 0.76–1.27)
DEPRECATED RDW RBC AUTO: 41.1 FL (ref 37–54)
EGFRCR SERPLBLD CKD-EPI 2021: 47.6 ML/MIN/1.73
EOSINOPHIL # BLD AUTO: 0.2 10*3/MM3 (ref 0–0.4)
EOSINOPHIL NFR BLD AUTO: 3.1 % (ref 0.3–6.2)
ERYTHROCYTE [DISTWIDTH] IN BLOOD BY AUTOMATED COUNT: 12 % (ref 12.3–15.4)
GLOBULIN UR ELPH-MCNC: 3.3 GM/DL
GLUCOSE SERPL-MCNC: 96 MG/DL (ref 65–99)
GLUCOSE UR STRIP-MCNC: NEGATIVE MG/DL
HCT VFR BLD AUTO: 38 % (ref 37.5–51)
HDLC SERPL-MCNC: 69 MG/DL (ref 40–60)
HGB BLD-MCNC: 12.9 G/DL (ref 13–17.7)
HGB UR QL STRIP.AUTO: NEGATIVE
HYALINE CASTS UR QL AUTO: ABNORMAL /LPF
IMM GRANULOCYTES # BLD AUTO: 0.01 10*3/MM3 (ref 0–0.05)
IMM GRANULOCYTES NFR BLD AUTO: 0.2 % (ref 0–0.5)
KETONES UR QL STRIP: NEGATIVE
LDLC SERPL CALC-MCNC: 50 MG/DL (ref 0–100)
LDLC/HDLC SERPL: 0.75 {RATIO}
LEUKOCYTE ESTERASE UR QL STRIP.AUTO: ABNORMAL
LYMPHOCYTES # BLD AUTO: 1.74 10*3/MM3 (ref 0.7–3.1)
LYMPHOCYTES NFR BLD AUTO: 27.3 % (ref 19.6–45.3)
MCH RBC QN AUTO: 31.5 PG (ref 26.6–33)
MCHC RBC AUTO-ENTMCNC: 33.9 G/DL (ref 31.5–35.7)
MCV RBC AUTO: 92.9 FL (ref 79–97)
MONOCYTES # BLD AUTO: 0.87 10*3/MM3 (ref 0.1–0.9)
MONOCYTES NFR BLD AUTO: 13.6 % (ref 5–12)
NEUTROPHILS NFR BLD AUTO: 3.5 10*3/MM3 (ref 1.7–7)
NEUTROPHILS NFR BLD AUTO: 54.9 % (ref 42.7–76)
NITRITE UR QL STRIP: NEGATIVE
NRBC BLD AUTO-RTO: 0 /100 WBC (ref 0–0.2)
PH UR STRIP.AUTO: 6 [PH] (ref 5–8)
PLATELET # BLD AUTO: 208 10*3/MM3 (ref 140–450)
PMV BLD AUTO: 10.5 FL (ref 6–12)
POTASSIUM SERPL-SCNC: 3.9 MMOL/L (ref 3.5–5.2)
PROT SERPL-MCNC: 7.6 G/DL (ref 6–8.5)
PROT UR QL STRIP: ABNORMAL
RBC # BLD AUTO: 4.09 10*6/MM3 (ref 4.14–5.8)
RBC # UR STRIP: ABNORMAL /HPF
REF LAB TEST METHOD: ABNORMAL
SODIUM SERPL-SCNC: 139 MMOL/L (ref 136–145)
SP GR UR STRIP: 1.02 (ref 1–1.03)
SQUAMOUS #/AREA URNS HPF: ABNORMAL /HPF
TRIGL SERPL-MCNC: 51 MG/DL (ref 0–150)
TSH SERPL DL<=0.05 MIU/L-ACNC: 0.88 UIU/ML (ref 0.27–4.2)
UROBILINOGEN UR QL STRIP: ABNORMAL
VLDLC SERPL-MCNC: 12 MG/DL (ref 5–40)
WBC # UR STRIP: ABNORMAL /HPF
WBC NRBC COR # BLD: 6.38 10*3/MM3 (ref 3.4–10.8)

## 2023-04-12 PROCEDURE — 80061 LIPID PANEL: CPT | Performed by: NURSE PRACTITIONER

## 2023-04-12 PROCEDURE — 81001 URINALYSIS AUTO W/SCOPE: CPT | Performed by: NURSE PRACTITIONER

## 2023-04-12 PROCEDURE — 84443 ASSAY THYROID STIM HORMONE: CPT | Performed by: NURSE PRACTITIONER

## 2023-04-12 PROCEDURE — 80053 COMPREHEN METABOLIC PANEL: CPT | Performed by: NURSE PRACTITIONER

## 2023-04-12 PROCEDURE — 85025 COMPLETE CBC W/AUTO DIFF WBC: CPT | Performed by: NURSE PRACTITIONER

## 2023-04-12 NOTE — LETTER
2023    Facundo Hinds  [address]  [facility] [address]      To whom it may concern:    Facundo Hinds  1955, has been a patient at this office with since 2021. He has a past medical history of renal cell carcinoma status post right radical nephrectomy as of . He also has chronic kidney disease 3a based on the lab results from [facility] since . Unsure when the CKD disease started since he had the CKD prior to establishing care at this office. I will attach  lab results show the CKD stage 3a.                    Shailesh Barr APRN

## 2023-04-12 NOTE — PROGRESS NOTES
"Chief Complaint  Hypertension, CKD    Subjective         Facundo Hinds presents to [facility]  HPI   [facility] today for 3-month follow-up on hypertension and chronic kidney disease.  He has a history of right renal cell carcinoma status post radical right nephrectomy.  Couple weeks ago he was seen here in office with hematuria.  He was treated for UTI.  After treatment his hematuria has resolved.  No hematuria since then.  He denies chest pain, syncope, palpitations.  Denies urinary frequency and urgency.  He has a left renal mass that is a complex cyst.  He sees urology.    Social History     Socioeconomic History   • Marital status:    Tobacco Use   • Smoking status: Never     Passive exposure: Yes   • Smokeless tobacco: Never   Vaping Use   • Vaping Use: Never used   Substance and Sexual Activity   • Alcohol use: Never   • Drug use: Never   • Sexual activity: Not Currently     Partners: Female     Birth control/protection: None        Objective     Vitals:    04/12/23 0757   BP: 110/66   Pulse: 65   Temp: 96.9 °F (36.1 °C)   SpO2: 98%   Weight: 118 kg (260 lb)   Height: 177.8 cm (70\")        Body mass index is 37.31 kg/m².    Wt Readings from Last 3 Encounters:   04/12/23 118 kg (260 lb)   03/27/23 120 kg (264 lb 6.4 oz)   01/12/23 121 kg (266 lb 12.8 oz)       BP Readings from Last 3 Encounters:   04/12/23 110/66   03/27/23 114/64   01/12/23 118/64         Physical Exam  Vitals reviewed.   Constitutional:       Appearance: Normal appearance. He is morbidly obese.   HENT:      Head: Normocephalic and atraumatic.      Right Ear: External ear normal.      Left Ear: External ear normal.      Mouth/Throat:      Pharynx: No oropharyngeal exudate.   Eyes:      Conjunctiva/sclera: Conjunctivae normal.      Pupils: Pupils are equal, round, and reactive to light.   Cardiovascular:      Rate and Rhythm: Normal rate and regular rhythm.      Heart sounds: No murmur heard.    No " friction rub. No gallop.   Pulmonary:      Effort: Pulmonary effort is normal.      Breath sounds: Normal breath sounds. No wheezing or rhonchi.   Abdominal:      General: Bowel sounds are normal. There is no distension.      Palpations: Abdomen is soft.      Tenderness: There is no abdominal tenderness.   Skin:     General: Skin is warm and dry.   Neurological:      Mental Status: He is alert and oriented to person, place, and time.   Psychiatric:         Mood and Affect: Mood and affect normal.         Behavior: Behavior normal.         Thought Content: Thought content normal.         Judgment: Judgment normal.          Result Review :   The following data was reviewed by: RASHIDA Pugh on 04/12/2023:  Common labs        11/11/2022    11:19   Common Labs   Glucose 83     BUN 16     Creatinine 1.34     Sodium 136     Potassium 4.4     Chloride 99     Calcium 9.4     Albumin 4.10     Total Bilirubin 0.3     Alkaline Phosphatase 100     AST (SGOT) 27     ALT (SGPT) 29     WBC 7.63     Hemoglobin 12.6     Hematocrit 38.2     Platelets 222     PSA 1.690     Uric Acid 7.5         Procedures    Assessment and Plan   Diagnoses and all orders for this visit:    1. Primary hypertension (Primary)  -     CBC & Differential  -     Comprehensive Metabolic Panel  -     TSH Rfx On Abnormal To Free T4    2. Stage 3a chronic kidney disease  -     CBC & Differential  -     Comprehensive Metabolic Panel  -     Urinalysis With Culture If Indicated -    3. Mixed hyperlipidemia  -     Lipid Panel    4. Screen for colon cancer  -     Ambulatory Referral For Screening Colonoscopy    Will order the following labs for hypertension and chronic kidney disease and hyperlipidemia.  Blood pressures well controlled.  Chronic kidney disease has remained stable.  We will order colonoscopy for colorectal cancer screening.  Last colonoscopy was 5 years ago with Dr. Arce      Class 2 Severe Obesity (BMI >=35 and <=39.9). Obesity-related  health conditions include the following: hypertension and dyslipidemias. Obesity is unchanged. BMI is is above average; BMI management plan is completed. We discussed portion control and increasing exercise.        Follow Up   Return in about 6 months (around 10/12/2023), or if symptoms worsen or fail to improve, for Next scheduled follow up.  Patient was given instructions and counseling regarding his condition or for health maintenance advice. Please see specific information pulled into the AVS if appropriate.     Please note that portions of this note were completed with a voice recognition program.

## 2023-04-19 ENCOUNTER — CLINICAL SUPPORT (OUTPATIENT)
Dept: GASTROENTEROLOGY | Facility: CLINIC | Age: 68
End: 2023-04-19
Payer: MEDICARE

## 2023-04-19 ENCOUNTER — PREP FOR SURGERY (OUTPATIENT)
Dept: OTHER | Facility: HOSPITAL | Age: 68
End: 2023-04-19
Payer: OTHER GOVERNMENT

## 2023-04-19 DIAGNOSIS — N18.31 STAGE 3A CHRONIC KIDNEY DISEASE: Primary | ICD-10-CM

## 2023-04-19 DIAGNOSIS — Z90.5 HISTORY OF NEPHRECTOMY: ICD-10-CM

## 2023-04-19 DIAGNOSIS — Z86.010 HISTORY OF COLON POLYPS: ICD-10-CM

## 2023-04-19 DIAGNOSIS — Z12.11 ENCOUNTER FOR SCREENING FOR MALIGNANT NEOPLASM OF COLON: Primary | ICD-10-CM

## 2023-04-19 PROBLEM — Z86.0100 HISTORY OF COLON POLYPS: Status: ACTIVE | Noted: 2023-04-19

## 2023-04-19 RX ORDER — PEG-3350, SODIUM SULFATE, SODIUM CHLORIDE, POTASSIUM CHLORIDE, SODIUM ASCORBATE AND ASCORBIC ACID 7.5-2.691G
1000 KIT ORAL EVERY 12 HOURS
Qty: 1000 ML | Refills: 0 | Status: SHIPPED | OUTPATIENT
Start: 2023-04-19

## 2023-04-19 NOTE — PROGRESS NOTES
Facundo Hinds  1955  68 y.o.    Reason for call: Recall- 5 yr recall, Hx colon polyps, last colon 2018  Prep prescribed: Moviprep  Prep instructions reviewed with patient and sent to patient via MyChart  Clearance needed? Yes  If yes, what clearance is needed? Blood thinner- Eliquis  Clearance has been requested from - RASHIDA Pugh  The patient has been scheduled for: Colonoscopy  Family history of colon cancer? No  If yes, indicate relative: n/a  Family History   Problem Relation Age of Onset   • Arthritis Mother    • Cancer Brother    • Colon cancer Neg Hx      Past Medical History:   Diagnosis Date   • Arthritis 1997   • Cancer    • Colon polyp    • Deafness    • EKG abnormalities 01/18/2017   • Hemorrhoids    • HTN (hypertension)    • Kidney disorder    • Night sweats    • Sinus trouble    • SOB (shortness of breath)      No Known Allergies  Past Surgical History:   Procedure Laterality Date   • COLONOSCOPY  2004, 2018   • KIDNEY SURGERY     • SHOULDER SURGERY Left 2019   • TESTICLE SURGERY       Social History     Socioeconomic History   • Marital status:    Tobacco Use   • Smoking status: Never     Passive exposure: Yes   • Smokeless tobacco: Never   Vaping Use   • Vaping Use: Never used   Substance and Sexual Activity   • Alcohol use: Never   • Drug use: Never   • Sexual activity: Defer     Partners: Female     Birth control/protection: None       Current Outpatient Medications:   •  acetaminophen (TYLENOL) 500 MG tablet, Take 1 tablet by mouth Every 6 (Six) Hours As Needed for Mild Pain., Disp: , Rfl:   •  apixaban (ELIQUIS) 5 MG tablet tablet, Take 1 tablet by mouth 2 (Two) Times a Day., Disp: 180 tablet, Rfl: 1  •  atorvastatin (Lipitor) 10 MG tablet, Take 1 tablet by mouth Daily., Disp: 90 tablet, Rfl: 1  •  cetirizine (zyrTEC) 10 MG tablet, Take 1 tablet by mouth Daily., Disp: 90 tablet, Rfl: 1  •  fluticasone (Flonase) 50 MCG/ACT nasal spray, 2 sprays into the nostril(s) as  directed by provider Daily., Disp: 16 g, Rfl: 5  •  hydroxychloroquine (PLAQUENIL) 200 MG tablet, , Disp: , Rfl:   •  lisinopril-hydrochlorothiazide (PRINZIDE,ZESTORETIC) 20-25 MG per tablet, Take 1.5 tablets by mouth Daily., Disp: 135 tablet, Rfl: 2  •  multivitamin with minerals tablet tablet, Take 1 tablet by mouth Daily., Disp: , Rfl:   •  predniSONE (DELTASONE) 10 MG tablet, , Disp: , Rfl:   Answers for HPI/ROS submitted by the patient on 4/17/2023  What is the primary reason for your visit?: Other  Please describe your symptoms.: set 5 yr Colonoscopy exam.  Have you had these symptoms before?: No  How long have you been having these symptoms?: Greater than 2 weeks  Please list any medications you are currently taking for this condition.: N/A  Please describe any probable cause for these symptoms. : N/A

## 2023-05-30 RX ORDER — ATORVASTATIN CALCIUM 10 MG/1
10 TABLET, FILM COATED ORAL DAILY
Qty: 90 TABLET | Refills: 1 | Status: SHIPPED | OUTPATIENT
Start: 2023-05-30

## 2023-06-02 RX ORDER — ATORVASTATIN CALCIUM 10 MG/1
10 TABLET, FILM COATED ORAL DAILY
Qty: 90 TABLET | Refills: 1 | OUTPATIENT
Start: 2023-06-02

## 2023-06-12 ENCOUNTER — LAB (OUTPATIENT)
Dept: LAB | Facility: HOSPITAL | Age: 68
End: 2023-06-12
Payer: MEDICARE

## 2023-06-12 ENCOUNTER — TRANSCRIBE ORDERS (OUTPATIENT)
Dept: LAB | Facility: HOSPITAL | Age: 68
End: 2023-06-12
Payer: OTHER GOVERNMENT

## 2023-06-12 DIAGNOSIS — N18.32 STAGE 3B CHRONIC KIDNEY DISEASE: Primary | ICD-10-CM

## 2023-06-12 DIAGNOSIS — N18.32 STAGE 3B CHRONIC KIDNEY DISEASE: ICD-10-CM

## 2023-06-12 DIAGNOSIS — I10 ESSENTIAL (PRIMARY) HYPERTENSION: ICD-10-CM

## 2023-06-12 LAB
25(OH)D3 SERPL-MCNC: 29.2 NG/ML (ref 30–100)
ALBUMIN SERPL-MCNC: 4.2 G/DL (ref 3.5–5.2)
ANION GAP SERPL CALCULATED.3IONS-SCNC: 10.5 MMOL/L (ref 5–15)
BASOPHILS # BLD AUTO: 0.05 10*3/MM3 (ref 0–0.2)
BASOPHILS NFR BLD AUTO: 0.7 % (ref 0–1.5)
BUN SERPL-MCNC: 14 MG/DL (ref 8–23)
BUN/CREAT SERPL: 11.7 (ref 7–25)
CALCIUM SPEC-SCNC: 9.5 MG/DL (ref 8.6–10.5)
CHLORIDE SERPL-SCNC: 101 MMOL/L (ref 98–107)
CO2 SERPL-SCNC: 27.5 MMOL/L (ref 22–29)
CREAT SERPL-MCNC: 1.2 MG/DL (ref 0.76–1.27)
CREAT UR-MCNC: 104.3 MG/DL
DEPRECATED RDW RBC AUTO: 41.8 FL (ref 37–54)
EGFRCR SERPLBLD CKD-EPI 2021: 66.7 ML/MIN/1.73
EOSINOPHIL # BLD AUTO: 0.17 10*3/MM3 (ref 0–0.4)
EOSINOPHIL NFR BLD AUTO: 2.4 % (ref 0.3–6.2)
ERYTHROCYTE [DISTWIDTH] IN BLOOD BY AUTOMATED COUNT: 12.3 % (ref 12.3–15.4)
GLUCOSE SERPL-MCNC: 92 MG/DL (ref 65–99)
HCT VFR BLD AUTO: 40.2 % (ref 37.5–51)
HGB BLD-MCNC: 13.7 G/DL (ref 13–17.7)
IMM GRANULOCYTES # BLD AUTO: 0.03 10*3/MM3 (ref 0–0.05)
IMM GRANULOCYTES NFR BLD AUTO: 0.4 % (ref 0–0.5)
LYMPHOCYTES # BLD AUTO: 1.61 10*3/MM3 (ref 0.7–3.1)
LYMPHOCYTES NFR BLD AUTO: 22.9 % (ref 19.6–45.3)
MCH RBC QN AUTO: 31.6 PG (ref 26.6–33)
MCHC RBC AUTO-ENTMCNC: 34.1 G/DL (ref 31.5–35.7)
MCV RBC AUTO: 92.8 FL (ref 79–97)
MONOCYTES # BLD AUTO: 0.78 10*3/MM3 (ref 0.1–0.9)
MONOCYTES NFR BLD AUTO: 11.1 % (ref 5–12)
NEUTROPHILS NFR BLD AUTO: 4.39 10*3/MM3 (ref 1.7–7)
NEUTROPHILS NFR BLD AUTO: 62.5 % (ref 42.7–76)
NRBC BLD AUTO-RTO: 0 /100 WBC (ref 0–0.2)
PHOSPHATE SERPL-MCNC: 3 MG/DL (ref 2.5–4.5)
PLATELET # BLD AUTO: 208 10*3/MM3 (ref 140–450)
PMV BLD AUTO: 10.8 FL (ref 6–12)
POTASSIUM SERPL-SCNC: 4.3 MMOL/L (ref 3.5–5.2)
PROT ?TM UR-MCNC: 12.9 MG/DL
PROT/CREAT UR: 0.12 MG/G{CREAT}
PTH-INTACT SERPL-MCNC: 37.8 PG/ML (ref 15–65)
RBC # BLD AUTO: 4.33 10*6/MM3 (ref 4.14–5.8)
SODIUM SERPL-SCNC: 139 MMOL/L (ref 136–145)
WBC NRBC COR # BLD: 7.03 10*3/MM3 (ref 3.4–10.8)

## 2023-06-12 PROCEDURE — 84156 ASSAY OF PROTEIN URINE: CPT

## 2023-06-12 PROCEDURE — 82570 ASSAY OF URINE CREATININE: CPT

## 2023-06-12 PROCEDURE — 85025 COMPLETE CBC W/AUTO DIFF WBC: CPT

## 2023-06-12 PROCEDURE — 81001 URINALYSIS AUTO W/SCOPE: CPT

## 2023-06-12 PROCEDURE — 36415 COLL VENOUS BLD VENIPUNCTURE: CPT

## 2023-06-12 PROCEDURE — 82306 VITAMIN D 25 HYDROXY: CPT

## 2023-06-12 PROCEDURE — 80069 RENAL FUNCTION PANEL: CPT

## 2023-06-12 PROCEDURE — 83970 ASSAY OF PARATHORMONE: CPT

## 2023-06-13 LAB
BACTERIA UR QL AUTO: ABNORMAL /HPF
BILIRUB UR QL STRIP: NEGATIVE
CLARITY UR: CLEAR
COLOR UR: YELLOW
GLUCOSE UR STRIP-MCNC: NEGATIVE MG/DL
HGB UR QL STRIP.AUTO: NEGATIVE
HYALINE CASTS UR QL AUTO: ABNORMAL /LPF
KETONES UR QL STRIP: NEGATIVE
LEUKOCYTE ESTERASE UR QL STRIP.AUTO: ABNORMAL
NITRITE UR QL STRIP: NEGATIVE
PH UR STRIP.AUTO: 7 [PH] (ref 5–8)
PROT UR QL STRIP: ABNORMAL
RBC # UR STRIP: ABNORMAL /HPF
REF LAB TEST METHOD: ABNORMAL
SP GR UR STRIP: 1.02 (ref 1–1.03)
SQUAMOUS #/AREA URNS HPF: ABNORMAL /HPF
UROBILINOGEN UR QL STRIP: ABNORMAL
WBC # UR STRIP: ABNORMAL /HPF

## 2023-07-20 NOTE — PAT
Pt called and given arrival time. Instructed to bring photo ID and insurance card. Have  who is over 18 to drive home from procedure. Questions answered about prep for procedure. Do not take any medications that morning except inhalers. Bring medications or updated medication list. Stop blood thinners as instructed by doctor. Come to [facility] and check in.

## 2023-07-26 ENCOUNTER — ANESTHESIA (OUTPATIENT)
Dept: GASTROENTEROLOGY | Facility: HOSPITAL | Age: 68
End: 2023-07-26
Payer: MEDICARE

## 2023-07-26 ENCOUNTER — ANESTHESIA EVENT (OUTPATIENT)
Dept: GASTROENTEROLOGY | Facility: HOSPITAL | Age: 68
End: 2023-07-26
Payer: MEDICARE

## 2023-07-26 ENCOUNTER — HOSPITAL ENCOUNTER (OUTPATIENT)
Facility: HOSPITAL | Age: 68
Setting detail: HOSPITAL OUTPATIENT SURGERY
Discharge: HOME | End: 2023-07-26
Attending: INTERNAL MEDICINE | Admitting: INTERNAL MEDICINE
Payer: MEDICARE

## 2023-07-26 VITALS
WEIGHT: 258.38 LBS | HEART RATE: 65 BPM | HEIGHT: 70 IN | DIASTOLIC BLOOD PRESSURE: 79 MMHG | SYSTOLIC BLOOD PRESSURE: 113 MMHG | RESPIRATION RATE: 14 BRPM | TEMPERATURE: 97.9 F | BODY MASS INDEX: 36.99 KG/M2 | OXYGEN SATURATION: 99 %

## 2023-07-26 DIAGNOSIS — Z12.11 ENCOUNTER FOR SCREENING FOR MALIGNANT NEOPLASM OF COLON: ICD-10-CM

## 2023-07-26 DIAGNOSIS — Z86.010 HISTORY OF COLON POLYPS: ICD-10-CM

## 2023-07-26 PROCEDURE — 45380 COLONOSCOPY AND BIOPSY: CPT | Performed by: INTERNAL MEDICINE

## 2023-07-26 PROCEDURE — 25010000002 PROPOFOL 10 MG/ML EMULSION

## 2023-07-26 PROCEDURE — 45385 COLONOSCOPY W/LESION REMOVAL: CPT | Performed by: INTERNAL MEDICINE

## 2023-07-26 PROCEDURE — 88305 TISSUE EXAM BY PATHOLOGIST: CPT | Performed by: INTERNAL MEDICINE

## 2023-07-26 RX ORDER — PROPOFOL 10 MG/ML
VIAL (ML) INTRAVENOUS AS NEEDED
Status: DISCONTINUED | OUTPATIENT
Start: 2023-07-26 | End: 2023-07-26 | Stop reason: SURG

## 2023-07-26 RX ORDER — SODIUM CHLORIDE, SODIUM LACTATE, POTASSIUM CHLORIDE, CALCIUM CHLORIDE 600; 310; 30; 20 MG/100ML; MG/100ML; MG/100ML; MG/100ML
30 INJECTION, SOLUTION INTRAVENOUS CONTINUOUS
Status: DISCONTINUED | OUTPATIENT
Start: 2023-07-26 | End: 2023-07-26 | Stop reason: HOSPADM

## 2023-07-26 RX ORDER — LIDOCAINE HYDROCHLORIDE 20 MG/ML
INJECTION, SOLUTION EPIDURAL; INFILTRATION; INTRACAUDAL; PERINEURAL AS NEEDED
Status: DISCONTINUED | OUTPATIENT
Start: 2023-07-26 | End: 2023-07-26 | Stop reason: SURG

## 2023-07-26 RX ADMIN — PROPOFOL 100 MG: 10 INJECTION, EMULSION INTRAVENOUS at 08:10

## 2023-07-26 RX ADMIN — LIDOCAINE HYDROCHLORIDE 40 MG: 20 INJECTION, SOLUTION EPIDURAL; INFILTRATION; INTRACAUDAL; PERINEURAL at 08:10

## 2023-07-26 RX ADMIN — PROPOFOL 200 MCG/KG/MIN: 10 INJECTION, EMULSION INTRAVENOUS at 08:10

## 2023-07-26 RX ADMIN — SODIUM CHLORIDE, POTASSIUM CHLORIDE, SODIUM LACTATE AND CALCIUM CHLORIDE 30 ML/HR: 600; 310; 30; 20 INJECTION, SOLUTION INTRAVENOUS at 07:35

## 2023-07-26 NOTE — H&P
ScreeningPre Procedure History & Physical    Chief Complaint:   Screening     Subjective     HPI:   Screening     Past Medical History:   Past Medical History:   Diagnosis Date    Arthritis 1997    Cancer     Colon polyp     Deafness     EKG abnormalities 01/18/2017    Hemorrhoids     HTN (hypertension)     Kidney disorder     Night sweats     Sinus trouble     SOB (shortness of breath)        Past Surgical History:  Past Surgical History:   Procedure Laterality Date    COLONOSCOPY  2004, 2018    KIDNEY SURGERY      SHOULDER SURGERY Left 2019    TESTICLE SURGERY         Family History:  Family History   Problem Relation Age of Onset    Arthritis Mother     Cancer Brother     Colon cancer Neg Hx        Social History:   reports that he has never smoked. He has been exposed to tobacco smoke. He has never used smokeless tobacco. He reports that he does not drink alcohol and does not use drugs.    Medications:   Medications Prior to Admission   Medication Sig Dispense Refill Last Dose    atorvastatin (Lipitor) 10 MG tablet Take 1 tablet by mouth Daily. 90 tablet 1 7/25/2023    cetirizine (zyrTEC) 10 MG tablet Take 1 tablet by mouth Daily. 90 tablet 1 7/25/2023    fluticasone (Flonase) 50 MCG/ACT nasal spray 2 sprays into the nostril(s) as directed by provider Daily. 16 g 5 7/25/2023    hydroxychloroquine (PLAQUENIL) 200 MG tablet    7/25/2023    lisinopril-hydrochlorothiazide (PRINZIDE,ZESTORETIC) 20-25 MG per tablet Take 1.5 tablets by mouth Daily. 135 tablet 2 7/25/2023    acetaminophen (TYLENOL) 500 MG tablet Take 1 tablet by mouth Every 6 (Six) Hours As Needed for Mild Pain.   7/24/2023    apixaban (ELIQUIS) 5 MG tablet tablet Take 1 tablet by mouth 2 (Two) Times a Day. 180 tablet 1 7/23/2023    multivitamin with minerals tablet tablet Take 1 tablet by mouth Daily.   7/23/2023       Allergies:  Patient has no known allergies.        Objective     Blood pressure 112/74, pulse 54, temperature 97.6 °F (36.4 °C),  "temperature source Temporal, resp. rate 18, height 177.8 cm (70\"), weight 117 kg (258 lb 6.1 oz), SpO2 96 %.    Physical Exam   Constitutional: Pt is oriented to person, place, and time and well-developed, well-nourished, and in no distress.   Mouth/Throat: Oropharynx is clear and moist.   Neck: Normal range of motion.   Cardiovascular: Normal rate, regular rhythm and normal heart sounds.    Pulmonary/Chest: Effort normal and breath sounds normal.   Abdominal: Soft. Nontender  Skin: Skin is warm and dry.   Psychiatric: Mood, memory, affect and judgment normal.     Assessment & Plan     Diagnosis:  Screening colonoscopy  H/o colon polyps     Anticipated Surgical Procedure:  colonoscopy    The risks, benefits, and alternatives of this procedure have been discussed with the patient or the responsible party- the patient understands and agrees to proceed.            "

## 2023-07-26 NOTE — ANESTHESIA PREPROCEDURE EVALUATION
Anesthesia Evaluation     Patient summary reviewed and Nursing notes reviewed                Airway   Mallampati: I  TM distance: >3 FB  Neck ROM: full  No difficulty expected  Dental      Pulmonary - normal exam    breath sounds clear to auscultation  (+) ,shortness of breath  Cardiovascular - normal exam    Rhythm: regular  Rate: normal    (+) hypertensionhyperlipidemia      Neuro/Psych- negative ROS  GI/Hepatic/Renal/Endo    (+) obesity, renal disease    Musculoskeletal     Abdominal    Substance History - negative use     OB/GYN negative ob/gyn ROS         Other   arthritis,   history of cancer                  Anesthesia Plan    ASA 2     general     intravenous induction     Anesthetic plan, risks, benefits, and alternatives have been provided, discussed and informed consent has been obtained with: patient.    CODE STATUS:

## 2023-07-26 NOTE — ANESTHESIA POSTPROCEDURE EVALUATION
Patient: Facundo Hinds    Procedure Summary       Date: 07/26/23 Room / Location: [facility] ENDOSCOPY 2 / [facility] ENDOSCOPY    Anesthesia Start: 0806 Anesthesia Stop: 0839    Procedure: COLONOSCOPY WITH POLYPECTOMIES, HOT SNARE Diagnosis:       Encounter for screening for malignant neoplasm of colon      History of colon polyps      (Encounter for screening for malignant neoplasm of colon [Z12.11])      (History of colon polyps [Z86.010])    Surgeons: Edward Arce MD Provider: Guilherme Dupont MD    Anesthesia Type: general ASA Status: 2            Anesthesia Type: general    Vitals  Vitals Value Taken Time   /71 07/26/23 0848   Temp 36.6 °C (97.8 °F) 07/26/23 0838   Pulse 61 07/26/23 0848   Resp 21 07/26/23 0848   SpO2 100 % 07/26/23 0848           Post Anesthesia Care and Evaluation    Patient location during evaluation: bedside  Patient participation: complete - patient participated  Level of consciousness: awake  Pain management: adequate    Airway patency: patent  PONV Status: none  Cardiovascular status: acceptable  Respiratory status: acceptable  Hydration status: acceptable    Comments: An Anesthesiologist personally participated in the most demanding procedures (including induction and emergence if applicable) in the anesthesia plan, monitored the course of anesthesia administration at frequent intervals and remained physically present and available for immediate diagnosis and treatment of emergencies.

## 2023-07-28 DIAGNOSIS — I10 PRIMARY HYPERTENSION: ICD-10-CM

## 2023-07-28 LAB
CYTO UR: NORMAL
LAB AP CASE REPORT: NORMAL
LAB AP CLINICAL INFORMATION: NORMAL
PATH REPORT.FINAL DX SPEC: NORMAL
PATH REPORT.GROSS SPEC: NORMAL

## 2023-07-28 RX ORDER — LISINOPRIL AND HYDROCHLOROTHIAZIDE 25; 20 MG/1; MG/1
1.5 TABLET ORAL DAILY
Qty: 135 TABLET | Refills: 2 | Status: SHIPPED | OUTPATIENT
Start: 2023-07-28

## 2023-09-22 NOTE — TELEPHONE ENCOUNTER
Caller: Mariam Hindse    Relationship: Self    Best call back number: [phone] (Home)     Requested Prescriptions:   Requested Prescriptions     Pending Prescriptions Disp Refills    apixaban (ELIQUIS) 5 MG tablet tablet 180 tablet 1     Sig: Take 1 tablet by mouth 2 (Two) Times a Day.        Pharmacy where request should be sent: [place] [phone] [place] [phone]      Last office visit with prescribing clinician: 4/12/2023   Last telemedicine visit with prescribing clinician: Visit date not found   Next office visit with prescribing clinician: Visit date not found     Additional details provided by patient: PATIENT HAS MORE THAN A 3 DAY SUPPLY    Does the patient have less than a 3 day supply:  [] Yes  [x] No    Would you like a call back once the refill request has been completed: [x] Yes [] No    If the office needs to give you a call back, can they leave a voicemail: [x] Yes [] No    Zaina Moore Rep   09/22/23 08:49 EDT

## 2023-10-17 ENCOUNTER — OFFICE VISIT (OUTPATIENT)
Dept: FAMILY MEDICINE CLINIC | Facility: CLINIC | Age: 68
End: 2023-10-17
Payer: MEDICARE

## 2023-10-17 VITALS
DIASTOLIC BLOOD PRESSURE: 78 MMHG | SYSTOLIC BLOOD PRESSURE: 116 MMHG | HEART RATE: 64 BPM | TEMPERATURE: 97.7 F | WEIGHT: 260.9 LBS | HEIGHT: 70 IN | OXYGEN SATURATION: 97 % | BODY MASS INDEX: 37.35 KG/M2

## 2023-10-17 DIAGNOSIS — Z12.5 SCREENING FOR PROSTATE CANCER: ICD-10-CM

## 2023-10-17 DIAGNOSIS — Z90.5 HISTORY OF NEPHRECTOMY: ICD-10-CM

## 2023-10-17 DIAGNOSIS — N18.32 STAGE 3B CHRONIC KIDNEY DISEASE: ICD-10-CM

## 2023-10-17 DIAGNOSIS — Z23 NEED FOR TDAP VACCINATION: ICD-10-CM

## 2023-10-17 DIAGNOSIS — J30.9 ALLERGIC RHINITIS, UNSPECIFIED SEASONALITY, UNSPECIFIED TRIGGER: ICD-10-CM

## 2023-10-17 DIAGNOSIS — E78.2 MIXED HYPERLIPIDEMIA: ICD-10-CM

## 2023-10-17 DIAGNOSIS — Z86.718 HISTORY OF DVT (DEEP VEIN THROMBOSIS): ICD-10-CM

## 2023-10-17 DIAGNOSIS — Z51.81 MEDICATION MONITORING ENCOUNTER: ICD-10-CM

## 2023-10-17 DIAGNOSIS — R73.09 ABNORMAL GLUCOSE: ICD-10-CM

## 2023-10-17 DIAGNOSIS — M05.9 RHEUMATOID ARTHRITIS WITH POSITIVE RHEUMATOID FACTOR, INVOLVING UNSPECIFIED SITE: ICD-10-CM

## 2023-10-17 DIAGNOSIS — D64.9 ANEMIA, UNSPECIFIED TYPE: ICD-10-CM

## 2023-10-17 DIAGNOSIS — Z85.528 HISTORY OF RENAL CELL CANCER: ICD-10-CM

## 2023-10-17 DIAGNOSIS — D35.02 ADRENAL ADENOMA, LEFT: ICD-10-CM

## 2023-10-17 DIAGNOSIS — N28.1 RENAL CYST, LEFT: ICD-10-CM

## 2023-10-17 DIAGNOSIS — I10 ESSENTIAL HYPERTENSION: Primary | ICD-10-CM

## 2023-10-17 DIAGNOSIS — Z86.711 HISTORY OF PULMONARY EMBOLISM: ICD-10-CM

## 2023-10-17 DIAGNOSIS — E55.9 VITAMIN D DEFICIENCY: ICD-10-CM

## 2023-10-17 PROBLEM — Z12.11 SCREENING FOR COLON CANCER: Status: ACTIVE | Noted: 2023-10-17

## 2023-10-17 LAB
25(OH)D3 SERPL-MCNC: 29.9 NG/ML (ref 30–100)
ALBUMIN SERPL-MCNC: 4.1 G/DL (ref 3.5–5.2)
ALBUMIN/GLOB SERPL: 1.2 G/DL
ALP SERPL-CCNC: 85 U/L (ref 39–117)
ALT SERPL W P-5'-P-CCNC: 22 U/L (ref 1–41)
ANION GAP SERPL CALCULATED.3IONS-SCNC: 9.6 MMOL/L (ref 5–15)
AST SERPL-CCNC: 19 U/L (ref 1–40)
BASOPHILS # BLD AUTO: 0.04 10*3/MM3 (ref 0–0.2)
BASOPHILS NFR BLD AUTO: 0.6 % (ref 0–1.5)
BILIRUB SERPL-MCNC: 0.4 MG/DL (ref 0–1.2)
BUN SERPL-MCNC: 14 MG/DL (ref 8–23)
BUN/CREAT SERPL: 11.3 (ref 7–25)
CALCIUM SPEC-SCNC: 9.7 MG/DL (ref 8.6–10.5)
CHLORIDE SERPL-SCNC: 100 MMOL/L (ref 98–107)
CHOLEST SERPL-MCNC: 153 MG/DL (ref 0–200)
CO2 SERPL-SCNC: 27.4 MMOL/L (ref 22–29)
CREAT SERPL-MCNC: 1.24 MG/DL (ref 0.76–1.27)
DEPRECATED RDW RBC AUTO: 41.7 FL (ref 37–54)
EGFRCR SERPLBLD CKD-EPI 2021: 64.1 ML/MIN/1.73
EOSINOPHIL # BLD AUTO: 0.17 10*3/MM3 (ref 0–0.4)
EOSINOPHIL NFR BLD AUTO: 2.5 % (ref 0.3–6.2)
ERYTHROCYTE [DISTWIDTH] IN BLOOD BY AUTOMATED COUNT: 12.2 % (ref 12.3–15.4)
FOLATE SERPL-MCNC: >20 NG/ML (ref 4.78–24.2)
GLOBULIN UR ELPH-MCNC: 3.4 GM/DL
GLUCOSE SERPL-MCNC: 98 MG/DL (ref 65–99)
HBA1C MFR BLD: 6 % (ref 4.8–5.6)
HCT VFR BLD AUTO: 40.7 % (ref 37.5–51)
HDLC SERPL-MCNC: 73 MG/DL (ref 40–60)
HGB BLD-MCNC: 13.9 G/DL (ref 13–17.7)
IMM GRANULOCYTES # BLD AUTO: 0.02 10*3/MM3 (ref 0–0.05)
IMM GRANULOCYTES NFR BLD AUTO: 0.3 % (ref 0–0.5)
IRON 24H UR-MRATE: 92 MCG/DL (ref 59–158)
IRON SATN MFR SERPL: 28 % (ref 20–50)
LDLC SERPL CALC-MCNC: 65 MG/DL (ref 0–100)
LDLC/HDLC SERPL: 0.87 {RATIO}
LYMPHOCYTES # BLD AUTO: 1.71 10*3/MM3 (ref 0.7–3.1)
LYMPHOCYTES NFR BLD AUTO: 24.7 % (ref 19.6–45.3)
MCH RBC QN AUTO: 32.2 PG (ref 26.6–33)
MCHC RBC AUTO-ENTMCNC: 34.2 G/DL (ref 31.5–35.7)
MCV RBC AUTO: 94.2 FL (ref 79–97)
MONOCYTES # BLD AUTO: 0.81 10*3/MM3 (ref 0.1–0.9)
MONOCYTES NFR BLD AUTO: 11.7 % (ref 5–12)
NEUTROPHILS NFR BLD AUTO: 4.16 10*3/MM3 (ref 1.7–7)
NEUTROPHILS NFR BLD AUTO: 60.2 % (ref 42.7–76)
NRBC BLD AUTO-RTO: 0 /100 WBC (ref 0–0.2)
PLATELET # BLD AUTO: 221 10*3/MM3 (ref 140–450)
PMV BLD AUTO: 10.5 FL (ref 6–12)
POTASSIUM SERPL-SCNC: 4 MMOL/L (ref 3.5–5.2)
PROT SERPL-MCNC: 7.5 G/DL (ref 6–8.5)
PSA SERPL-MCNC: 1.01 NG/ML (ref 0–4)
RBC # BLD AUTO: 4.32 10*6/MM3 (ref 4.14–5.8)
SODIUM SERPL-SCNC: 137 MMOL/L (ref 136–145)
TIBC SERPL-MCNC: 329 MCG/DL (ref 298–536)
TRANSFERRIN SERPL-MCNC: 221 MG/DL (ref 200–360)
TRIGL SERPL-MCNC: 81 MG/DL (ref 0–150)
VIT B12 BLD-MCNC: 611 PG/ML (ref 211–946)
VLDLC SERPL-MCNC: 15 MG/DL (ref 5–40)
WBC NRBC COR # BLD: 6.91 10*3/MM3 (ref 3.4–10.8)

## 2023-10-17 PROCEDURE — 85025 COMPLETE CBC W/AUTO DIFF WBC: CPT | Performed by: FAMILY MEDICINE

## 2023-10-17 PROCEDURE — G0103 PSA SCREENING: HCPCS | Performed by: FAMILY MEDICINE

## 2023-10-17 PROCEDURE — 82746 ASSAY OF FOLIC ACID SERUM: CPT | Performed by: FAMILY MEDICINE

## 2023-10-17 PROCEDURE — 80053 COMPREHEN METABOLIC PANEL: CPT | Performed by: FAMILY MEDICINE

## 2023-10-17 PROCEDURE — 80061 LIPID PANEL: CPT | Performed by: FAMILY MEDICINE

## 2023-10-17 PROCEDURE — 82306 VITAMIN D 25 HYDROXY: CPT | Performed by: FAMILY MEDICINE

## 2023-10-17 PROCEDURE — 84466 ASSAY OF TRANSFERRIN: CPT | Performed by: FAMILY MEDICINE

## 2023-10-17 PROCEDURE — 83036 HEMOGLOBIN GLYCOSYLATED A1C: CPT | Performed by: FAMILY MEDICINE

## 2023-10-17 PROCEDURE — 83540 ASSAY OF IRON: CPT | Performed by: FAMILY MEDICINE

## 2023-10-17 PROCEDURE — 82607 VITAMIN B-12: CPT | Performed by: FAMILY MEDICINE

## 2023-10-17 NOTE — PROGRESS NOTES
"Chief Complaint  Establish Care ([facility] from [place] /) and Anemia (PT requesting lab work )    Subjective        Facundo Hinds presents to [facility]  Anemia      Patient presents today as a transfer care from RAHSIDA Conway to myself.  We discussed his medical history and diagnoses today.  This includes previous pulmonary embolism as well as DVT on the left leg.  He does take Eliquis 5 mg twice a day.  He reports that he was diagnosed with a DVT and PE back in 2019/2020.  He is taking Zyrtec for allergies.  He takes atorvastatin for hyperlipidemia.  For hypertension he takes lisinopril/hydrochlorothiazide 20/25 mg where he takes 1-1/2 tablets daily.  He reports his blood pressure has been adequately controlled on this regimen.  When he only took 1 tablet it was not enough and when he took 2 tablets it was too much.  We discussed continuing current management at this time.  He previously has had a nephrectomy on the right for renal cell cancer.  He does have chronic kidney disease stage IIIb and is being followed by nephrology locally with Dr. Osborne.  Patient had a recent MRI of the abdomen with and without contrast to follow renal cysts on the left kidney which have been stable per report.  He also has a stable left adrenal adenoma.  He is seen by rheumatology as well however he is not currently receiving any medication as he was concerned about side effects including affecting the kidney.  He is being followed by Dr. Melquiades Emmanuel.  We did discuss getting labs drawn today.  He reports he has had some issues with anemia previously which goes back several years.  I did review his previous CBC from June which showed a normal hemoglobin but there have been some fluctuations between anemia and normal hemoglobin levels.  He is due for PSA level to be checked as well.  Objective   Vital Signs:  /78   Pulse 64   Temp 97.7 °F (36.5 °C)   Ht 177.8 cm (70\")   Wt 118 kg (260 " "lb 14.4 oz)   SpO2 97%   BMI 37.44 kg/m²   Estimated body mass index is 37.44 kg/m² as calculated from the following:    Height as of this encounter: 177.8 cm (70\").    Weight as of this encounter: 118 kg (260 lb 14.4 oz).               Physical Exam  Vitals reviewed.   Constitutional:       Appearance: Normal appearance.   HENT:      Head: Normocephalic and atraumatic.      Right Ear: External ear normal.      Left Ear: External ear normal.      Mouth/Throat:      Pharynx: No oropharyngeal exudate.   Eyes:      Conjunctiva/sclera: Conjunctivae normal.   Cardiovascular:      Rate and Rhythm: Normal rate and regular rhythm.      Heart sounds: No murmur heard.     No friction rub. No gallop.   Pulmonary:      Effort: Pulmonary effort is normal.      Breath sounds: Normal breath sounds. No wheezing or rhonchi.   Abdominal:      General: Bowel sounds are normal. There is no distension.      Palpations: Abdomen is soft.      Tenderness: There is no abdominal tenderness.   Skin:     General: Skin is warm and dry.   Neurological:      Mental Status: He is alert and oriented to person, place, and time.      Cranial Nerves: No cranial nerve deficit.   Psychiatric:         Mood and Affect: Mood and affect normal.         Behavior: Behavior normal.         Thought Content: Thought content normal.         Judgment: Judgment normal.        Result Review :                   Assessment and Plan   Diagnoses and all orders for this visit:    1. Essential hypertension (Primary)  -     CBC & Differential  -     Comprehensive Metabolic Panel    2. History of pulmonary embolism    3. History of DVT (deep vein thrombosis), left leg    4. Allergic rhinitis, unspecified seasonality, unspecified trigger    5. Mixed hyperlipidemia  -     Lipid Panel    6. Anemia, unspecified type  -     Vitamin B12  -     Iron Profile  -     Folate    7. Need for Tdap vaccination  -     Tdap Vaccine => 6yo IM (BOOSTRIX)    8. History of nephrectomy, " right    9. History of renal cell cancer    10. Stage 3b chronic kidney disease    11. Renal cyst, left    12. Adrenal adenoma, left    13. Rheumatoid arthritis with positive rheumatoid factor, involving unspecified site    14. Vitamin D deficiency  -     Vitamin D,25-Hydroxy    15. Screening for prostate cancer  -     PSA Screen    16. Abnormal glucose  -     Hemoglobin A1c    17. Medication monitoring encounter  -     Vitamin D,25-Hydroxy  -     CBC & Differential  -     Comprehensive Metabolic Panel  -     Vitamin B12  -     Iron Profile  -     Folate  -     PSA Screen  -     Lipid Panel  -     Hemoglobin A1c    Plan to give a Tdap vaccination is he is due today.  I did discuss with him getting further labs today.  Further recommendations to follow once results return.  We discussed continuing current management of his chronic medical conditions.  I will see him back in 3 months or sooner if needed.  Patient instructed to call with any questions or concerns.         Follow Up   Return in about 3 months (around 1/17/2024) for hypertension.  Patient was given instructions and counseling regarding his condition or for health maintenance advice. Please see specific information pulled into the AVS if appropriate.

## 2023-12-18 RX ORDER — ATORVASTATIN CALCIUM 10 MG/1
10 TABLET, FILM COATED ORAL DAILY
Qty: 90 TABLET | Refills: 1 | Status: SHIPPED | OUTPATIENT
Start: 2023-12-18

## 2023-12-18 NOTE — TELEPHONE ENCOUNTER
Caller: Facundo Hinds    Relationship: Self    Best call back number: [phone]     Requested Prescriptions:   Requested Prescriptions     Pending Prescriptions Disp Refills    atorvastatin (Lipitor) 10 MG tablet 90 tablet 1     Sig: Take 1 tablet by mouth Daily.        Pharmacy where request should be sent: [address] [phone] [facility] [phone]      Last office visit with prescribing clinician: 10/17/2023   Last telemedicine visit with prescribing clinician: Visit date not found   Next office visit with prescribing clinician: 1/23/2024     Does the patient have less than a 3 day supply:  [] Yes  [x] No    Would you like a call back once the refill request has been completed: [] Yes [x] No    If the office needs to give you a call back, can they leave a voicemail: [] Yes [x] No    Zaina Zayas Rep   12/18/23 08:48 EST

## 2023-12-28 ENCOUNTER — TRANSCRIBE ORDERS (OUTPATIENT)
Dept: ADMINISTRATIVE | Facility: HOSPITAL | Age: 68
End: 2023-12-28
Payer: OTHER GOVERNMENT

## 2023-12-28 ENCOUNTER — LAB (OUTPATIENT)
Dept: LAB | Facility: HOSPITAL | Age: 68
End: 2023-12-28
Payer: MEDICARE

## 2023-12-28 DIAGNOSIS — N18.32 CHRONIC KIDNEY DISEASE (CKD) STAGE G3B/A1, MODERATELY DECREASED GLOMERULAR FILTRATION RATE (GFR) BETWEEN 30-44 ML/MIN/1.73 SQUARE METER AND ALBUMINURIA CREATININE RATIO LESS THAN 30 MG/G (CMS/H*: ICD-10-CM

## 2023-12-28 DIAGNOSIS — N18.32 CHRONIC KIDNEY DISEASE (CKD) STAGE G3B/A1, MODERATELY DECREASED GLOMERULAR FILTRATION RATE (GFR) BETWEEN 30-44 ML/MIN/1.73 SQUARE METER AND ALBUMINURIA CREATININE RATIO LESS THAN 30 MG/G (CMS/H*: Primary | ICD-10-CM

## 2023-12-28 DIAGNOSIS — I10 HYPERTENSION, UNSPECIFIED TYPE: ICD-10-CM

## 2023-12-28 LAB
25(OH)D3 SERPL-MCNC: 26.7 NG/ML (ref 30–100)
ALBUMIN SERPL-MCNC: 4.1 G/DL (ref 3.5–5.2)
AMORPH URATE CRY URNS QL MICRO: ABNORMAL /HPF
ANION GAP SERPL CALCULATED.3IONS-SCNC: 10.5 MMOL/L (ref 5–15)
BACTERIA UR QL AUTO: ABNORMAL /HPF
BASOPHILS # BLD AUTO: 0.05 10*3/MM3 (ref 0–0.2)
BASOPHILS NFR BLD AUTO: 0.8 % (ref 0–1.5)
BILIRUB UR QL STRIP: NEGATIVE
BUN SERPL-MCNC: 14 MG/DL (ref 8–23)
BUN/CREAT SERPL: 10.4 (ref 7–25)
CALCIUM SPEC-SCNC: 9.3 MG/DL (ref 8.6–10.5)
CHLORIDE SERPL-SCNC: 101 MMOL/L (ref 98–107)
CLARITY UR: ABNORMAL
CO2 SERPL-SCNC: 27.5 MMOL/L (ref 22–29)
COLOR UR: YELLOW
CREAT SERPL-MCNC: 1.35 MG/DL (ref 0.76–1.27)
CREAT UR-MCNC: 134.7 MG/DL
DEPRECATED RDW RBC AUTO: 43.1 FL (ref 37–54)
EGFRCR SERPLBLD CKD-EPI 2021: 57.9 ML/MIN/1.73
EOSINOPHIL # BLD AUTO: 0.24 10*3/MM3 (ref 0–0.4)
EOSINOPHIL NFR BLD AUTO: 3.7 % (ref 0.3–6.2)
ERYTHROCYTE [DISTWIDTH] IN BLOOD BY AUTOMATED COUNT: 12.1 % (ref 12.3–15.4)
GLUCOSE SERPL-MCNC: 108 MG/DL (ref 65–99)
GLUCOSE UR STRIP-MCNC: NEGATIVE MG/DL
HCT VFR BLD AUTO: 38.2 % (ref 37.5–51)
HGB BLD-MCNC: 12.8 G/DL (ref 13–17.7)
HGB UR QL STRIP.AUTO: NEGATIVE
HYALINE CASTS UR QL AUTO: ABNORMAL /LPF
IMM GRANULOCYTES # BLD AUTO: 0.03 10*3/MM3 (ref 0–0.05)
IMM GRANULOCYTES NFR BLD AUTO: 0.5 % (ref 0–0.5)
KETONES UR QL STRIP: NEGATIVE
LEUKOCYTE ESTERASE UR QL STRIP.AUTO: ABNORMAL
LYMPHOCYTES # BLD AUTO: 1.69 10*3/MM3 (ref 0.7–3.1)
LYMPHOCYTES NFR BLD AUTO: 25.8 % (ref 19.6–45.3)
MCH RBC QN AUTO: 32.3 PG (ref 26.6–33)
MCHC RBC AUTO-ENTMCNC: 33.5 G/DL (ref 31.5–35.7)
MCV RBC AUTO: 96.5 FL (ref 79–97)
MONOCYTES # BLD AUTO: 0.64 10*3/MM3 (ref 0.1–0.9)
MONOCYTES NFR BLD AUTO: 9.8 % (ref 5–12)
NEUTROPHILS NFR BLD AUTO: 3.89 10*3/MM3 (ref 1.7–7)
NEUTROPHILS NFR BLD AUTO: 59.4 % (ref 42.7–76)
NITRITE UR QL STRIP: NEGATIVE
NRBC BLD AUTO-RTO: 0 /100 WBC (ref 0–0.2)
PH UR STRIP.AUTO: 7 [PH] (ref 5–8)
PHOSPHATE SERPL-MCNC: 3.1 MG/DL (ref 2.5–4.5)
PLATELET # BLD AUTO: 214 10*3/MM3 (ref 140–450)
PMV BLD AUTO: 10.5 FL (ref 6–12)
POTASSIUM SERPL-SCNC: 4 MMOL/L (ref 3.5–5.2)
PROT ?TM UR-MCNC: 8.9 MG/DL
PROT UR QL STRIP: NEGATIVE
PROT/CREAT UR: 0.07 MG/G{CREAT}
PTH-INTACT SERPL-MCNC: 36.6 PG/ML (ref 15–65)
RBC # BLD AUTO: 3.96 10*6/MM3 (ref 4.14–5.8)
RBC # UR STRIP: ABNORMAL /HPF
REF LAB TEST METHOD: ABNORMAL
SODIUM SERPL-SCNC: 139 MMOL/L (ref 136–145)
SP GR UR STRIP: 1.02 (ref 1–1.03)
SQUAMOUS #/AREA URNS HPF: ABNORMAL /HPF
UROBILINOGEN UR QL STRIP: ABNORMAL
WBC # UR STRIP: ABNORMAL /HPF
WBC NRBC COR # BLD AUTO: 6.54 10*3/MM3 (ref 3.4–10.8)

## 2023-12-28 PROCEDURE — 82306 VITAMIN D 25 HYDROXY: CPT

## 2023-12-28 PROCEDURE — 84156 ASSAY OF PROTEIN URINE: CPT

## 2023-12-28 PROCEDURE — 81001 URINALYSIS AUTO W/SCOPE: CPT

## 2023-12-28 PROCEDURE — 80069 RENAL FUNCTION PANEL: CPT

## 2023-12-28 PROCEDURE — 36415 COLL VENOUS BLD VENIPUNCTURE: CPT

## 2023-12-28 PROCEDURE — 82570 ASSAY OF URINE CREATININE: CPT

## 2023-12-28 PROCEDURE — 85025 COMPLETE CBC W/AUTO DIFF WBC: CPT

## 2023-12-28 PROCEDURE — 83970 ASSAY OF PARATHORMONE: CPT

## 2024-01-23 ENCOUNTER — OFFICE VISIT (OUTPATIENT)
Dept: FAMILY MEDICINE CLINIC | Facility: CLINIC | Age: 69
End: 2024-01-23
Payer: MEDICARE

## 2024-01-23 VITALS
TEMPERATURE: 98.1 F | BODY MASS INDEX: 37.94 KG/M2 | RESPIRATION RATE: 18 BRPM | HEART RATE: 61 BPM | OXYGEN SATURATION: 98 % | DIASTOLIC BLOOD PRESSURE: 80 MMHG | HEIGHT: 70 IN | WEIGHT: 265 LBS | SYSTOLIC BLOOD PRESSURE: 130 MMHG

## 2024-01-23 DIAGNOSIS — Z85.528 HISTORY OF RENAL CELL CANCER: ICD-10-CM

## 2024-01-23 DIAGNOSIS — I10 PRIMARY HYPERTENSION: Primary | ICD-10-CM

## 2024-01-23 DIAGNOSIS — M25.562 CHRONIC PAIN OF LEFT KNEE: ICD-10-CM

## 2024-01-23 DIAGNOSIS — E78.2 MIXED HYPERLIPIDEMIA: ICD-10-CM

## 2024-01-23 DIAGNOSIS — Z86.718 HISTORY OF DVT (DEEP VEIN THROMBOSIS): ICD-10-CM

## 2024-01-23 DIAGNOSIS — Z86.711 HISTORY OF PULMONARY EMBOLISM: ICD-10-CM

## 2024-01-23 DIAGNOSIS — D64.9 ANEMIA, UNSPECIFIED TYPE: ICD-10-CM

## 2024-01-23 DIAGNOSIS — Z12.5 SCREENING FOR PROSTATE CANCER: ICD-10-CM

## 2024-01-23 DIAGNOSIS — J30.9 ALLERGIC RHINITIS, UNSPECIFIED SEASONALITY, UNSPECIFIED TRIGGER: ICD-10-CM

## 2024-01-23 DIAGNOSIS — Z90.5 HISTORY OF NEPHRECTOMY: ICD-10-CM

## 2024-01-23 DIAGNOSIS — E55.9 VITAMIN D DEFICIENCY: ICD-10-CM

## 2024-01-23 DIAGNOSIS — R73.03 PREDIABETES: ICD-10-CM

## 2024-01-23 DIAGNOSIS — G89.29 CHRONIC PAIN OF LEFT KNEE: ICD-10-CM

## 2024-01-23 DIAGNOSIS — N18.32 STAGE 3B CHRONIC KIDNEY DISEASE: ICD-10-CM

## 2024-01-23 RX ORDER — LISINOPRIL AND HYDROCHLOROTHIAZIDE 25; 20 MG/1; MG/1
1.5 TABLET ORAL DAILY
Qty: 135 TABLET | Refills: 3 | Status: SHIPPED | OUTPATIENT
Start: 2024-01-23

## 2024-01-23 RX ORDER — ATORVASTATIN CALCIUM 10 MG/1
10 TABLET, FILM COATED ORAL DAILY
Qty: 90 TABLET | Refills: 3 | Status: SHIPPED | OUTPATIENT
Start: 2024-01-23

## 2024-01-23 NOTE — PROGRESS NOTES
"Chief Complaint  Hypertension      Subjective        Facundo Hinds presents to [facility]  History of Present Illness  Patient presents today to follow-up for hypertension.  Blood pressure has been adequately controlled taking 1/2 tablets of lisinopril/hydrochlorothiazide 20/25 mg daily.  Previously 1 tablet was not enough and 2 tablets was too much.  He is requesting a refill today.  He is also taking atorvastatin 10 mg for hyperlipidemia.  Blood pressure has been adequately controlled.  We discussed continuing current management.  He does continue to take Eliquis given previous DVT in his left leg as well as pulmonary embolism.  Patient previously has had a nephrectomy of his right kidney back in 2001 for renal cell cancer.  He does continue to follow nephrology Saw Betty Santiago recently.  His last PSA was within normal limits.  He is on vitamin D for deficiency now.  We did discuss having labs repeated when he returns for follow-up in 6 months.  He does have chronic left knee pain and reports that his knee is \"shot\".  I did discuss with him and offer getting an x-ray of his left knee but he would like to hold off at this time.  He is interested in being fitted for a brace today though.  Objective   Vital Signs:  /80 (BP Location: Left arm, Patient Position: Sitting, Cuff Size: Adult)   Pulse 61   Temp 98.1 °F (36.7 °C) (Temporal)   Resp 18   Ht 177.8 cm (70\")   Wt 120 kg (265 lb)   SpO2 98%   BMI 38.02 kg/m²   Estimated body mass index is 38.02 kg/m² as calculated from the following:    Height as of this encounter: 177.8 cm (70\").    Weight as of this encounter: 120 kg (265 lb).               Physical Exam  Vitals reviewed.   Constitutional:       Appearance: Normal appearance.   HENT:      Head: Normocephalic and atraumatic.      Right Ear: External ear normal.      Left Ear: External ear normal.      Mouth/Throat:      Pharynx: No oropharyngeal exudate.   Eyes: "      Conjunctiva/sclera: Conjunctivae normal.   Cardiovascular:      Rate and Rhythm: Normal rate and regular rhythm.      Heart sounds: No murmur heard.     No friction rub. No gallop.   Pulmonary:      Effort: Pulmonary effort is normal.      Breath sounds: Normal breath sounds. No wheezing or rhonchi.   Abdominal:      General: Bowel sounds are normal. There is no distension.      Palpations: Abdomen is soft.      Tenderness: There is no abdominal tenderness.   Musculoskeletal:      Comments: Left knee demonstrates negative valgus and varus stress testing, negative Abdullahi, negative anterior posterior drawer testing.  No effusion noted but there is crepitus noted with active passive range of motion of the left knee.   Skin:     General: Skin is warm and dry.   Neurological:      Mental Status: He is alert and oriented to person, place, and time.      Cranial Nerves: No cranial nerve deficit.   Psychiatric:         Mood and Affect: Mood and affect normal.         Behavior: Behavior normal.         Thought Content: Thought content normal.         Judgment: Judgment normal.        Result Review :                     Assessment and Plan     Diagnoses and all orders for this visit:    1. Primary hypertension (Primary)  -     lisinopril-hydrochlorothiazide (PRINZIDE,ZESTORETIC) 20-25 MG per tablet; Take 1.5 tablets by mouth Daily.  Dispense: 135 tablet; Refill: 3    2. History of pulmonary embolism    3. History of DVT (deep vein thrombosis), left leg    4. Allergic rhinitis, unspecified seasonality, unspecified trigger    5. Mixed hyperlipidemia  -     Lipid Panel; Future    6. Anemia, unspecified type  -     CBC & Differential; Future  -     Comprehensive Metabolic Panel; Future    7. History of nephrectomy, right    8. History of renal cell cancer    9. Stage 3b chronic kidney disease    10. Prediabetes  -     Hemoglobin A1c; Future    11. Screening for prostate cancer    12. Chronic pain of left knee    13.  Vitamin D deficiency  -     Vitamin D,25-Hydroxy; Future    Other orders  -     atorvastatin (Lipitor) 10 MG tablet; Take 1 tablet by mouth Daily.  Dispense: 90 tablet; Refill: 3    I discussed with patient having labs repeated again in 6 months.  Patient instructed to call with any questions or concerns.  We discussed continuing current management for hypertension and hyperlipidemia.  Medications have been refilled today as requested.         Follow Up     Return in about 6 months (around 7/23/2024) for Hypertension.  Patient was given instructions and counseling regarding his condition or for health maintenance advice. Please see specific information pulled into the AVS if appropriate.

## 2024-03-11 RX ORDER — ATORVASTATIN CALCIUM 10 MG/1
10 TABLET, FILM COATED ORAL DAILY
Qty: 90 TABLET | Refills: 3 | Status: SHIPPED | OUTPATIENT
Start: 2024-03-11

## 2024-04-03 ENCOUNTER — APPOINTMENT (OUTPATIENT)
Dept: ULTRASOUND IMAGING | Facility: HOSPITAL | Age: 69
DRG: 872 | End: 2024-04-03
Payer: MEDICARE

## 2024-04-03 ENCOUNTER — APPOINTMENT (OUTPATIENT)
Dept: GENERAL RADIOLOGY | Facility: HOSPITAL | Age: 69
DRG: 872 | End: 2024-04-03
Payer: MEDICARE

## 2024-04-03 ENCOUNTER — HOSPITAL ENCOUNTER (INPATIENT)
Facility: HOSPITAL | Age: 69
LOS: 5 days | Discharge: HOME | DRG: 872 | End: 2024-04-08
Attending: EMERGENCY MEDICINE | Admitting: INTERNAL MEDICINE
Payer: MEDICARE

## 2024-04-03 ENCOUNTER — APPOINTMENT (OUTPATIENT)
Dept: CT IMAGING | Facility: HOSPITAL | Age: 69
DRG: 872 | End: 2024-04-03
Payer: MEDICARE

## 2024-04-03 DIAGNOSIS — N39.0 ACUTE UTI: ICD-10-CM

## 2024-04-03 DIAGNOSIS — R31.0 GROSS HEMATURIA: ICD-10-CM

## 2024-04-03 DIAGNOSIS — A41.9 SEPSIS, DUE TO UNSPECIFIED ORGANISM, UNSPECIFIED WHETHER ACUTE ORGAN DYSFUNCTION PRESENT: Primary | ICD-10-CM

## 2024-04-03 PROBLEM — R31.9 HEMATURIA: Status: ACTIVE | Noted: 2024-04-03

## 2024-04-03 PROBLEM — A49.9 UTI (URINARY TRACT INFECTION), BACTERIAL: Status: ACTIVE | Noted: 2024-04-03

## 2024-04-03 LAB
ALBUMIN SERPL-MCNC: 3.9 G/DL (ref 3.5–5.2)
ALBUMIN/GLOB SERPL: 1.2 G/DL
ALP SERPL-CCNC: 83 U/L (ref 39–117)
ALT SERPL W P-5'-P-CCNC: 48 U/L (ref 1–41)
ANION GAP SERPL CALCULATED.3IONS-SCNC: 15.3 MMOL/L (ref 5–15)
AST SERPL-CCNC: 39 U/L (ref 1–40)
BACTERIA UR QL AUTO: ABNORMAL /HPF
BASOPHILS # BLD AUTO: 0.01 10*3/MM3 (ref 0–0.2)
BASOPHILS NFR BLD AUTO: 0.4 % (ref 0–1.5)
BILIRUB SERPL-MCNC: 0.4 MG/DL (ref 0–1.2)
BILIRUB UR QL STRIP: ABNORMAL
BUN SERPL-MCNC: 20 MG/DL (ref 8–23)
BUN/CREAT SERPL: 12.4 (ref 7–25)
CALCIUM SPEC-SCNC: 9 MG/DL (ref 8.6–10.5)
CHLORIDE SERPL-SCNC: 99 MMOL/L (ref 98–107)
CLARITY UR: ABNORMAL
CO2 SERPL-SCNC: 23.7 MMOL/L (ref 22–29)
COLOR UR: ABNORMAL
CREAT SERPL-MCNC: 1.61 MG/DL (ref 0.76–1.27)
D-LACTATE SERPL-SCNC: 3.1 MMOL/L (ref 0.5–2)
D-LACTATE SERPL-SCNC: 3.6 MMOL/L (ref 0.5–2)
D-LACTATE SERPL-SCNC: 4.2 MMOL/L (ref 0.5–2)
D-LACTATE SERPL-SCNC: 4.4 MMOL/L (ref 0.5–2)
D-LACTATE SERPL-SCNC: 4.4 MMOL/L (ref 0.5–2)
DEPRECATED RDW RBC AUTO: 44.3 FL (ref 37–54)
DEPRECATED RDW RBC AUTO: 45.1 FL (ref 37–54)
EGFRCR SERPLBLD CKD-EPI 2021: 46.6 ML/MIN/1.73
EOSINOPHIL # BLD AUTO: 0.02 10*3/MM3 (ref 0–0.4)
EOSINOPHIL NFR BLD AUTO: 0.7 % (ref 0.3–6.2)
ERYTHROCYTE [DISTWIDTH] IN BLOOD BY AUTOMATED COUNT: 12.5 % (ref 12.3–15.4)
ERYTHROCYTE [DISTWIDTH] IN BLOOD BY AUTOMATED COUNT: 12.5 % (ref 12.3–15.4)
GLOBULIN UR ELPH-MCNC: 3.2 GM/DL
GLUCOSE SERPL-MCNC: 117 MG/DL (ref 65–99)
GLUCOSE UR STRIP-MCNC: ABNORMAL MG/DL
HCT VFR BLD AUTO: 32.3 % (ref 37.5–51)
HCT VFR BLD AUTO: 38.3 % (ref 37.5–51)
HGB BLD-MCNC: 10.2 G/DL (ref 13–17.7)
HGB BLD-MCNC: 12.6 G/DL (ref 13–17.7)
HGB UR QL STRIP.AUTO: ABNORMAL
HOLD SPECIMEN: NORMAL
HOLD SPECIMEN: NORMAL
HYALINE CASTS UR QL AUTO: ABNORMAL /LPF
IMM GRANULOCYTES # BLD AUTO: 0.02 10*3/MM3 (ref 0–0.05)
IMM GRANULOCYTES NFR BLD AUTO: 0.7 % (ref 0–0.5)
KETONES UR QL STRIP: ABNORMAL
LEUKOCYTE ESTERASE UR QL STRIP.AUTO: ABNORMAL
LYMPHOCYTES # BLD AUTO: 0.48 10*3/MM3 (ref 0.7–3.1)
LYMPHOCYTES NFR BLD AUTO: 17.5 % (ref 19.6–45.3)
MAGNESIUM SERPL-MCNC: 1.5 MG/DL (ref 1.6–2.4)
MCH RBC QN AUTO: 31.1 PG (ref 26.6–33)
MCH RBC QN AUTO: 31.8 PG (ref 26.6–33)
MCHC RBC AUTO-ENTMCNC: 31.6 G/DL (ref 31.5–35.7)
MCHC RBC AUTO-ENTMCNC: 32.9 G/DL (ref 31.5–35.7)
MCV RBC AUTO: 96.7 FL (ref 79–97)
MCV RBC AUTO: 98.5 FL (ref 79–97)
MONOCYTES # BLD AUTO: 0.03 10*3/MM3 (ref 0.1–0.9)
MONOCYTES NFR BLD AUTO: 1.1 % (ref 5–12)
NEUTROPHILS NFR BLD AUTO: 2.18 10*3/MM3 (ref 1.7–7)
NEUTROPHILS NFR BLD AUTO: 79.6 % (ref 42.7–76)
NITRITE UR QL STRIP: ABNORMAL
NRBC BLD AUTO-RTO: 0 /100 WBC (ref 0–0.2)
PH UR STRIP.AUTO: ABNORMAL [PH]
PLATELET # BLD AUTO: 147 10*3/MM3 (ref 140–450)
PLATELET # BLD AUTO: 182 10*3/MM3 (ref 140–450)
PMV BLD AUTO: 10.4 FL (ref 6–12)
PMV BLD AUTO: 9.9 FL (ref 6–12)
POTASSIUM SERPL-SCNC: 4 MMOL/L (ref 3.5–5.2)
PROCALCITONIN SERPL-MCNC: 2.59 NG/ML (ref 0–0.25)
PROT SERPL-MCNC: 7.1 G/DL (ref 6–8.5)
PROT UR QL STRIP: ABNORMAL
QT INTERVAL: 316 MS
QTC INTERVAL: 472 MS
RBC # BLD AUTO: 3.28 10*6/MM3 (ref 4.14–5.8)
RBC # BLD AUTO: 3.96 10*6/MM3 (ref 4.14–5.8)
RBC # UR STRIP: ABNORMAL /HPF
REF LAB TEST METHOD: ABNORMAL
SODIUM SERPL-SCNC: 138 MMOL/L (ref 136–145)
SP GR UR STRIP: 1.01 (ref 1–1.03)
SQUAMOUS #/AREA URNS HPF: ABNORMAL /HPF
TROPONIN T SERPL HS-MCNC: 43 NG/L
UROBILINOGEN UR QL STRIP: ABNORMAL
WBC # UR STRIP: ABNORMAL /HPF
WBC NRBC COR # BLD AUTO: 2.74 10*3/MM3 (ref 3.4–10.8)
WBC NRBC COR # BLD AUTO: 24.57 10*3/MM3 (ref 3.4–10.8)
WHOLE BLOOD HOLD COAG: NORMAL
WHOLE BLOOD HOLD SPECIMEN: NORMAL

## 2024-04-03 PROCEDURE — 85025 COMPLETE CBC W/AUTO DIFF WBC: CPT | Performed by: EMERGENCY MEDICINE

## 2024-04-03 PROCEDURE — 25010000002 MAGNESIUM SULFATE IN D5W 1G/100ML (PREMIX) 1-5 GM/100ML-% SOLUTION: Performed by: FAMILY MEDICINE

## 2024-04-03 PROCEDURE — 25810000003 LACTATED RINGERS SOLUTION: Performed by: INTERNAL MEDICINE

## 2024-04-03 PROCEDURE — 93010 ELECTROCARDIOGRAM REPORT: CPT | Performed by: SPECIALIST

## 2024-04-03 PROCEDURE — 80053 COMPREHEN METABOLIC PANEL: CPT | Performed by: EMERGENCY MEDICINE

## 2024-04-03 PROCEDURE — 25010000002 MORPHINE PER 10 MG: Performed by: FAMILY MEDICINE

## 2024-04-03 PROCEDURE — 99223 1ST HOSP IP/OBS HIGH 75: CPT | Performed by: FAMILY MEDICINE

## 2024-04-03 PROCEDURE — 83605 ASSAY OF LACTIC ACID: CPT | Performed by: EMERGENCY MEDICINE

## 2024-04-03 PROCEDURE — 25810000003 SODIUM CHLORIDE 0.9 % SOLUTION: Performed by: FAMILY MEDICINE

## 2024-04-03 PROCEDURE — 25810000003 LACTATED RINGERS PER 1000 ML: Performed by: INTERNAL MEDICINE

## 2024-04-03 PROCEDURE — 74177 CT ABD & PELVIS W/CONTRAST: CPT

## 2024-04-03 PROCEDURE — 93005 ELECTROCARDIOGRAM TRACING: CPT

## 2024-04-03 PROCEDURE — 36415 COLL VENOUS BLD VENIPUNCTURE: CPT | Performed by: EMERGENCY MEDICINE

## 2024-04-03 PROCEDURE — 85027 COMPLETE CBC AUTOMATED: CPT | Performed by: INTERNAL MEDICINE

## 2024-04-03 PROCEDURE — 83735 ASSAY OF MAGNESIUM: CPT | Performed by: EMERGENCY MEDICINE

## 2024-04-03 PROCEDURE — 87186 SC STD MICRODIL/AGAR DIL: CPT | Performed by: EMERGENCY MEDICINE

## 2024-04-03 PROCEDURE — 25010000002 CEFTRIAXONE PER 250 MG: Performed by: EMERGENCY MEDICINE

## 2024-04-03 PROCEDURE — 87077 CULTURE AEROBIC IDENTIFY: CPT | Performed by: EMERGENCY MEDICINE

## 2024-04-03 PROCEDURE — 25810000003 LACTATED RINGERS SOLUTION: Performed by: EMERGENCY MEDICINE

## 2024-04-03 PROCEDURE — 84145 PROCALCITONIN (PCT): CPT | Performed by: EMERGENCY MEDICINE

## 2024-04-03 PROCEDURE — 99221 1ST HOSP IP/OBS SF/LOW 40: CPT | Performed by: UROLOGY

## 2024-04-03 PROCEDURE — 81001 URINALYSIS AUTO W/SCOPE: CPT | Performed by: EMERGENCY MEDICINE

## 2024-04-03 PROCEDURE — 25810000003 LACTATED RINGERS PER 1000 ML: Performed by: FAMILY MEDICINE

## 2024-04-03 PROCEDURE — 76775 US EXAM ABDO BACK WALL LIM: CPT

## 2024-04-03 PROCEDURE — 25510000001 IOPAMIDOL PER 1 ML: Performed by: EMERGENCY MEDICINE

## 2024-04-03 PROCEDURE — 87147 CULTURE TYPE IMMUNOLOGIC: CPT | Performed by: EMERGENCY MEDICINE

## 2024-04-03 PROCEDURE — 87040 BLOOD CULTURE FOR BACTERIA: CPT | Performed by: EMERGENCY MEDICINE

## 2024-04-03 PROCEDURE — 71045 X-RAY EXAM CHEST 1 VIEW: CPT

## 2024-04-03 PROCEDURE — 87154 CUL TYP ID BLD PTHGN 6+ TRGT: CPT | Performed by: EMERGENCY MEDICINE

## 2024-04-03 PROCEDURE — 84484 ASSAY OF TROPONIN QUANT: CPT | Performed by: EMERGENCY MEDICINE

## 2024-04-03 PROCEDURE — 99291 CRITICAL CARE FIRST HOUR: CPT

## 2024-04-03 PROCEDURE — 93005 ELECTROCARDIOGRAM TRACING: CPT | Performed by: EMERGENCY MEDICINE

## 2024-04-03 PROCEDURE — 87086 URINE CULTURE/COLONY COUNT: CPT | Performed by: EMERGENCY MEDICINE

## 2024-04-03 RX ORDER — MORPHINE SULFATE 2 MG/ML
1 INJECTION, SOLUTION INTRAMUSCULAR; INTRAVENOUS EVERY 4 HOURS PRN
Status: ACTIVE | OUTPATIENT
Start: 2024-04-03 | End: 2024-04-08

## 2024-04-03 RX ORDER — SODIUM CHLORIDE 9 MG/ML
40 INJECTION, SOLUTION INTRAVENOUS AS NEEDED
Status: DISCONTINUED | OUTPATIENT
Start: 2024-04-03 | End: 2024-04-08 | Stop reason: HOSPADM

## 2024-04-03 RX ORDER — FERROUS SULFATE 325(65) MG
325 TABLET ORAL DAILY
COMMUNITY

## 2024-04-03 RX ORDER — POLYETHYLENE GLYCOL 3350 17 G/17G
17 POWDER, FOR SOLUTION ORAL DAILY PRN
Status: DISCONTINUED | OUTPATIENT
Start: 2024-04-03 | End: 2024-04-08 | Stop reason: HOSPADM

## 2024-04-03 RX ORDER — MELATONIN
1000 DAILY
COMMUNITY

## 2024-04-03 RX ORDER — ACETAMINOPHEN 325 MG/1
650 TABLET ORAL EVERY 6 HOURS PRN
Status: DISCONTINUED | OUTPATIENT
Start: 2024-04-03 | End: 2024-04-08 | Stop reason: HOSPADM

## 2024-04-03 RX ORDER — SODIUM CHLORIDE 0.9 % (FLUSH) 0.9 %
10 SYRINGE (ML) INJECTION AS NEEDED
Status: DISCONTINUED | OUTPATIENT
Start: 2024-04-03 | End: 2024-04-08 | Stop reason: HOSPADM

## 2024-04-03 RX ORDER — SODIUM CHLORIDE, SODIUM LACTATE, POTASSIUM CHLORIDE, CALCIUM CHLORIDE 600; 310; 30; 20 MG/100ML; MG/100ML; MG/100ML; MG/100ML
125 INJECTION, SOLUTION INTRAVENOUS CONTINUOUS
Status: ACTIVE | OUTPATIENT
Start: 2024-04-03 | End: 2024-04-03

## 2024-04-03 RX ORDER — ACETAMINOPHEN 325 MG/1
975 TABLET ORAL ONCE
Status: COMPLETED | OUTPATIENT
Start: 2024-04-03 | End: 2024-04-03

## 2024-04-03 RX ORDER — MORPHINE SULFATE 2 MG/ML
2 INJECTION, SOLUTION INTRAMUSCULAR; INTRAVENOUS EVERY 4 HOURS PRN
Status: DISCONTINUED | OUTPATIENT
Start: 2024-04-03 | End: 2024-04-08 | Stop reason: HOSPADM

## 2024-04-03 RX ORDER — SODIUM CHLORIDE, SODIUM LACTATE, POTASSIUM CHLORIDE, CALCIUM CHLORIDE 600; 310; 30; 20 MG/100ML; MG/100ML; MG/100ML; MG/100ML
125 INJECTION, SOLUTION INTRAVENOUS CONTINUOUS
Status: DISCONTINUED | OUTPATIENT
Start: 2024-04-03 | End: 2024-04-05

## 2024-04-03 RX ORDER — AMOXICILLIN 250 MG
2 CAPSULE ORAL 2 TIMES DAILY PRN
Status: DISCONTINUED | OUTPATIENT
Start: 2024-04-03 | End: 2024-04-08 | Stop reason: HOSPADM

## 2024-04-03 RX ORDER — NALOXONE HCL 0.4 MG/ML
0.4 VIAL (ML) INJECTION
Status: DISCONTINUED | OUTPATIENT
Start: 2024-04-03 | End: 2024-04-08 | Stop reason: HOSPADM

## 2024-04-03 RX ORDER — BISACODYL 5 MG/1
5 TABLET, DELAYED RELEASE ORAL DAILY PRN
Status: DISCONTINUED | OUTPATIENT
Start: 2024-04-03 | End: 2024-04-08 | Stop reason: HOSPADM

## 2024-04-03 RX ORDER — BISACODYL 10 MG
10 SUPPOSITORY, RECTAL RECTAL DAILY PRN
Status: DISCONTINUED | OUTPATIENT
Start: 2024-04-03 | End: 2024-04-08 | Stop reason: HOSPADM

## 2024-04-03 RX ORDER — SODIUM CHLORIDE 0.9 % (FLUSH) 0.9 %
10 SYRINGE (ML) INJECTION EVERY 12 HOURS SCHEDULED
Status: DISCONTINUED | OUTPATIENT
Start: 2024-04-03 | End: 2024-04-08 | Stop reason: HOSPADM

## 2024-04-03 RX ORDER — MAGNESIUM SULFATE 1 G/100ML
1 INJECTION INTRAVENOUS ONCE
Status: COMPLETED | OUTPATIENT
Start: 2024-04-03 | End: 2024-04-03

## 2024-04-03 RX ORDER — SODIUM CHLORIDE 9 MG/ML
100 INJECTION, SOLUTION INTRAVENOUS CONTINUOUS
Status: DISCONTINUED | OUTPATIENT
Start: 2024-04-03 | End: 2024-04-03

## 2024-04-03 RX ADMIN — ACETAMINOPHEN 975 MG: 325 TABLET ORAL at 03:25

## 2024-04-03 RX ADMIN — CEFTRIAXONE SODIUM 2000 MG: 2 INJECTION, POWDER, FOR SOLUTION INTRAMUSCULAR; INTRAVENOUS at 04:52

## 2024-04-03 RX ADMIN — SODIUM CHLORIDE, POTASSIUM CHLORIDE, SODIUM LACTATE AND CALCIUM CHLORIDE 125 ML/HR: 600; 310; 30; 20 INJECTION, SOLUTION INTRAVENOUS at 16:29

## 2024-04-03 RX ADMIN — SODIUM CHLORIDE 100 ML/HR: 9 INJECTION, SOLUTION INTRAVENOUS at 08:28

## 2024-04-03 RX ADMIN — Medication 10 ML: at 09:38

## 2024-04-03 RX ADMIN — SODIUM CHLORIDE, POTASSIUM CHLORIDE, SODIUM LACTATE AND CALCIUM CHLORIDE 1000 ML: 600; 310; 30; 20 INJECTION, SOLUTION INTRAVENOUS at 10:56

## 2024-04-03 RX ADMIN — SODIUM CHLORIDE, POTASSIUM CHLORIDE, SODIUM LACTATE AND CALCIUM CHLORIDE 75 ML/HR: 600; 310; 30; 20 INJECTION, SOLUTION INTRAVENOUS at 09:38

## 2024-04-03 RX ADMIN — SODIUM CHLORIDE, POTASSIUM CHLORIDE, SODIUM LACTATE AND CALCIUM CHLORIDE 1000 ML: 600; 310; 30; 20 INJECTION, SOLUTION INTRAVENOUS at 03:23

## 2024-04-03 RX ADMIN — ACETAMINOPHEN 650 MG: 325 TABLET ORAL at 17:05

## 2024-04-03 RX ADMIN — SODIUM CHLORIDE, POTASSIUM CHLORIDE, SODIUM LACTATE AND CALCIUM CHLORIDE 2802 ML: 600; 310; 30; 20 INJECTION, SOLUTION INTRAVENOUS at 05:46

## 2024-04-03 RX ADMIN — MORPHINE SULFATE 2 MG: 2 INJECTION, SOLUTION INTRAMUSCULAR; INTRAVENOUS at 14:12

## 2024-04-03 RX ADMIN — SODIUM CHLORIDE, POTASSIUM CHLORIDE, SODIUM LACTATE AND CALCIUM CHLORIDE 1000 ML: 600; 310; 30; 20 INJECTION, SOLUTION INTRAVENOUS at 16:29

## 2024-04-03 RX ADMIN — MAGNESIUM SULFATE 1 G: 1 INJECTION INTRAVENOUS at 05:43

## 2024-04-03 RX ADMIN — IOPAMIDOL 100 ML: 755 INJECTION, SOLUTION INTRAVENOUS at 02:49

## 2024-04-03 NOTE — PLAN OF CARE
Goal Outcome Evaluation:patient alert oriented. Still having blood in urine. Temp up to 100.3 prn given. Treating flank pain with prn pain medications. Bolus ivf given along with continuous for soft bp. Continuing plan of care

## 2024-04-03 NOTE — PROGRESS NOTES
[facility]   Hospitalist Progress Note  Date: 4/3/2024  Patient Name: Facundo Hinds  : 1955  MRN: [id number]  Date of admission: 4/3/2024  Room/Bed: [facility]      Subjective   Subjective     Chief Complaint:   Hematuria    Summary:  Facundo Hinds is a 69 y.o. male with medical history of hypertension, PE on Eliquis, renal cell carcinoma, CKD, AGUSTÍN, and GERD presented to the ED with hematuria.  Patient started with hematuria around 8 PM on the evening of 2024.  Patient endorses associated dysuria, subjective fevers chills diaphoresis and rigors.  Patient with a history of renal cell carcinoma, similar episode 4 years ago while in [place].  Workup included cystoscopy with no significant findings. In the ED patient was febrile and tachycardic on arrival with remaining vitals being within normal limits.  Labs showed that he had elevated lactic acid level with leukopenia, elevated procalcitonin, hypomagnesemia, mild anemia, and gross hematuria on urinalysis.  CT of the abdomen showed multiple left renal cyst with hyperdense lesions, Cystic lesion at arising from the prostate gland with mass effect on the bladder base along with asymmetric bladder wall thickening.  Urology was consulted in the emergency department.    Interval Followup:   Patient still endorses hematuria and dysuria.  Hemoglobin stable since arrival here in the hospital.  Patient's white count went from 2.74 up to 24.  Lactate remains elevated, rebolusing IV fluids.  Blood pressure remains soft however patient perfusing well    Objective   Objective     Vitals:   Temp:  [98.6 °F (37 °C)-100.9 °F (38.3 °C)] 98.6 °F (37 °C)  Heart Rate:  [] 74  Resp:  [20-26] 20  BP: (69-98)/(48-68) 95/68    Physical Exam               Constitutional: Awake, alert              Eyes: PERRLA, sclerae anicteric, no conjunctival injection              HENT: NCAT, mucous membranes moist              Neck: Supple, no thyromegaly, no lymphadenopathy,  trachea midline              Respiratory: Clear to auscultation bilaterally, nonlabored respirations               Cardiovascular: RRR, no murmurs, rubs, or gallops, palpable pedal pulses bilaterally              Gastrointestinal: Positive bowel sounds, soft, nontender, nondistended              Musculoskeletal: No bilateral ankle edema, no clubbing or cyanosis to extremities              Neurologic: Oriented x 3, strength symmetric in all extremities, Cranial Nerves grossly intact to confrontation, speech clear    Result Review    Result Review:  I have personally reviewed these results:  [x]  Laboratory      Lab 04/03/24  1326 04/03/24  1120 04/03/24  0953 04/03/24  0828 04/03/24  0341 04/03/24  0150 04/03/24  0108   WBC  --  24.57*  --   --   --   --  2.74*   HEMOGLOBIN  --  10.2*  --   --   --   --  12.6*   HEMATOCRIT  --  32.3*  --   --   --   --  38.3   PLATELETS  --  147  --   --   --   --  182   NEUTROS ABS  --   --   --   --   --   --  2.18   IMMATURE GRANS (ABS)  --   --   --   --   --   --  0.02   LYMPHS ABS  --   --   --   --   --   --  0.48*   MONOS ABS  --   --   --   --   --   --  0.03*   EOS ABS  --   --   --   --   --   --  0.02   MCV  --  98.5*  --   --   --   --  96.7   PROCALCITONIN  --   --   --   --   --  2.59*  --    LACTATE 4.4*  --  4.4* 4.2*   < >  --   --     < > = values in this interval not displayed.         Lab 04/03/24  0150   SODIUM 138   POTASSIUM 4.0   CHLORIDE 99   CO2 23.7   ANION GAP 15.3*   BUN 20   CREATININE 1.61*   EGFR 46.6*   GLUCOSE 117*   CALCIUM 9.0   MAGNESIUM 1.5*         Lab 04/03/24  0150   TOTAL PROTEIN 7.1   ALBUMIN 3.9   GLOBULIN 3.2   ALT (SGPT) 48*   AST (SGOT) 39   BILIRUBIN 0.4   ALK PHOS 83         Lab 04/03/24  0150   HSTROP T 43*                 Brief Urine Lab Results  (Last result in the past 365 days)        Color   Clarity   Blood   Leuk Est   Nitrite   Protein   CREAT   Urine HCG        04/03/24 0125 Red   Turbid   --  Comment: Result not available  due to interfering substances.   --  Comment: Result not available due to interfering substances.   --  Comment: Result not available due to interfering substances.   --  Comment: Result not available due to interfering substances.                 [x]  Microbiology   Microbiology Results (last 10 days)       ** No results found for the last 240 hours. **          [x]  Radiology  US Renal Bilateral    Result Date: 4/3/2024   1. Nonvisualization right kidney compatible with history of surgical resection  2. Simple cyst left kidney without evidence of hydronephrosis.  3. Cystic structure at the base of the bladder within the region of the prostate incompletely visualized. This is of indeterminate etiology as discussed on CT. This is better seen on prior CT comparison.   Electronically Signed By-Guille Cali On:4/3/2024 2:17 PM      CT Abdomen Pelvis With Contrast    Result Date: 4/3/2024   1. Multiple left renal cysts with at least 3 hyperdense left renal lesions. These are not simple cysts, and further characterization with nonemergent ultrasound is recommended as a starting point. No hydronephrosis. 2. Right nephrectomy within normal right nephrectomy bed. 3. Cystic lesion arising from the prostate gland exerting mass effect on the bladder base, nonspecific. Considerations would include an abscess, hypodense mass, or other cystic prostatic lesion. Additionally, there is some asymmetric bladder wall thickening, possibly indicative of cystitis. However, urology consultation is recommended for all of these findings. 4. Tiny hiatal hernia with gastroesophageal reflux. 5. Hepatic steatosis.   Electronically Signed By-Dr. Jesse Gonzalez MD On:4/3/2024 3:24 AM      XR Chest 1 View    Result Date: 4/3/2024  Mild scarring/atelectasis in the bases, otherwise no active disease.  Electronically Signed By-Dr. Jesse Gonzalez MD On:4/3/2024 1:22 AM     []  EKG/Telemetry   []  Cardiology/Vascular   []  Pathology  []  Old  records  []  Other:    Assessment & Plan   Assessment / Plan     Assessment:  Severe sepsis due to urinary tract infection.  Fever, tachycardia, lactic acidosis  Hematuria  Lactic acidosis  History of hypertension  History of pulmonary embolus on Eliquis  CKD  AGUSTÍN  History of renal cell carcinoma    Plan:  Patient remains admitted to hospital for further care management  Neurology consulted, appreciate assistance  Continue to monitor urinary output closely, has not been urinating any clots, always concern for retention secondary to clots  Continues on ceftriaxone at this time will adjust antibiotics if needed  Eliquis currently on hold  Continues on IV fluids, rebolus as needed  Continue to trend lactate  Started on diet  Will need home antihypertensives     Discussed with RN, neurologist    DVT prophylaxis:  Medical and mechanical DVT prophylaxis orders are present.        CODE STATUS:   Code Status (Patient has no pulse and is not breathing): CPR (Attempt to Resuscitate)  Medical Interventions (Patient has pulse or is breathing): Full Support      Electronically signed by Uriel Mac MD, 4/3/2024, 16:16 EDT.

## 2024-04-03 NOTE — H&P
[facility]   HISTORY AND PHYSICAL    Patient Name: Facundo Hinds  : 1955  MRN: [id number]  Primary Care Physician:  Ever Topete DO  Date of admission: 4/3/2024    Subjective   Subjective     Chief Complaint: Hematuria      HPI:    Facundo Hinds is a 69 y.o. male  With past medical history of hypertension, PE on Eliquis, renal cell carcinoma, CKD, AGUSTÍN, and GERD presented to the ED with hematuria.  Patient states that around 8 PM last night he had sudden onset hematuria with dysuria, subjective fevers, chills, diaphoresis, and rigors.  Patient does have history of renal cell carcinoma and had similar episode 4 years ago while in [place] where he had a full workup including a cystoscopy with no significant findings.  Due to his continued gross hematuria he came to the ED for further evaluation.  In the ED patient was febrile and tachycardic on arrival with remaining vitals being within normal limits.  Labs showed that he had elevated lactic acid level with leukopenia, elevated procalcitonin, hypomagnesemia, mild anemia, and gross hematuria on urinalysis.  CT of the abdomen showed multiple left renal cyst with hyperdense lesions,  Cystic lesion at arising from the prostate gland with mass effect on the bladder base along with asymmetric bladder wall thickening.  Urology was consulted and agreed to see the patient in the morning.  When asked he denied any headaches, focal weakness, chest pain, palpitation, shortness of breath, cough,  nausea, vomiting, diarrhea, constipation, hematochezia, melena, or anxiety.  Patient admitted for further evaluation and treatment.    Review of Systems   All systems were reviewed and negative except for: As per HPI    Personal History     Past Medical History:   Diagnosis Date    Arthritis     Cancer     Colon polyp     Deafness     EKG abnormalities 2017    Hemorrhoids     HTN (hypertension)     Kidney disorder     Night sweats     Renal insufficiency      Sinus trouble     SOB (shortness of breath)        Past Surgical History:   Procedure Laterality Date    COLONOSCOPY  2004, 2018    COLONOSCOPY N/A 7/26/2023    Procedure: COLONOSCOPY WITH POLYPECTOMIES, HOT SNARE;  Surgeon: Edward Arce MD;  Location: [facility] ENDOSCOPY;  Service: Gastroenterology;  Laterality: N/A;  COLON POLYPS, DIVERTICULOSIS, HEMORRHOIDS    KIDNEY SURGERY      SHOULDER SURGERY Left 2019    TESTICLE SURGERY         Family History: family history includes Arthritis in his mother; Cancer in his brother. Otherwise pertinent FHx was reviewed and not pertinent to current issue.    Social History:  reports that he has never smoked. He has been exposed to tobacco smoke. He has never used smokeless tobacco. He reports that he does not drink alcohol and does not use drugs.    Home Medications:  acetaminophen, apixaban, atorvastatin, cetirizine, fluticasone, lisinopril-hydrochlorothiazide, and multivitamin with minerals      Allergies:  No Known Allergies    Objective   Objective     Vitals:   Temp:  [100.9 °F (38.3 °C)] 100.9 °F (38.3 °C)  Heart Rate:  [136] 136  Resp:  [26] 26  BP: (98)/(62) 98/62  Physical Exam    Constitutional: Awake, alert   Eyes: PERRLA, sclerae anicteric, no conjunctival injection   HENT: NCAT, mucous membranes moist   Neck: Supple, no thyromegaly, no lymphadenopathy, trachea midline   Respiratory: Clear to auscultation bilaterally, nonlabored respirations    Cardiovascular: RRR, no murmurs, rubs, or gallops, palpable pedal pulses bilaterally   Gastrointestinal: Positive bowel sounds, soft, nontender, nondistended   Musculoskeletal: No bilateral ankle edema, no clubbing or cyanosis to extremities   Psychiatric: Appropriate affect, cooperative   Neurologic: Oriented x 3, strength symmetric in all extremities, Cranial Nerves grossly intact to confrontation, speech clear   Skin: No rashes     Result Review    Result Review:  I have personally reviewed the results from the  time of this admission to 4/3/2024 06:11 EDT and agree with these findings:  [x]  Laboratory list / accordion  []  Microbiology  [x]  Radiology  [x]  EKG/Telemetry   []  Cardiology/Vascular   []  Pathology  []  Old records  []  Other:  Most notable findings include: Lactic acidosis, elevated procalcitonin level, leukopenia, gross hematuria, YANCY, CT abdomen with multiple renal lesions, asymmetric bladder wall thickening, cystic lesion from the prostate gland with mass effect on the bladder base.      Assessment & Plan   Assessment / Plan     Brief Patient Summary:  Facundo Hinds is a 69 y.o. male  With past medical history of hypertension, PE on Eliquis, renal cell carcinoma, CKD, AGUSTÍN, and GERD presented to the ED with hematuria    Active Hospital Problems:  Active Hospital Problems    Diagnosis     **Hematuria     UTI (urinary tract infection), bacterial     History of nephrectomy, right     Kidney mass     History of pulmonary embolism     History of DVT (deep vein thrombosis)     Obesity due to excess calories     Stage 3a chronic kidney disease     Hypertension      Plan:     Hematuria  -Admit to telemetry  -History of RCC  -Similar episode 4 years ago  -CT reviewed.  Asymmetric bladder wall thickening and cystic lesion from the prostate gland with mass effect on the bladder base noted.  Multiple renal lesions as well  -Hemoglobin 12.6, will follow  -Transfuse if needed  -Patient with significant dysuria, likely UTI  -Empiric antibiotics  -Blood and urine cultures  -Pain meds as needed  -Urology consulted  -N.p.o.  -IVF  -Patient on Eliquis for PE, will hold  -Supportive care      HTN  -Currently well controlled  -PRN BP meds  -Resume home meds when available  -Titrate if needed    UTI  -UA reviewed  -Patient symptomatic  -Empiric antibiotics  -Blood and urine cultures  -IVF    PE  -Hold Eliquis    CKD  AGUSTÍN  History of RCC    GI ppx  DVT ppx    CODE STATUS: Full code     Admission Status:  I believe this  patient meets inpatient status.      Electronically signed by Alberto Patel MD, 04/03/24, 6:11 AM EDT.

## 2024-04-03 NOTE — ED PROVIDER NOTES
"Time: 5:12 AM EDT  Date of encounter:  4/3/2024  Independent Historian/Clinical History and Information was obtained by:   Patient  Chief Complaint: Urinating blood    History is limited by: N/A    History of Present Illness:  Patient is a 69 y.o. year old male who presents to the emergency department for evaluation of urinating blood.  Patient states symptoms began around 8 PM tonight.  Patient is he began urinating \"lots\" of blood.  Patient states bright red in color.  Patient also complains of severe burning with urination.  Patient states he just describes it as feeling like he is \"peeing razor blades\".  Patient also states this evening he had uncontrolled shaking and chills.  Patient reports at the same time his heart rate spiked.  Patient was concerned because of all of his symptoms and presents to the ER for evaluation.  Patient is on Eliquis due to a pulmonary embolism in the past.  Patient also has a history of a right nephrectomy approximately 22 years ago due to renal cell carcinoma.    HPI    Patient Care Team  Primary Care Provider: Ever Topete DO    Past Medical History:     No Known Allergies  Past Medical History:   Diagnosis Date    Arthritis     Cancer     Colon polyp     Deafness     EKG abnormalities 2017    Hemorrhoids     HTN (hypertension)     Kidney disorder     Night sweats     Renal insufficiency     Sinus trouble     SOB (shortness of breath)      Past Surgical History:   Procedure Laterality Date    COLONOSCOPY  ,     COLONOSCOPY N/A 2023    Procedure: COLONOSCOPY WITH POLYPECTOMIES, HOT SNARE;  Surgeon: Edward Arce MD;  Location: [facility] ENDOSCOPY;  Service: Gastroenterology;  Laterality: N/A;  COLON POLYPS, DIVERTICULOSIS, HEMORRHOIDS    KIDNEY SURGERY      SHOULDER SURGERY Left 2019    TESTICLE SURGERY       Family History   Problem Relation Age of Onset    Arthritis Mother             Cancer Brother             Colon cancer Neg " Hx        Home Medications:  Prior to Admission medications    Medication Sig Start Date End Date Taking? Authorizing Provider   acetaminophen (TYLENOL) 500 MG tablet Take 1 tablet by mouth Every 6 (Six) Hours As Needed for Mild Pain.    ProviderJosé Luis MD   apixaban (ELIQUIS) 5 MG tablet tablet Take 1 tablet by mouth 2 (Two) Times a Day. 12/18/23   Ever Topete DO   atorvastatin (Lipitor) 10 MG tablet Take 1 tablet by mouth Daily. 3/11/24   Ever Topete DO   cetirizine (zyrTEC) 10 MG tablet Take 1 tablet by mouth Daily. 6/26/23   Shailesh Barr APRN   fluticasone (Flonase) 50 MCG/ACT nasal spray 2 sprays into the nostril(s) as directed by provider Daily. 2/28/23   Shailesh Barr APRN   lisinopril-hydrochlorothiazide (PRINZIDE,ZESTORETIC) 20-25 MG per tablet Take 1.5 tablets by mouth Daily. 1/23/24   Ever Topete DO   multivitamin with minerals tablet tablet Take 1 tablet by mouth Daily.    Provider, MD José Luis        Social History:   Social History     Tobacco Use    Smoking status: Never     Passive exposure: Past    Smokeless tobacco: Never   Vaping Use    Vaping status: Never Used   Substance Use Topics    Alcohol use: Never    Drug use: Never         Review of Systems:  Review of Systems   Constitutional:  Positive for chills and diaphoresis. Negative for fever.   HENT:  Negative for congestion, ear pain and sore throat.    Eyes:  Negative for pain.   Respiratory:  Negative for cough, chest tightness and shortness of breath.    Cardiovascular:  Negative for chest pain.   Gastrointestinal:  Negative for abdominal pain, diarrhea, nausea and vomiting.   Genitourinary:  Positive for dysuria, flank pain and hematuria.   Musculoskeletal:  Negative for joint swelling.   Skin:  Negative for pallor.   Neurological:  Negative for seizures and headaches.   All other systems reviewed and are negative.       Physical Exam:  /68 (BP Location: Right arm, Patient Position: Lying)   Pulse  "82   Temp 100.1 °F (37.8 °C) (Oral)   Resp 18   Ht 177.8 cm (70\")   Wt 124 kg (272 lb 4.3 oz)   SpO2 95%   BMI 39.07 kg/m²     Physical Exam    Vital signs were reviewed under triage note.  General appearance - Patient appears well-developed and well-nourished.  Patient is in no acute distress.  Head - Normocephalic, atraumatic.  Pupils - Equal, round, reactive to light.  Extraocular muscles are intact.  Conjunctiva is clear.  Nasal - Normal inspection.  No evidence of trauma or epistaxis.  Tympanic membranes - Gray, intact without erythema or retractions.  Oral mucosa - Pink and moist without lesions or erythema.  Uvula is midline.  Chest wall - Atraumatic.  Chest wall is nontender.  There are no vesicular rashes noted.  Neck - Supple.  Trachea was midline.  There is no palpable lymphadenopathy or thyromegaly.  There are no meningeal signs  Lungs - Clear to auscultation and percussion bilaterally.  Heart - Tachycardic rate but with a regular rhythm without any murmurs, clicks, or gallops.  Abdomen - Soft.  Bowel sounds are present.  There is no palpable tenderness.  There is no rebound, guarding, or rigidity.  There are no palpable masses.  There are no pulsatile masses.  Back - Spine is straight and midline.  There is no CVA tenderness.  Extremities - Intact x4 with full range of motion.  There is no palpable edema.  Pulses are intact x4 and equal.  Neurologic - Patient is awake, alert, and oriented x3.  Cranial nerves II through XII are grossly intact.  Motor and sensory functions grossly intact.  Cerebellar function was normal.  Integument - There are no rashes.  There are no petechia or purpura lesions noted.  There are no vesicular lesions noted.           Procedures:  Procedures      Medical Decision Making:      Comorbidities that affect care:    Remote history of renal cell carcinoma, hypertension, renal insufficiency    External Notes reviewed:    Previous Clinic Note: Office visit with Dr. Topete " from 1/23/2024 was reviewed by me.      The following orders were placed and all results were independently analyzed by me:  Orders Placed This Encounter   Procedures    Blood Culture - Blood,    Blood Culture - Blood,    Urine Culture - Urine,    XR Chest 1 View    CT Abdomen Pelvis With Contrast    US Renal Bilateral    [place] Draw    Comprehensive Metabolic Panel    Single High Sensitivity Troponin T    Magnesium    Urinalysis With Microscopic If Indicated (No Culture) - Urine, Clean Catch    CBC Auto Differential    Urinalysis, Microscopic Only - Urine, Clean Catch    Lactic Acid, Plasma    Procalcitonin    STAT Lactic Acid, Reflex    Basic Metabolic Panel    CBC (No Diff)    CBC (No Diff)    STAT Lactic Acid, Reflex    STAT Lactic Acid, Reflex    STAT Lactic Acid, Reflex    Magnesium    STAT Lactic Acid, Reflex    Diet: Regular/House; Fluid Consistency: Thin (IDDSI 0)    Undress & Gown    Continuous Pulse Oximetry    Vital Signs    Orthostatic Blood Pressure    Vital Signs    Intake & Output    Weigh Patient    Oral Care    Saline Lock & Maintain IV Access    Place Sequential Compression Device    Maintain Sequential Compression Device    Activity - Ad Sharyn    Code Status and Medical Interventions:    Urology (on-call MD unless specified)    Hospitalist (on-call MD unless specified)    Oxygen Therapy- Nasal Cannula; Titrate 1-6 LPM Per SpO2; 90 - 95%    POC Glucose Once    ECG 12 Lead ED Triage Standing Order; Weak / Dizzy / AMS    Insert Peripheral IV    Insert Peripheral IV    Inpatient Admission    Fall Precautions    CBC & Differential    Green Top (Gel)    Lavender Top    Gold Top - SST    Light Blue Top       Medications Given in the Emergency Department:  Medications   sodium chloride 0.9 % flush 10 mL (has no administration in time range)   apixaban (ELIQUIS) tablet 5 mg ( Oral Dose Auto Held 4/11/24 2100)   sodium chloride 0.9 % flush 10 mL (10 mL Intravenous Given 4/3/24 0938)   sodium chloride 0.9 %  flush 10 mL (has no administration in time range)   sodium chloride 0.9 % infusion 40 mL (has no administration in time range)   cefTRIAXone (ROCEPHIN) 1000 mg/100 mL 0.9% NS (MBP) (has no administration in time range)   lactated ringers infusion (125 mL/hr Intravenous Rate/Dose Change 4/3/24 1056)   sennosides-docusate (PERICOLACE) 8.6-50 MG per tablet 2 tablet (has no administration in time range)     And   polyethylene glycol (MIRALAX) packet 17 g (has no administration in time range)     And   bisacodyl (DULCOLAX) EC tablet 5 mg (has no administration in time range)     And   bisacodyl (DULCOLAX) suppository 10 mg (has no administration in time range)   morphine injection 1 mg (has no administration in time range)     And   naloxone (NARCAN) injection 0.4 mg (has no administration in time range)   morphine injection 2 mg (2 mg Intravenous Given 4/3/24 1412)   lactated ringers infusion (125 mL/hr Intravenous New Bag 4/3/24 1629)   acetaminophen (TYLENOL) tablet 650 mg (650 mg Oral Given 4/3/24 1705)   acetaminophen (TYLENOL) tablet 975 mg (975 mg Oral Given 4/3/24 0325)   lactated ringers bolus 1,000 mL (0 mL Intravenous Stopped 4/3/24 0451)   cefTRIAXone (ROCEPHIN) 2000 mg/100 mL 0.9% NS IVPB (MBP) (0 mg Intravenous Stopped 4/3/24 0540)   iopamidol (ISOVUE-370) 76 % injection 100 mL (100 mL Intravenous Given 4/3/24 0249)   sepsis fluid LR bolus 2,802 mL (2,802 mL Intravenous New Bag 4/3/24 0546)   magnesium sulfate in D5W 1g/100mL (PREMIX) (0 g Intravenous Stopped 4/3/24 0650)   lactated ringers bolus 1,000 mL (1,000 mL Intravenous New Bag 4/3/24 1056)   lactated ringers bolus 1,000 mL (1,000 mL Intravenous New Bag 4/3/24 1629)        ED Course:    ED Course as of 04/03/24 2046 Wed Apr 03, 2024   0511 EKG performed at 105 was interpreted by me to show a sinus tachycardia with a ventricular rate of 134 bpm.  The AL interval is 127 ms.  P waves were normal.  QRS interval was 122 ms.  Patient is a right bundle  branch block.  Axis is leftward -68 degrees.  Patient is a left anterior fascicular block.  Patient also has voltage criteria suggesting LVH.  There are some nonspecific ST-T wave change identified.  There were no old EKGs available for comparison. [TB]   2044 ECG 12 Lead ED Triage Standing Order; Weak / Dizzy / AMS [TB]      ED Course User Index  [TB] Javed Laguerre DO       The patient was seen and evaluated in the ED by me.  The above history and physical examination was performed as documented.  Diagnostic data was obtained.  Results reviewed.  Based on the patient's symptoms sound like he was having rigors with the dysuria.  Concern for urosepsis.  Patient was started IV fluids and given Rocephin empirically.  Urine culture and blood cultures are pending.  CT scan was performed.  I did review the CT findings with Dr. Pascual who is on-call for urology.  He will see the patient in consultation this morning.  He asked the patient be kept NPO.  Hospital service was consulted for primary admission.  The patient is agreeable to admission.    Sepsis criteria was met in the emergency department and the Sepsis protocol (including antibiotic administration) was initiated.      SIRS criteria considered:   1.  Temperature > 100.4 or <96.8    2.  Heart Rate > 90    3.  Respiratory Rate > 22    4.  WBC > 12K or <4K.             Severe Sepsis:     Respiratory: Mechanical Ventilation or Bipap  Hypotension: SBP > 90 or MAP < 65  Renal: Creatinine > 2  Metabolic: Lactic Acid > 2  Hematologic: Platelets < 100K or INR > 1.5  Hepatic: BILI  >  2  CNS: Sudden AMS     Septic Shock:     Severe Sepsis + Persistent hypotension or Lactic Acid > 4     Normal saline bolus, Antibiotics, and final disposition was based on these definitions.        Sepsis was recognized at 210    Antibiotics were ordered.     30 cc/kg bolus was indicated.      The patient was ordered 2802 mL of fluids.    Total Critical Care time of 35 minutes. Total critical  care time documented does not include time spent on separately billed procedures for services of nurses or physician assistants. I personally saw and examined the patient. I have reviewed all diagnostic interpretations and treatment plans as written. I was present for the key portions of any procedures performed and the inclusive time noted in any critical care statement. Critical care time includes patient management by me, time spent at the patients bedside,  time to review lab and imaging results, discussing patient care, documentation in the medical record, and time spent with family or caregiver.      Labs:    Lab Results (last 24 hours)       Procedure Component Value Units Date/Time    CBC & Differential [[id number]]  (Abnormal) Collected: 04/03/24 0108    Specimen: Blood Updated: 04/03/24 0113    Narrative:      The following orders were created for panel order CBC & Differential.  Procedure                               Abnormality         Status                     ---------                               -----------         ------                     CBC Auto Differential[[id number]]        Abnormal            Final result                 Please view results for these tests on the individual orders.    CBC Auto Differential [[id number]]  (Abnormal) Collected: 04/03/24 0108    Specimen: Blood Updated: 04/03/24 0113     WBC 2.74 10*3/mm3      RBC 3.96 10*6/mm3      Hemoglobin 12.6 g/dL      Hematocrit 38.3 %      MCV 96.7 fL      MCH 31.8 pg      MCHC 32.9 g/dL      RDW 12.5 %      RDW-SD 44.3 fl      MPV 10.4 fL      Platelets 182 10*3/mm3      Neutrophil % 79.6 %      Lymphocyte % 17.5 %      Monocyte % 1.1 %      Eosinophil % 0.7 %      Basophil % 0.4 %      Immature Grans % 0.7 %      Neutrophils, Absolute 2.18 10*3/mm3      Lymphocytes, Absolute 0.48 10*3/mm3      Monocytes, Absolute 0.03 10*3/mm3      Eosinophils, Absolute 0.02 10*3/mm3      Basophils, Absolute 0.01 10*3/mm3      Immature Grans, Absolute  0.02 10*3/mm3      nRBC 0.0 /100 WBC     Urinalysis With Microscopic If Indicated (No Culture) - Urine, Clean Catch [[id number]]  (Abnormal) Collected: 04/03/24 0125    Specimen: Urine, Clean Catch Updated: 04/03/24 0141     Color, UA Red     Appearance, UA Turbid     pH, UA --     Comment: Result not available due to interfering substances.        Specific Gravity, UA 1.015     Glucose, UA --     Comment: Result not available due to interfering substances.        Ketones, UA --     Comment: Result not available due to interfering substances.        Bilirubin, UA --     Comment: Result not available due to interfering substances.        Blood, UA --     Comment: Result not available due to interfering substances.        Protein, UA --     Comment: Result not available due to interfering substances.        Leuk Esterase, UA --     Comment: Result not available due to interfering substances.        Nitrite, UA --     Comment: Result not available due to interfering substances.        Urobilinogen, UA --     Comment: Result not available due to interfering substances.       Urinalysis, Microscopic Only - Urine, Clean Catch [[id number]]  (Abnormal) Collected: 04/03/24 0125    Specimen: Urine, Clean Catch Updated: 04/03/24 0151     RBC, UA Too Numerous to Count /HPF      WBC, UA 6-10 /HPF      Bacteria, UA None Seen /HPF      Squamous Epithelial Cells, UA 0-2 /HPF      Hyaline Casts, UA None Seen /LPF      Methodology Automated Microscopy    Urine Culture - Urine, Urine, Clean Catch [[id number]] Collected: 04/03/24 0125    Specimen: Urine, Clean Catch Updated: 04/03/24 0519    Comprehensive Metabolic Panel [[id number]]  (Abnormal) Collected: 04/03/24 0150    Specimen: Blood from Arm, Right Updated: 04/03/24 0222     Glucose 117 mg/dL      BUN 20 mg/dL      Creatinine 1.61 mg/dL      Sodium 138 mmol/L      Potassium 4.0 mmol/L      Chloride 99 mmol/L      CO2 23.7 mmol/L      Calcium 9.0 mg/dL      Total Protein 7.1 g/dL       "Albumin 3.9 g/dL      ALT (SGPT) 48 U/L      AST (SGOT) 39 U/L      Alkaline Phosphatase 83 U/L      Total Bilirubin 0.4 mg/dL      Globulin 3.2 gm/dL      A/G Ratio 1.2 g/dL      BUN/Creatinine Ratio 12.4     Anion Gap 15.3 mmol/L      eGFR 46.6 mL/min/1.73     Narrative:      GFR Normal >60  Chronic Kidney Disease <60  Kidney Failure <15      Single High Sensitivity Troponin T [[id number]]  (Abnormal) Collected: 04/03/24 0150    Specimen: Blood from Arm, Right Updated: 04/03/24 0222     HS Troponin T 43 ng/L     Narrative:      High Sensitive Troponin T Reference Range:  <14.0 ng/L- Negative Female for AMI  <22.0 ng/L- Negative Male for AMI  >=14 - Abnormal Female indicating possible myocardial injury.  >=22 - Abnormal Male indicating possible myocardial injury.   Clinicians would have to utilize clinical acumen, EKG, Troponin, and serial changes to determine if it is an Acute Myocardial Infarction or myocardial injury due to an underlying chronic condition.         Magnesium [[id number]]  (Abnormal) Collected: 04/03/24 0150    Specimen: Blood from Arm, Right Updated: 04/03/24 0222     Magnesium 1.5 mg/dL     Procalcitonin [[id number]]  (Abnormal) Collected: 04/03/24 0150    Specimen: Blood from Arm, Right Updated: 04/03/24 0258     Procalcitonin 2.59 ng/mL     Narrative:      As a Marker for Sepsis (Non-Neonates):    1. <0.5 ng/mL represents a low risk of severe sepsis and/or septic shock.  2. >2 ng/mL represents a high risk of severe sepsis and/or septic shock.    As a Marker for Lower Respiratory Tract Infections that require antibiotic therapy:    PCT on Admission    Antibiotic Therapy       6-12 Hrs later    >0.5                Strongly Recommended  >0.25 - <0.5        Recommended  0.1 - 0.25          Discouraged              Remeasure/reassess PCT  <0.1                Strongly Discouraged     Remeasure/reassess PCT    As 28 day mortality risk marker: \"Change in Procalcitonin Result\" (>80% or <=80%) if Day 0 (or " Day 1) and Day 4 values are available. Refer to [email or web address]    Change in PCT <=80%  A decrease of PCT levels below or equal to 80% defines a positive change in PCT test result representing a higher risk for 28-day all-cause mortality of patients diagnosed with severe sepsis for septic shock.    Change in PCT >80%  A decrease of PCT levels of more than 80% defines a negative change in PCT result representing a lower risk for 28-day all-cause mortality of patients diagnosed with severe sepsis or septic shock.    This test is Prognostic not Diagnostic, if elevated correlate with clinical findings before administering antibiotic treatment.        Lactic Acid, Plasma [[id number]]  (Abnormal) Collected: 04/03/24 0341    Specimen: Blood from Arm, Right Updated: 04/03/24 0423     Lactate 3.6 mmol/L     Blood Culture - Blood, Arm, Right [[id number]] Collected: 04/03/24 0341    Specimen: Blood from Arm, Right Updated: 04/03/24 0347    Blood Culture - Blood, Arm, Left [[id number]] Collected: 04/03/24 0451    Specimen: Blood from Arm, Left Updated: 04/03/24 0459    STAT Lactic Acid, Reflex [[id number]]  (Abnormal) Collected: 04/03/24 0828    Specimen: Blood from Arm, Right Updated: 04/03/24 0922     Lactate 4.2 mmol/L     STAT Lactic Acid, Reflex [[id number]]  (Abnormal) Collected: 04/03/24 0953    Specimen: Blood Updated: 04/03/24 1034     Lactate 4.4 mmol/L     CBC (No Diff) [[id number]]  (Abnormal) Collected: 04/03/24 1120    Specimen: Blood from Arm, Right Updated: 04/03/24 1144     WBC 24.57 10*3/mm3      RBC 3.28 10*6/mm3      Hemoglobin 10.2 g/dL      Hematocrit 32.3 %      MCV 98.5 fL      MCH 31.1 pg      MCHC 31.6 g/dL      RDW 12.5 %      RDW-SD 45.1 fl      MPV 9.9 fL      Platelets 147 10*3/mm3     STAT Lactic Acid, Reflex [[id number]]  (Abnormal) Collected: 04/03/24 1326    Specimen: Blood from Hand, Left Updated: 04/03/24 1409     Lactate 4.4 mmol/L     STAT Lactic Acid, Reflex [[id number]]   (Abnormal) Collected: 04/03/24 1933    Specimen: Blood from Arm, Right Updated: 04/03/24 2004     Lactate 3.1 mmol/L              Imaging:    US Renal Bilateral    Result Date: 4/3/2024  Examination: US RENAL BILATERAL-  Date of Exam: 4/3/2024 12:58 PM  Indication: Multiple left renal cysts, further characterization with ultrasound recommended; A41.9-Sepsis, unspecified organism; N39.0-Urinary tract infection, site not specified; R31.0-Gross hematuria.  Comparison: CT 4/3/2024  Technique: Grayscale and color Doppler ultrasound evaluation of the kidneys and urinary bladder was performed  Findings: The right kidney is surgically absent.  The left kidney measures 12.9 x 6.1 x 5.7 cm.  Numerous simple appearing cyst are noted on the left with the largest measuring 3.7 x 4.3 x 3.9 cm. There is no hydronephrosis.  The right renal fossa is grossly unremarkable on limited imaging.  Bladder is distended. Debris noted within the bladder. Question cystic structure at the base of the bladder, within the region of the prostate. This is better seen on prior CT.       1. Nonvisualization right kidney compatible with history of surgical resection  2. Simple cyst left kidney without evidence of hydronephrosis.  3. Cystic structure at the base of the bladder within the region of the prostate incompletely visualized. This is of indeterminate etiology as discussed on CT. This is better seen on prior CT comparison.   Electronically Signed By-Guille Cali On:4/3/2024 2:17 PM      CT Abdomen Pelvis With Contrast    Result Date: 4/3/2024  CT ABDOMEN PELVIS WITH CONTRAST  HISTORY: Fever and hematuria. Abdominal pain. History of kidney cancer.  COMPARISON: None available.  TECHNIQUE:  CT of Abdomen and Pelvis with IV contrast performed.  Sagittal and Coronal reconstructions performed. Radiation dose reduction techniques included automated exposure control or exposure modulation based on body size.  FINDINGS:  There is some fibrosis in  both lung bases. Fluid in the lower esophagus is compatible with reflux. The aorta is normal in caliber. Gallbladder appears normal. No biliary obstruction. There is a hemangioma in the inferior right hepatic lobe measuring about 2.2 cm. There is generalized hepatic steatosis there is an indeterminate left adrenal lesion measuring 2.4 cm. However, this is unchanged from a chest CT 1/19/2021 and felt to be a benign adenoma. The spleen, pancreas, and right adrenal gland appear normal. The right kidney is surgically absent. The nephrectomy bed is unremarkable. There are multiple left renal cysts. Additionally, there appear to be 3 hyperdense lesions, one measuring 1.5 cm in the lower pole, another measuring 2.1 cm in the posterior cortex, and a third measuring 2.7 cm in the posterior cortex in the mid to upper kidney. Nonemergent follow-up with renal ultrasound is recommended for further characterization. No left-sided hydronephrosis.  There is a fat-containing right inguinal hernia. Tiny appendix is normal. Large bowel is grossly normal with no evidence of colitis. Small bowel is normal with no obstruction identified. Stomach is normal except for a tiny hiatal hernia. No free fluid or adenopathy. No suspicious osseous lesions. There is a hypodense cystic-appearing lesion exerting mass effect on the bladder base that appears to be arising from the upper margin of the prostate gland. It measures approximately 3.0 x 2.3 x 2.8 cm. This could potentially reflect an abscess in the prostate gland or possibly a mass or other cystic lesion. This does not appear to represent a ureterocele. Additionally, there is some mild thickening of the urinary bladder wall, particularly posteriorly that may simply reflect cystitis. Urology consultation recommended.       1. Multiple left renal cysts with at least 3 hyperdense left renal lesions. These are not simple cysts, and further characterization with nonemergent ultrasound is  recommended as a starting point. No hydronephrosis. 2. Right nephrectomy within normal right nephrectomy bed. 3. Cystic lesion arising from the prostate gland exerting mass effect on the bladder base, nonspecific. Considerations would include an abscess, hypodense mass, or other cystic prostatic lesion. Additionally, there is some asymmetric bladder wall thickening, possibly indicative of cystitis. However, urology consultation is recommended for all of these findings. 4. Tiny hiatal hernia with gastroesophageal reflux. 5. Hepatic steatosis.   Electronically Signed By-Dr. Jesse Gonzalez MD On:4/3/2024 3:24 AM      XR Chest 1 View    Result Date: 4/3/2024  AP PORTABLE CHEST  HISTORY: Tachycardia.  COMPARISON: 1/20/2021  TECHNIQUE: AP portable chest x-ray.  FINDINGS: Heart size appears top normal. There is some mild atelectasis or scarring in the bases. Lungs are otherwise clear. No pneumothorax.      Mild scarring/atelectasis in the bases, otherwise no active disease.  Electronically Signed By-Dr. Jesse Gonzalez MD On:4/3/2024 1:22 AM         Differential Diagnosis and Discussion:    Hematuria: Differential diagnosis includes but is not limited to medications, coagulopathy, glomerulonephritis, nephritis, neoplasm, vascular abnormalities, cystitis, urethritis, neoplasms of the bladder, and autoimmune disorders.    All labs were reviewed and interpreted by me.  All X-rays impressions were independently interpreted by me.  EKG was interpreted by me.  CT scan radiology impression was interpreted by me.    MDM     Amount and/or Complexity of Data Reviewed  Clinical lab tests: reviewed  Tests in the radiology section of CPT®: reviewed  Tests in the medicine section of CPT®: reviewed         Critical Care Note: Total Critical Care time of 35 minutes. Total critical care time documented does not include time spent on separately billed procedures for services of nurses or physician assistants. I personally saw and examined  the patient. I have reviewed all diagnostic interpretations and treatment plans as written. I was present for the key portions of any procedures performed and the inclusive time noted in any critical care statement. Critical care time includes patient management by me, time spent at the patients bedside,  time to review lab and imaging results, discussing patient care, documentation in the medical record, and time spent with family or caregiver.    Patient Care Considerations:          Consultants/Shared Management Plan:    Hospitalist: I have discussed the case with Dr. Ghosh who agrees to accept the patient for admission.  Consultant: I have discussed the case with Dr. Pascual who agrees to consult on the patient.    Social Determinants of Health:    Patient is independent, reliable, and has access to care.       Disposition and Care Coordination:    Admit:   Through independent evaluation of the patient's history, physical, and imperical data, the patient meets criteria for inpatient admission to the hospital.        Final diagnoses:   Sepsis, due to unspecified organism, unspecified whether acute organ dysfunction present   Acute UTI   Gross hematuria        ED Disposition       ED Disposition   Decision to Admit    Condition   --    Comment   Level of Care: Telemetry [5]   Diagnosis: Hematuria [193641]   Admitting Physician: SAHARA GHOSH [[id number]]   Certification: I Certify That Inpatient Hospital Services Are Medically Necessary For Greater Than 2 Midnights                 This medical record created using voice recognition software.             Javed Laguerre DO  04/03/24 0547

## 2024-04-03 NOTE — CONSULTS
[facility]   Consult Note    Patient Name: Facundo Hinds  : 1955  MRN: [id number]  Primary Care Physician:  Ever Topete DO  Referring Physician: No ref. provider found  Date of admission: 4/3/2024    Consults  Subjective   Subjective     Reason for Consult/ Chief Complaint: Gross hematuria    History of Present Illness  Facundo Hinds is a 69 y.o. male patient comes to the emergency room last night with gross hematuria.  He states that he was urinating well but developed gross hematuria yesterday evening.  He states that 5 years ago he had a similar episode and at that time had cystoscopy and the whole workup for hematuria nothing was found.  He has had a history of right nephrectomy 20 years ago he states.  CT scan at this time shows unremarkable bladder a cyst on his prostate and left renal cysts.  He does have a urologist in [place] whom he sees regularly.  Last night upon admission his temperature was 100.9 blood pressure was 84/60 and pulse was 136.    Review of Systems   10 point review of systems is otherwise unremarkable  Personal History     Past Medical History:   Diagnosis Date    Arthritis     Cancer     Colon polyp     Deafness     EKG abnormalities 2017    Hemorrhoids     HTN (hypertension)     Kidney disorder     Night sweats     Renal insufficiency     Sinus trouble     SOB (shortness of breath)        Past Surgical History:   Procedure Laterality Date    COLONOSCOPY  ,     COLONOSCOPY N/A 2023    Procedure: COLONOSCOPY WITH POLYPECTOMIES, HOT SNARE;  Surgeon: Edward Arce MD;  Location: [facility];  Service: Gastroenterology;  Laterality: N/A;  COLON POLYPS, DIVERTICULOSIS, HEMORRHOIDS    KIDNEY SURGERY      SHOULDER SURGERY Left 2019    TESTICLE SURGERY         Family History: family history includes Arthritis in his mother; Cancer in his brother. Otherwise pertinent FHx was reviewed and not pertinent to current issue.    Social  History:  reports that he has never smoked. He has been exposed to tobacco smoke. He has never used smokeless tobacco. He reports that he does not drink alcohol and does not use drugs.    Home Medications:   acetaminophen, apixaban, atorvastatin, cetirizine, cholecalciferol, ferrous sulfate, fluticasone, lisinopril-hydrochlorothiazide, and multivitamin with minerals    Allergies:  No Known Allergies    Objective    Objective     Vitals:  Temp:  [99 °F (37.2 °C)-100.9 °F (38.3 °C)] 99 °F (37.2 °C)  Heart Rate:  [] 88  Resp:  [20-26] 20  BP: (84-98)/(59-62) 84/60    Physical Exam  Awake alert oriented x 3  Afebrile vital signs are stable at this time  Abdomen is soft nontender nondistended no rebound guarding or rigidity noted  Genitourinary exam: Normal penis bilateral descended testicles normal scrotum normal testicles.  Digital rectal exam reveals an enlarged prostate but the surface of the prostate is smooth.  Extremities no clubbing cyanosis or edema  Neurologically is intact  Cardiac regular rate and rhythm  Chest normal inspiration normal expiration without shortness of breath cough or pain.  Result Review    Result Review:  I have personally reviewed the results from the time of this admission to 4/3/2024 08:35 EDT and agree with these findings:  [x]  Laboratory list / accordion  []  Microbiology  [x]  Radiology  []  EKG/Telemetry   []  Cardiology/Vascular   []  Pathology  []  Old records  []  Other:  Most notable findings include: Unremarkable bladder on CT scan, cyst in the prostate multiple cysts in the left solitary kidney.      Assessment & Plan   Assessment / Plan     Brief Patient Summary:  Facundo Hinds is a 69 y.o. male who gross hematuria    Active Hospital Problems:  Active Hospital Problems    Diagnosis     **Hematuria     UTI (urinary tract infection), bacterial     History of nephrectomy, right     Kidney mass     History of pulmonary embolism     History of DVT (deep vein thrombosis)      Obesity due to excess calories     Stage 3a chronic kidney disease     Hypertension      Plan:   Given patient's fever hypotension tachycardia I believe that he has a urinary tract infection.  Given he is also on Eliquis as well he had significant gross hematuria.  But the CT scan did not show any clots or any lesions in the bladder.  My recommendation is to keep patient n.p.o. make sure he is urinating well has no complaints no problems and we can feed him later today.    Anamaria Pascual MD

## 2024-04-04 LAB
ANION GAP SERPL CALCULATED.3IONS-SCNC: 13.2 MMOL/L (ref 5–15)
BACTERIA BLD CULT: ABNORMAL
BOTTLE TYPE: ABNORMAL
BUN SERPL-MCNC: 21 MG/DL (ref 8–23)
BUN/CREAT SERPL: 14.4 (ref 7–25)
CALCIUM SPEC-SCNC: 8.2 MG/DL (ref 8.6–10.5)
CHLORIDE SERPL-SCNC: 102 MMOL/L (ref 98–107)
CO2 SERPL-SCNC: 22.8 MMOL/L (ref 22–29)
CREAT SERPL-MCNC: 1.46 MG/DL (ref 0.76–1.27)
D-LACTATE SERPL-SCNC: 2 MMOL/L (ref 0.5–2)
DEPRECATED RDW RBC AUTO: 46.5 FL (ref 37–54)
EGFRCR SERPLBLD CKD-EPI 2021: 52.4 ML/MIN/1.73
ERYTHROCYTE [DISTWIDTH] IN BLOOD BY AUTOMATED COUNT: 13 % (ref 12.3–15.4)
GLUCOSE SERPL-MCNC: 102 MG/DL (ref 65–99)
HCT VFR BLD AUTO: 30.5 % (ref 37.5–51)
HGB BLD-MCNC: 9.8 G/DL (ref 13–17.7)
MAGNESIUM SERPL-MCNC: 1.7 MG/DL (ref 1.6–2.4)
MCH RBC QN AUTO: 31.8 PG (ref 26.6–33)
MCHC RBC AUTO-ENTMCNC: 32.1 G/DL (ref 31.5–35.7)
MCV RBC AUTO: 99 FL (ref 79–97)
PLATELET # BLD AUTO: 146 10*3/MM3 (ref 140–450)
PMV BLD AUTO: 10.8 FL (ref 6–12)
POTASSIUM SERPL-SCNC: 4 MMOL/L (ref 3.5–5.2)
RBC # BLD AUTO: 3.08 10*6/MM3 (ref 4.14–5.8)
SODIUM SERPL-SCNC: 138 MMOL/L (ref 136–145)
WBC NRBC COR # BLD AUTO: 20.16 10*3/MM3 (ref 3.4–10.8)

## 2024-04-04 PROCEDURE — 25810000003 SODIUM CHLORIDE 0.9 % SOLUTION: Performed by: INTERNAL MEDICINE

## 2024-04-04 PROCEDURE — 25810000003 LACTATED RINGERS PER 1000 ML: Performed by: INTERNAL MEDICINE

## 2024-04-04 PROCEDURE — 83735 ASSAY OF MAGNESIUM: CPT | Performed by: INTERNAL MEDICINE

## 2024-04-04 PROCEDURE — 83605 ASSAY OF LACTIC ACID: CPT | Performed by: EMERGENCY MEDICINE

## 2024-04-04 PROCEDURE — 99232 SBSQ HOSP IP/OBS MODERATE 35: CPT | Performed by: UROLOGY

## 2024-04-04 PROCEDURE — 36415 COLL VENOUS BLD VENIPUNCTURE: CPT | Performed by: FAMILY MEDICINE

## 2024-04-04 PROCEDURE — 80048 BASIC METABOLIC PNL TOTAL CA: CPT | Performed by: FAMILY MEDICINE

## 2024-04-04 PROCEDURE — 87040 BLOOD CULTURE FOR BACTERIA: CPT | Performed by: INTERNAL MEDICINE

## 2024-04-04 PROCEDURE — 25010000002 VANCOMYCIN 5 G RECONSTITUTED SOLUTION: Performed by: INTERNAL MEDICINE

## 2024-04-04 PROCEDURE — 99232 SBSQ HOSP IP/OBS MODERATE 35: CPT | Performed by: INTERNAL MEDICINE

## 2024-04-04 PROCEDURE — 85027 COMPLETE CBC AUTOMATED: CPT | Performed by: FAMILY MEDICINE

## 2024-04-04 PROCEDURE — 25010000002 CEFTRIAXONE PER 250 MG: Performed by: FAMILY MEDICINE

## 2024-04-04 RX ADMIN — SODIUM CHLORIDE, POTASSIUM CHLORIDE, SODIUM LACTATE AND CALCIUM CHLORIDE 125 ML/HR: 600; 310; 30; 20 INJECTION, SOLUTION INTRAVENOUS at 21:15

## 2024-04-04 RX ADMIN — Medication 10 ML: at 09:39

## 2024-04-04 RX ADMIN — VANCOMYCIN HYDROCHLORIDE 2500 MG: 5 INJECTION, POWDER, LYOPHILIZED, FOR SOLUTION INTRAVENOUS at 10:25

## 2024-04-04 RX ADMIN — ACETAMINOPHEN 650 MG: 325 TABLET ORAL at 00:15

## 2024-04-04 RX ADMIN — SODIUM CHLORIDE, POTASSIUM CHLORIDE, SODIUM LACTATE AND CALCIUM CHLORIDE 125 ML/HR: 600; 310; 30; 20 INJECTION, SOLUTION INTRAVENOUS at 00:09

## 2024-04-04 RX ADMIN — ACETAMINOPHEN 650 MG: 325 TABLET ORAL at 21:16

## 2024-04-04 RX ADMIN — CEFTRIAXONE SODIUM 1000 MG: 1 INJECTION, POWDER, FOR SOLUTION INTRAMUSCULAR; INTRAVENOUS at 05:25

## 2024-04-04 NOTE — PROGRESS NOTES
[facility]     Progress Note    Patient Name: Facundo Hinds  : 1955  MRN: [id number]  Primary Care Physician:  Ever Topete DO  Date of admission: 4/3/2024    Subjective   Subjective     Chief Complaint: Gross hematuria    Blood in Urine      Patient Reports patient came into the hospital because of gross hematuria.  He has a history of right nephrectomy in the past.  He is on Eliquis normally.  He started having significant gross hematuria never had any retention and was admitted to the hospital for further evaluation and treatment.  He is currently on IV antibiotics.  Today his urine is beginning to clear up very little blood in it.    Review of Systems   Genitourinary:  Positive for hematuria.       Objective   Objective     Vitals:   Temp:  [98.6 °F (37 °C)-100.1 °F (37.8 °C)] 99.2 °F (37.3 °C)  Heart Rate:  [74-82] 78  Resp:  [18] 18  BP: ()/(62-69) 97/65    Physical Exam   Awake alert oriented x 3  Abdomen is soft nontender nondistended no rebound guarding or rigidity noted  Bladder is not palpable no CVA tenderness  Extremities no clubbing cyanosis or edema noted  Result Review    Result Review:  I have personally reviewed the results from the time of this admission to 2024 09:13 EDT and agree with these findings:  [x]  Laboratory list / accordion  []  Microbiology  []  Radiology  []  EKG/Telemetry   []  Cardiology/Vascular   []  Pathology  []  Old records  []  Other:  Most notable findings include: This creatinine at the time of admission was 1.61 it is now down to 1.46.  His white blood cell count yesterday was 24.57 it is now 20.16.      Assessment & Plan   Assessment / Plan     Brief Patient Summary:  Facundo Hinds is a 69 y.o. male who admitted with gross hematuria IV antibiotics initiated and now the gross hematuria is resolving he is off of the Eliquis at this point.    Active Hospital Problems:  Active Hospital Problems    Diagnosis     **Hematuria     UTI (urinary  tract infection), bacterial     History of nephrectomy, right     Kidney mass     History of pulmonary embolism     History of DVT (deep vein thrombosis)     Obesity due to excess calories     Stage 3a chronic kidney disease     Hypertension      Plan:   He is doing better his urine is beginning to clear he has no pain in the pelvis and the abdomen.  He does have a prostatic cyst but it is asymptomatic at this point.  I recommendations continue with IV antibiotics.  I told him to follow-up with his urologist after being discharged from this hospital for cystoscopy he understands and agrees to do that.    DVT prophylaxis:  Medical and mechanical DVT prophylaxis orders are present.        CODE STATUS:    Code Status (Patient has no pulse and is not breathing): CPR (Attempt to Resuscitate)  Medical Interventions (Patient has pulse or is breathing): Full Support    Disposition:  I expect patient to be discharged home.    Anamaria Pascual MD

## 2024-04-04 NOTE — PROGRESS NOTES
[facility]   Hospitalist Progress Note  Date: 2024  Patient Name: Facundo Hinds  : 1955  MRN: [id number]  Date of admission: 4/3/2024  Room/Bed: [facility]      Subjective   Subjective     Chief Complaint:   Hematuria    Summary:  Facundo Hinds is a 69 y.o. male with medical history of hypertension, PE on Eliquis, renal cell carcinoma, CKD, AGUSTÍN, and GERD presented to the ED with hematuria.  Patient started with hematuria around 8 PM on the evening of 2024.  Patient endorses associated dysuria, subjective fevers chills diaphoresis and rigors.  Patient with a history of renal cell carcinoma, similar episode 4 years ago while in [place].  Workup included cystoscopy with no significant findings. In the ED patient was febrile and tachycardic on arrival with remaining vitals being within normal limits.  Labs showed that he had elevated lactic acid level with leukopenia, elevated procalcitonin, hypomagnesemia, mild anemia, and gross hematuria on urinalysis.  CT of the abdomen showed multiple left renal cyst with hyperdense lesions, Cystic lesion at arising from the prostate gland with mass effect on the bladder base along with asymmetric bladder wall thickening.  Urology was consulted in the emergency department.    Interval Followup:   Afebrile.  Patient's lactate is trended down to normal.  Still with soft blood pressures.  Patient with minimal to no hematuria.  However now with bleeding hemorrhoids.  Patient's blood cultures have turned back positive 2 out of 2 for gram-positive cocci, adding vancomycin    Objective   Objective     Vitals:   Temp:  [98.6 °F (37 °C)-100.1 °F (37.8 °C)] 99.2 °F (37.3 °C)  Heart Rate:  [74-82] 74  Resp:  [16-18] 16  BP: ()/(62-69) 94/62    Physical Exam               Constitutional: Awake, alert              Eyes: PERRLA, sclerae anicteric, no conjunctival injection              HENT: NCAT, mucous membranes moist              Neck: Supple, no thyromegaly, no  lymphadenopathy, trachea midline              Respiratory: Clear to auscultation bilaterally, nonlabored respirations               Cardiovascular: RRR, no murmurs, rubs, or gallops, palpable pedal pulses bilaterally              Gastrointestinal: Positive bowel sounds, soft, nontender, nondistended              Musculoskeletal: No bilateral ankle edema, no clubbing or cyanosis to extremities              Neurologic: Oriented x 3, strength symmetric in all extremities, Cranial Nerves grossly intact to confrontation, speech clear    Result Review    Result Review:  I have personally reviewed these results:  [x]  Laboratory      Lab 04/04/24  0325 04/04/24  0144 04/03/24  1933 04/03/24  1326 04/03/24  1120 04/03/24  0341 04/03/24  0150 04/03/24  0108   WBC 20.16*  --   --   --  24.57*  --   --  2.74*   HEMOGLOBIN 9.8*  --   --   --  10.2*  --   --  12.6*   HEMATOCRIT 30.5*  --   --   --  32.3*  --   --  38.3   PLATELETS 146  --   --   --  147  --   --  182   NEUTROS ABS  --   --   --   --   --   --   --  2.18   IMMATURE GRANS (ABS)  --   --   --   --   --   --   --  0.02   LYMPHS ABS  --   --   --   --   --   --   --  0.48*   MONOS ABS  --   --   --   --   --   --   --  0.03*   EOS ABS  --   --   --   --   --   --   --  0.02   MCV 99.0*  --   --   --  98.5*  --   --  96.7   PROCALCITONIN  --   --   --   --   --   --  2.59*  --    LACTATE  --  2.0 3.1* 4.4*  --    < >  --   --     < > = values in this interval not displayed.         Lab 04/04/24 0325 04/03/24  0150   SODIUM 138 138   POTASSIUM 4.0 4.0   CHLORIDE 102 99   CO2 22.8 23.7   ANION GAP 13.2 15.3*   BUN 21 20   CREATININE 1.46* 1.61*   EGFR 52.4* 46.6*   GLUCOSE 102* 117*   CALCIUM 8.2* 9.0   MAGNESIUM 1.7 1.5*         Lab 04/03/24  0150   TOTAL PROTEIN 7.1   ALBUMIN 3.9   GLOBULIN 3.2   ALT (SGPT) 48*   AST (SGOT) 39   BILIRUBIN 0.4   ALK PHOS 83         Lab 04/03/24  0150   HSTROP T 43*                 Brief Urine Lab Results  (Last result in the past 365  days)        Color   Clarity   Blood   Leuk Est   Nitrite   Protein   CREAT   Urine HCG        04/03/24 0125 Red   Turbid   --  Comment: Result not available due to interfering substances.   --  Comment: Result not available due to interfering substances.   --  Comment: Result not available due to interfering substances.   --  Comment: Result not available due to interfering substances.                 [x]  Microbiology   Microbiology Results (last 10 days)       Procedure Component Value - Date/Time    Blood Culture - Blood, Arm, Left [[id number]]  (Abnormal) Collected: 04/03/24 0451    Lab Status: Preliminary result Specimen: Blood from Arm, Left Updated: 04/04/24 0755     Blood Culture Abnormal Stain     Gram Stain Aerobic Bottle Gram positive cocci in clusters    Blood Culture - Blood, Arm, Right [[id number]]  (Abnormal) Collected: 04/03/24 0341    Lab Status: Preliminary result Specimen: Blood from Arm, Right Updated: 04/04/24 0754     Blood Culture Abnormal Stain     Gram Stain Aerobic Bottle Gram positive cocci in clusters      Anaerobic Bottle Gram positive cocci in clusters    Blood Culture ID, PCR - Blood, Arm, Right [[id number]]  (Abnormal) Collected: 04/03/24 0341    Lab Status: Final result Specimen: Blood from Arm, Right Updated: 04/04/24 0935     BCID, PCR Staph spp, not aureus or lugdunensis. Identification by BCID2 PCR.     BOTTLE TYPE Aerobic Bottle          [x]  Radiology  US Renal Bilateral    Result Date: 4/3/2024   1. Nonvisualization right kidney compatible with history of surgical resection  2. Simple cyst left kidney without evidence of hydronephrosis.  3. Cystic structure at the base of the bladder within the region of the prostate incompletely visualized. This is of indeterminate etiology as discussed on CT. This is better seen on prior CT comparison.   Electronically Signed By-Guille Cali On:4/3/2024 2:17 PM      CT Abdomen Pelvis With Contrast    Result Date: 4/3/2024   1. Multiple  left renal cysts with at least 3 hyperdense left renal lesions. These are not simple cysts, and further characterization with nonemergent ultrasound is recommended as a starting point. No hydronephrosis. 2. Right nephrectomy within normal right nephrectomy bed. 3. Cystic lesion arising from the prostate gland exerting mass effect on the bladder base, nonspecific. Considerations would include an abscess, hypodense mass, or other cystic prostatic lesion. Additionally, there is some asymmetric bladder wall thickening, possibly indicative of cystitis. However, urology consultation is recommended for all of these findings. 4. Tiny hiatal hernia with gastroesophageal reflux. 5. Hepatic steatosis.   Electronically Signed By-Dr. Jesse Gonzalez MD On:4/3/2024 3:24 AM      XR Chest 1 View    Result Date: 4/3/2024  Mild scarring/atelectasis in the bases, otherwise no active disease.  Electronically Signed By-Dr. Jesse Gonzalez MD On:4/3/2024 1:22 AM     []  EKG/Telemetry   []  Cardiology/Vascular   []  Pathology  []  Old records  []  Other:    Assessment & Plan   Assessment / Plan     Assessment:  Severe sepsis due to urinary tract infection.  Fever, tachycardia, lactic acidosis  Hematuria  Bacteremia  Lactic acidosis  History of hypertension  History of pulmonary embolus on Eliquis  CKD  AGUSTÍN  History of renal cell carcinoma    Plan:  Patient remains admitted to hospital for further care and management  Urology consulted, appreciate assistance  Continue to monitor urinary output closely, has not been urinating any clots, always concern for retention secondary to clots  Continues on ceftriaxone at this time will adjust antibiotics if needed  Added on vancomycin today, blood cultures 2 out of 2 positive gram-positive cocci  Repeating blood cultures  Eliquis currently on hold  Will continue IV fluids for now, lactate is trended to normal  Continue diet  Continue to hold home antihypertensives     Discussed with RN,  urologist    DVT prophylaxis:  Medical and mechanical DVT prophylaxis orders are present.        CODE STATUS:   Code Status (Patient has no pulse and is not breathing): CPR (Attempt to Resuscitate)  Medical Interventions (Patient has pulse or is breathing): Full Support      Electronically signed by Uriel Mac MD, 4/4/2024, 11:34 EDT.

## 2024-04-04 NOTE — PROGRESS NOTES
"[facility]: Vancomycin Pharmacokinetic Initial Consult Note    Facundo Hinds is a 69 y.o. male who is on day 1 of pharmacy to dose vancomycin for Bacteremia.    Consult Information  Consulting Provider: Uriel Mac MD  Planned Duration of Therapy: 14 days (through )  Was Patient Receiving Prior to Admission/Consult?: No  Loading Dose Ordered or Given: 2500 mg ordered on  at 1000  PK/PD Target: -600 mg/L.hr     Other Antimicrobials: Ceftriaxone    Imaging Reviewed?: Yes    Microbiology Data  MRSA PCR performed: No; Result: Not ordered due to excluded indication or presence of suspected abscess  Culture/Source:   4/3 Urine cx: in process  4/3 Blood cx: gram positive cocci in clusters   Blood cx: to be collected    Vitals/Labs  Ht: 177.8 cm (70\"); Wt: 124 kg (272 lb 4.3 oz)  Temp (24hrs), Av.3 °F (37.4 °C), Min:98.6 °F (37 °C), Max:100.1 °F (37.8 °C)   Estimated Creatinine Clearance: 64.9 mL/min (A) (by C-G formula based on SCr of 1.46 mg/dL (H)).     Results from last 7 days   Lab Units 24  0325 24  1120 24  0150 24  0108   CREATININE mg/dL 1.46*  --  1.61*  --    WBC 10*3/mm3 20.16* 24.57*  --  2.74*     Assessment/Plan:    Vancomycin Dose:  1750 mg IV every 24 hours; which provides the following predicted parameters:  AUC24,ss: 502 mg/L.hr  PAUC*: 74 %  Ctrough,ss: 15.1 mg/L  Pconc*: 27 %  Tox.: 10 %  Vanc Random ordered for  at AM labs  Patient has order for Basic Metabolic Panel    Pharmacy will follow patient's kidney function and will adjust doses and obtain levels as necessary. Thank you for involving pharmacy in this patient's care. Please contact pharmacy with any questions or concerns.                           Mary Laguerre [facility]  Clinical Pharmacist    "

## 2024-04-05 ENCOUNTER — APPOINTMENT (OUTPATIENT)
Dept: CARDIOLOGY | Facility: HOSPITAL | Age: 69
DRG: 872 | End: 2024-04-05
Payer: MEDICARE

## 2024-04-05 LAB
ANION GAP SERPL CALCULATED.3IONS-SCNC: 10.7 MMOL/L (ref 5–15)
BACTERIA SPEC AEROBE CULT: ABNORMAL
BH CV ECHO MEAS - AO ROOT DIAM: 3.9 CM
BH CV ECHO MEAS - EDV(CUBED): 175.6 ML
BH CV ECHO MEAS - EDV(MOD-SP2): 161 ML
BH CV ECHO MEAS - EDV(MOD-SP4): 166 ML
BH CV ECHO MEAS - EF(MOD-BP): 62.7 %
BH CV ECHO MEAS - EF(MOD-SP2): 61.6 %
BH CV ECHO MEAS - EF(MOD-SP4): 67.3 %
BH CV ECHO MEAS - ESV(CUBED): 60.7 ML
BH CV ECHO MEAS - ESV(MOD-SP2): 61.9 ML
BH CV ECHO MEAS - ESV(MOD-SP4): 54.3 ML
BH CV ECHO MEAS - FS: 29.8 %
BH CV ECHO MEAS - IVS/LVPW: 0.89 CM
BH CV ECHO MEAS - IVSD: 0.97 CM
BH CV ECHO MEAS - LA DIMENSION: 4.1 CM
BH CV ECHO MEAS - LAT PEAK E' VEL: 7.5 CM/SEC
BH CV ECHO MEAS - LV DIASTOLIC VOL/BSA (35-75): 69.8 CM2
BH CV ECHO MEAS - LV MASS(C)D: 228.7 GRAMS
BH CV ECHO MEAS - LV SYSTOLIC VOL/BSA (12-30): 22.8 CM2
BH CV ECHO MEAS - LVIDD: 5.6 CM
BH CV ECHO MEAS - LVIDS: 3.9 CM
BH CV ECHO MEAS - LVOT AREA: 4.2 CM2
BH CV ECHO MEAS - LVOT DIAM: 2.3 CM
BH CV ECHO MEAS - LVPWD: 1.09 CM
BH CV ECHO MEAS - MED PEAK E' VEL: 9.6 CM/SEC
BH CV ECHO MEAS - MV A MAX VEL: 72.7 CM/SEC
BH CV ECHO MEAS - MV DEC SLOPE: 636 CM/SEC2
BH CV ECHO MEAS - MV DEC TIME: 0.15 SEC
BH CV ECHO MEAS - MV E MAX VEL: 97.4 CM/SEC
BH CV ECHO MEAS - MV E/A: 1.34
BH CV ECHO MEAS - RVDD: 3.1 CM
BH CV ECHO MEAS - SI(MOD-SP2): 41.7 ML/M2
BH CV ECHO MEAS - SI(MOD-SP4): 47 ML/M2
BH CV ECHO MEAS - SV(MOD-SP2): 99.1 ML
BH CV ECHO MEAS - SV(MOD-SP4): 111.7 ML
BH CV ECHO MEAS - TAPSE (>1.6): 1.98 CM
BH CV ECHO MEASUREMENTS AVERAGE E/E' RATIO: 11.39
BUN SERPL-MCNC: 14 MG/DL (ref 8–23)
BUN/CREAT SERPL: 12 (ref 7–25)
CALCIUM SPEC-SCNC: 8.3 MG/DL (ref 8.6–10.5)
CHLORIDE SERPL-SCNC: 104 MMOL/L (ref 98–107)
CO2 SERPL-SCNC: 24.3 MMOL/L (ref 22–29)
CREAT SERPL-MCNC: 1.17 MG/DL (ref 0.76–1.27)
DEPRECATED RDW RBC AUTO: 46.6 FL (ref 37–54)
EGFRCR SERPLBLD CKD-EPI 2021: 68.3 ML/MIN/1.73
ERYTHROCYTE [DISTWIDTH] IN BLOOD BY AUTOMATED COUNT: 13 % (ref 12.3–15.4)
GLUCOSE SERPL-MCNC: 111 MG/DL (ref 65–99)
HCT VFR BLD AUTO: 28.7 % (ref 37.5–51)
HGB BLD-MCNC: 9.2 G/DL (ref 13–17.7)
LEFT ATRIUM VOLUME INDEX: 17.3 ML/M2
MAGNESIUM SERPL-MCNC: 1.7 MG/DL (ref 1.6–2.4)
MCH RBC QN AUTO: 31.7 PG (ref 26.6–33)
MCHC RBC AUTO-ENTMCNC: 32.1 G/DL (ref 31.5–35.7)
MCV RBC AUTO: 99 FL (ref 79–97)
PLATELET # BLD AUTO: 140 10*3/MM3 (ref 140–450)
PMV BLD AUTO: 10.2 FL (ref 6–12)
POTASSIUM SERPL-SCNC: 3.8 MMOL/L (ref 3.5–5.2)
RBC # BLD AUTO: 2.9 10*6/MM3 (ref 4.14–5.8)
SODIUM SERPL-SCNC: 139 MMOL/L (ref 136–145)
VANCOMYCIN SERPL-MCNC: 12.3 MCG/ML (ref 5–40)
WBC NRBC COR # BLD AUTO: 13.75 10*3/MM3 (ref 3.4–10.8)

## 2024-04-05 PROCEDURE — 25010000002 SULFUR HEXAFLUORIDE MICROSPH 60.7-25 MG RECONSTITUTED SUSPENSION: Performed by: INTERNAL MEDICINE

## 2024-04-05 PROCEDURE — 25010000002 CEFTRIAXONE PER 250 MG: Performed by: FAMILY MEDICINE

## 2024-04-05 PROCEDURE — 99232 SBSQ HOSP IP/OBS MODERATE 35: CPT | Performed by: INTERNAL MEDICINE

## 2024-04-05 PROCEDURE — 80048 BASIC METABOLIC PNL TOTAL CA: CPT | Performed by: FAMILY MEDICINE

## 2024-04-05 PROCEDURE — 83735 ASSAY OF MAGNESIUM: CPT | Performed by: INTERNAL MEDICINE

## 2024-04-05 PROCEDURE — 93306 TTE W/DOPPLER COMPLETE: CPT

## 2024-04-05 PROCEDURE — 85027 COMPLETE CBC AUTOMATED: CPT | Performed by: FAMILY MEDICINE

## 2024-04-05 PROCEDURE — 80202 ASSAY OF VANCOMYCIN: CPT | Performed by: INTERNAL MEDICINE

## 2024-04-05 PROCEDURE — 99232 SBSQ HOSP IP/OBS MODERATE 35: CPT | Performed by: UROLOGY

## 2024-04-05 PROCEDURE — 36415 COLL VENOUS BLD VENIPUNCTURE: CPT | Performed by: FAMILY MEDICINE

## 2024-04-05 RX ORDER — FERROUS SULFATE 325(65) MG
325 TABLET ORAL DAILY
Status: DISCONTINUED | OUTPATIENT
Start: 2024-04-06 | End: 2024-04-08 | Stop reason: HOSPADM

## 2024-04-05 RX ORDER — CETIRIZINE HYDROCHLORIDE 10 MG/1
10 TABLET ORAL DAILY
Status: DISCONTINUED | OUTPATIENT
Start: 2024-04-06 | End: 2024-04-08 | Stop reason: HOSPADM

## 2024-04-05 RX ORDER — FLUTICASONE PROPIONATE 50 MCG
2 SPRAY, SUSPENSION (ML) NASAL DAILY
Status: DISCONTINUED | OUTPATIENT
Start: 2024-04-05 | End: 2024-04-08 | Stop reason: HOSPADM

## 2024-04-05 RX ORDER — BUPIVACAINE HCL/0.9 % NACL/PF 0.1 %
2000 PLASTIC BAG, INJECTION (ML) EPIDURAL EVERY 6 HOURS SCHEDULED
Status: DISCONTINUED | OUTPATIENT
Start: 2024-04-06 | End: 2024-04-07

## 2024-04-05 RX ORDER — BENZONATATE 100 MG/1
100 CAPSULE ORAL 3 TIMES DAILY PRN
Status: DISCONTINUED | OUTPATIENT
Start: 2024-04-05 | End: 2024-04-08 | Stop reason: HOSPADM

## 2024-04-05 RX ORDER — ATORVASTATIN CALCIUM 10 MG/1
10 TABLET, FILM COATED ORAL DAILY
Status: DISCONTINUED | OUTPATIENT
Start: 2024-04-06 | End: 2024-04-08 | Stop reason: HOSPADM

## 2024-04-05 RX ADMIN — SULFUR HEXAFLUORIDE 2 ML: KIT at 11:40

## 2024-04-05 RX ADMIN — ACETAMINOPHEN 650 MG: 325 TABLET ORAL at 21:55

## 2024-04-05 RX ADMIN — FLUTICASONE PROPIONATE 2 SPRAY: 50 SPRAY, METERED NASAL at 17:02

## 2024-04-05 RX ADMIN — BENZONATATE 100 MG: 100 CAPSULE ORAL at 17:02

## 2024-04-05 RX ADMIN — Medication 10 ML: at 10:07

## 2024-04-05 RX ADMIN — APIXABAN 5 MG: 5 TABLET, FILM COATED ORAL at 21:55

## 2024-04-05 RX ADMIN — CEFTRIAXONE SODIUM 2000 MG: 2 INJECTION, POWDER, FOR SOLUTION INTRAMUSCULAR; INTRAVENOUS at 05:20

## 2024-04-05 RX ADMIN — Medication 10 ML: at 21:55

## 2024-04-05 RX ADMIN — ACETAMINOPHEN 650 MG: 325 TABLET ORAL at 12:13

## 2024-04-05 NOTE — PROGRESS NOTES
[facility]     Progress Note    Patient Name: Facundo Hinds  : 1955  MRN: [id number]  Primary Care Physician:  Ever Topete DO  Date of admission: 4/3/2024    Subjective   Subjective     Chief Complaint: Gross hematuria    Blood in Urine      Patient Reports patient was on Eliquis and he came to the emergency room 2 days ago with gross hematuria, dysuria frequency and urgency.  He was admitted to the hospital IV antibiotics Rocephin was started and patient is gradually improved.  He still has some blood in his urine but there is also clear urine in the toilet bowl as well.  He states he has a fairly good flow and has no complaints urinating.    Review of Systems   Genitourinary:  Positive for hematuria.       Objective   Objective     Vitals:   Temp:  [97.5 °F (36.4 °C)-99.3 °F (37.4 °C)] 99.3 °F (37.4 °C)  Heart Rate:  [68-75] 72  Resp:  [16-20] 20  BP: ()/(62-76) 128/75    Physical Exam   Awake alert oriented  Afebrile vital signs are stable  Abdomen is soft nontender nondistended no rebound guarding or rigidity noted  Result Review    Result Review:  I have personally reviewed the results from the time of this admission to 2024 08:53 EDT and agree with these findings:  [x]  Laboratory list / accordion  []  Microbiology  []  Radiology  []  EKG/Telemetry   []  Cardiology/Vascular   []  Pathology  []  Old records  []  Other:  Most notable findings include: Creatinine is 1.17 down from 1.46 white blood cell count at 1 point was 24.57 it is now down to 13.75.  Hemoglobin and hematocrit were 9.2 and 28.7 it is down from 10.2 32.32 days ago      Assessment & Plan   Assessment / Plan     Brief Patient Summary:  Facundo Hinds is a 69 y.o. male who gross hematuria    Active Hospital Problems:  Active Hospital Problems    Diagnosis     **Hematuria     UTI (urinary tract infection), bacterial     History of nephrectomy, right     Kidney mass     History of pulmonary embolism     History of  DVT (deep vein thrombosis)     Obesity due to excess calories     Stage 3a chronic kidney disease     Hypertension      Plan:   Patient was admitted to the hospital because of gross hematuria.  His urine showed only 6-10 white blood cells but he had a high white blood cell count of 24,000 and he was febrile.  This all points towards a urinary tract infection complicated by patient being on Eliquis and he bladder afterwards he still has some blood but it has improved we will monitor his hemoglobin hematocrit and white blood cell count.  For now we will continue with IV antibiotics Rocephin daily 2 g.    DVT prophylaxis:  Medical and mechanical DVT prophylaxis orders are present.        CODE STATUS:    Code Status (Patient has no pulse and is not breathing): CPR (Attempt to Resuscitate)  Medical Interventions (Patient has pulse or is breathing): Full Support    Disposition:  I expect patient to be discharged home.    Anamaria Pascual MD

## 2024-04-05 NOTE — PROGRESS NOTES
[facility]   Hospitalist Progress Note  Date: 2024  Patient Name: Facundo Hinds  : 1955  MRN: [id number]  Date of admission: 4/3/2024  Room/Bed: [facility]      Subjective   Subjective     Chief Complaint:   Hematuria    Summary:  Facundo Hinds is a 69 y.o. male with medical history of hypertension, PE on Eliquis, renal cell carcinoma, CKD, AGUSTÍN, and GERD presented to the ED with hematuria.  Patient started with hematuria around 8 PM on the evening of 2024.  Patient endorses associated dysuria, subjective fevers chills diaphoresis and rigors.  Patient with a history of renal cell carcinoma, similar episode 4 years ago while in [place].  Workup included cystoscopy with no significant findings. In the ED patient was febrile and tachycardic on arrival with remaining vitals being within normal limits.  Labs showed that he had elevated lactic acid level with leukopenia, elevated procalcitonin, hypomagnesemia, mild anemia, and gross hematuria on urinalysis.  CT of the abdomen showed multiple left renal cyst with hyperdense lesions, Cystic lesion at arising from the prostate gland with mass effect on the bladder base along with asymmetric bladder wall thickening.  Urology was consulted in the emergency department.    Interval Followup:   Patient's blood pressures have improved over the past 24 hours.  Continue to hold antihypertensives at this time.  Antibiotics have been narrowed down to cefazolin.  Will resume patient on home Eliquis today, Continue to monitor for hematuria    Objective   Objective     Vitals:   Temp:  [97.5 °F (36.4 °C)-99.3 °F (37.4 °C)] 98.6 °F (37 °C)  Heart Rate:  [69-75] 74  Resp:  [16-20] 20  BP: (116-137)/(64-76) 129/65    Physical Exam               Constitutional: Awake, alert              Eyes: PERRLA, sclerae anicteric, no conjunctival injection              HENT: NCAT, mucous membranes moist              Neck: Supple, no thyromegaly, no lymphadenopathy, trachea  midline              Respiratory: Clear to auscultation bilaterally, nonlabored respirations               Cardiovascular: RRR, no murmurs, rubs, or gallops, palpable pedal pulses bilaterally              Gastrointestinal: Positive bowel sounds, soft, nontender, nondistended              Musculoskeletal: No bilateral ankle edema, no clubbing or cyanosis to extremities              Neurologic: Oriented x 3, strength symmetric in all extremities, Cranial Nerves grossly intact to confrontation, speech clear    Result Review    Result Review:  I have personally reviewed these results:  [x]  Laboratory      Lab 04/05/24 0448 04/04/24 0325 04/04/24  0144 04/03/24  1933 04/03/24  1326 04/03/24  1120 04/03/24  0341 04/03/24  0150 04/03/24  0108   WBC 13.75* 20.16*  --   --   --  24.57*  --   --  2.74*   HEMOGLOBIN 9.2* 9.8*  --   --   --  10.2*  --   --  12.6*   HEMATOCRIT 28.7* 30.5*  --   --   --  32.3*  --   --  38.3   PLATELETS 140 146  --   --   --  147  --   --  182   NEUTROS ABS  --   --   --   --   --   --   --   --  2.18   IMMATURE GRANS (ABS)  --   --   --   --   --   --   --   --  0.02   LYMPHS ABS  --   --   --   --   --   --   --   --  0.48*   MONOS ABS  --   --   --   --   --   --   --   --  0.03*   EOS ABS  --   --   --   --   --   --   --   --  0.02   MCV 99.0* 99.0*  --   --   --  98.5*  --   --  96.7   PROCALCITONIN  --   --   --   --   --   --   --  2.59*  --    LACTATE  --   --  2.0 3.1* 4.4*  --    < >  --   --     < > = values in this interval not displayed.         Lab 04/05/24 0448 04/04/24  0325 04/03/24  0150   SODIUM 139 138 138   POTASSIUM 3.8 4.0 4.0   CHLORIDE 104 102 99   CO2 24.3 22.8 23.7   ANION GAP 10.7 13.2 15.3*   BUN 14 21 20   CREATININE 1.17 1.46* 1.61*   EGFR 68.3 52.4* 46.6*   GLUCOSE 111* 102* 117*   CALCIUM 8.3* 8.2* 9.0   MAGNESIUM 1.7 1.7 1.5*         Lab 04/03/24  0150   TOTAL PROTEIN 7.1   ALBUMIN 3.9   GLOBULIN 3.2   ALT (SGPT) 48*   AST (SGOT) 39   BILIRUBIN 0.4   ALK  PHOS 83         Lab 04/03/24  0150   HSTROP T 43*                 Brief Urine Lab Results  (Last result in the past 365 days)        Color   Clarity   Blood   Leuk Est   Nitrite   Protein   CREAT   Urine HCG        04/03/24 0125 Red   Turbid   --  Comment: Result not available due to interfering substances.   --  Comment: Result not available due to interfering substances.   --  Comment: Result not available due to interfering substances.   --  Comment: Result not available due to interfering substances.                 [x]  Microbiology   Microbiology Results (last 10 days)       Procedure Component Value - Date/Time    Blood Culture - Blood, Hand, Right [[id number]]  (Normal) Collected: 04/04/24 1002    Lab Status: Preliminary result Specimen: Blood from Hand, Right Updated: 04/05/24 1030     Blood Culture No growth at 24 hours    Blood Culture - Blood, Arm, Right [[id number]]  (Normal) Collected: 04/04/24 1002    Lab Status: Preliminary result Specimen: Blood from Arm, Right Updated: 04/05/24 1030     Blood Culture No growth at 24 hours    Blood Culture - Blood, Arm, Left [[id number]]  (Abnormal) Collected: 04/03/24 0451    Lab Status: Preliminary result Specimen: Blood from Arm, Left Updated: 04/05/24 0613     Blood Culture Staphylococcus, coagulase negative     Isolated from Aerobic and Anaerobic Bottles     Gram Stain Aerobic Bottle Gram positive cocci in clusters      Anaerobic Bottle Gram positive cocci in clusters    Blood Culture - Blood, Arm, Right [[id number]]  (Abnormal) Collected: 04/03/24 0341    Lab Status: Preliminary result Specimen: Blood from Arm, Right Updated: 04/05/24 0612     Blood Culture Staphylococcus, coagulase negative     Isolated from Aerobic and Anaerobic Bottles     Gram Stain Aerobic Bottle Gram positive cocci in clusters      Anaerobic Bottle Gram positive cocci in clusters    Blood Culture ID, PCR - Blood, Arm, Right [[id number]]  (Abnormal) Collected: 04/03/24 0341    Lab Status:  Final result Specimen: Blood from Arm, Right Updated: 04/04/24 0935     BCID, PCR Staph spp, not aureus or lugdunensis. Identification by BCID2 PCR.     BOTTLE TYPE Aerobic Bottle    Urine Culture - Urine, Urine, Clean Catch [[id number]]  (Abnormal)  (Susceptibility) Collected: 04/03/24 0125    Lab Status: Final result Specimen: Urine, Clean Catch Updated: 04/05/24 0520     Urine Culture >100,000 CFU/mL Staphylococcus epidermidis    Narrative:      Colonization of the urinary tract without infection is common. Treatment is discouraged unless the patient is symptomatic, pregnant, or undergoing an invasive urologic procedure.    Susceptibility        Staphylococcus epidermidis      DARIO      Clindamycin Resistant      Inducible Clindamycin Resistance Negative      Oxacillin Susceptible      Tetracycline Susceptible      Trimethoprim + Sulfamethoxazole Susceptible      Vancomycin Susceptible                                 [x]  Radiology  US Renal Bilateral    Result Date: 4/3/2024   1. Nonvisualization right kidney compatible with history of surgical resection  2. Simple cyst left kidney without evidence of hydronephrosis.  3. Cystic structure at the base of the bladder within the region of the prostate incompletely visualized. This is of indeterminate etiology as discussed on CT. This is better seen on prior CT comparison.   Electronically Signed By-Gulile Cali On:4/3/2024 2:17 PM      CT Abdomen Pelvis With Contrast    Result Date: 4/3/2024   1. Multiple left renal cysts with at least 3 hyperdense left renal lesions. These are not simple cysts, and further characterization with nonemergent ultrasound is recommended as a starting point. No hydronephrosis. 2. Right nephrectomy within normal right nephrectomy bed. 3. Cystic lesion arising from the prostate gland exerting mass effect on the bladder base, nonspecific. Considerations would include an abscess, hypodense mass, or other cystic prostatic lesion.  Additionally, there is some asymmetric bladder wall thickening, possibly indicative of cystitis. However, urology consultation is recommended for all of these findings. 4. Tiny hiatal hernia with gastroesophageal reflux. 5. Hepatic steatosis.   Electronically Signed By-Dr. Jesse Gonzalez MD On:4/3/2024 3:24 AM      XR Chest 1 View    Result Date: 4/3/2024  Mild scarring/atelectasis in the bases, otherwise no active disease.  Electronically Signed By-Dr. Jesse Gonzalez MD On:4/3/2024 1:22 AM     []  EKG/Telemetry   []  Cardiology/Vascular   []  Pathology  []  Old records  []  Other:    Assessment & Plan   Assessment / Plan     Assessment:  Severe sepsis due to urinary tract infection.  Fever, tachycardia, lactic acidosis  Hematuria  Bacteremia  Lactic acidosis  History of hypertension  History of pulmonary embolus on Eliquis  CKD  AGUSTÍN  History of renal cell carcinoma    Plan:  Patient remains admitted to hospital for further care and management  Urology consulted, appreciate assistance  Continue to monitor urinary output closely, no further hematuria, no clots  Antibiotics adjusted to cefazolin given culture data  Repeat blood cultures negative, ordering echocardiogram  Resuming Eliquis given stability in hemoglobin  Holding further IV fluids at this time  Continue diet  Continue to hold home antihypertensives     Discussed with RN, urologist    DVT prophylaxis:  Medical and mechanical DVT prophylaxis orders are present.        CODE STATUS:   Code Status (Patient has no pulse and is not breathing): CPR (Attempt to Resuscitate)  Medical Interventions (Patient has pulse or is breathing): Full Support      Electronically signed by Uriel Mac MD, 4/5/2024, 14:21 EDT.

## 2024-04-06 LAB
ANION GAP SERPL CALCULATED.3IONS-SCNC: 8.1 MMOL/L (ref 5–15)
BACTERIA SPEC AEROBE CULT: ABNORMAL
BACTERIA SPEC AEROBE CULT: ABNORMAL
BUN SERPL-MCNC: 11 MG/DL (ref 8–23)
BUN/CREAT SERPL: 9.2 (ref 7–25)
CALCIUM SPEC-SCNC: 8.4 MG/DL (ref 8.6–10.5)
CHLORIDE SERPL-SCNC: 106 MMOL/L (ref 98–107)
CO2 SERPL-SCNC: 25.9 MMOL/L (ref 22–29)
CREAT SERPL-MCNC: 1.19 MG/DL (ref 0.76–1.27)
DEPRECATED RDW RBC AUTO: 46.7 FL (ref 37–54)
EGFRCR SERPLBLD CKD-EPI 2021: 67 ML/MIN/1.73
ERYTHROCYTE [DISTWIDTH] IN BLOOD BY AUTOMATED COUNT: 12.9 % (ref 12.3–15.4)
GLUCOSE SERPL-MCNC: 101 MG/DL (ref 65–99)
GRAM STN SPEC: ABNORMAL
HCT VFR BLD AUTO: 29.1 % (ref 37.5–51)
HGB BLD-MCNC: 9.1 G/DL (ref 13–17.7)
ISOLATED FROM: ABNORMAL
ISOLATED FROM: ABNORMAL
MAGNESIUM SERPL-MCNC: 1.9 MG/DL (ref 1.6–2.4)
MCH RBC QN AUTO: 31.1 PG (ref 26.6–33)
MCHC RBC AUTO-ENTMCNC: 31.3 G/DL (ref 31.5–35.7)
MCV RBC AUTO: 99.3 FL (ref 79–97)
PLATELET # BLD AUTO: 153 10*3/MM3 (ref 140–450)
PMV BLD AUTO: 10.3 FL (ref 6–12)
POTASSIUM SERPL-SCNC: 3.7 MMOL/L (ref 3.5–5.2)
RBC # BLD AUTO: 2.93 10*6/MM3 (ref 4.14–5.8)
SODIUM SERPL-SCNC: 140 MMOL/L (ref 136–145)
WBC NRBC COR # BLD AUTO: 10.92 10*3/MM3 (ref 3.4–10.8)

## 2024-04-06 PROCEDURE — 80048 BASIC METABOLIC PNL TOTAL CA: CPT | Performed by: INTERNAL MEDICINE

## 2024-04-06 PROCEDURE — 99232 SBSQ HOSP IP/OBS MODERATE 35: CPT | Performed by: INTERNAL MEDICINE

## 2024-04-06 PROCEDURE — 25010000002 CEFAZOLIN SODIUM 2 G RECONSTITUTED SOLUTION: Performed by: INTERNAL MEDICINE

## 2024-04-06 PROCEDURE — 85027 COMPLETE CBC AUTOMATED: CPT | Performed by: FAMILY MEDICINE

## 2024-04-06 PROCEDURE — 83735 ASSAY OF MAGNESIUM: CPT | Performed by: INTERNAL MEDICINE

## 2024-04-06 PROCEDURE — 25810000003 SODIUM CHLORIDE 0.9 % SOLUTION 500 ML FLEX CONT: Performed by: FAMILY MEDICINE

## 2024-04-06 PROCEDURE — 99231 SBSQ HOSP IP/OBS SF/LOW 25: CPT | Performed by: UROLOGY

## 2024-04-06 RX ADMIN — Medication 10 ML: at 09:16

## 2024-04-06 RX ADMIN — CETIRIZINE HYDROCHLORIDE 10 MG: 10 TABLET, FILM COATED ORAL at 09:16

## 2024-04-06 RX ADMIN — ACETAMINOPHEN 650 MG: 325 TABLET ORAL at 16:03

## 2024-04-06 RX ADMIN — APIXABAN 5 MG: 5 TABLET, FILM COATED ORAL at 09:16

## 2024-04-06 RX ADMIN — SODIUM CHLORIDE 40 ML: 9 INJECTION, SOLUTION INTRAVENOUS at 17:11

## 2024-04-06 RX ADMIN — SODIUM CHLORIDE 2000 MG: 900 INJECTION INTRAVENOUS at 06:05

## 2024-04-06 RX ADMIN — SODIUM CHLORIDE 2000 MG: 900 INJECTION INTRAVENOUS at 17:12

## 2024-04-06 RX ADMIN — FERROUS SULFATE TAB 325 MG (65 MG ELEMENTAL FE) 325 MG: 325 (65 FE) TAB at 09:16

## 2024-04-06 RX ADMIN — FLUTICASONE PROPIONATE 2 SPRAY: 50 SPRAY, METERED NASAL at 09:16

## 2024-04-06 RX ADMIN — ATORVASTATIN CALCIUM 10 MG: 10 TABLET, FILM COATED ORAL at 09:16

## 2024-04-06 RX ADMIN — SODIUM CHLORIDE 2000 MG: 900 INJECTION INTRAVENOUS at 11:13

## 2024-04-06 NOTE — PROGRESS NOTES
[facility]   Hospitalist Progress Note  Date: 2024  Patient Name: Facundo Hinds  : 1955  MRN: [id number]  Date of admission: 4/3/2024  Room/Bed: [facility]      Subjective   Subjective     Chief Complaint:   Hematuria    Summary:  Facundo Hinds is a 69 y.o. male with medical history of hypertension, PE on Eliquis, renal cell carcinoma, CKD, AGUSTÍN, and GERD presented to the ED with hematuria.  Patient started with hematuria around 8 PM on the evening of 2024.  Patient endorses associated dysuria, subjective fevers chills diaphoresis and rigors.  Patient with a history of renal cell carcinoma, similar episode 4 years ago while in [place].  Workup included cystoscopy with no significant findings. In the ED patient was febrile and tachycardic on arrival with remaining vitals being within normal limits.  Labs showed that he had elevated lactic acid level with leukopenia, elevated procalcitonin, hypomagnesemia, mild anemia, and gross hematuria on urinalysis.  CT of the abdomen showed multiple left renal cyst with hyperdense lesions, Cystic lesion at arising from the prostate gland with mass effect on the bladder base along with asymmetric bladder wall thickening.  Urology was consulted in the emergency department.    Interval Followup:   Hemoglobin stable, white count continues to downtrend.  Staph is resulted as Staph epidermidis 2 out of 2, also growing in urine.  Will discuss with infectious disease today.  Continue on IV antibiotics currently.    Objective   Objective     Vitals:   Temp:  [98.1 °F (36.7 °C)-98.6 °F (37 °C)] 98.6 °F (37 °C)  Heart Rate:  [59-74] 61  Resp:  [16-19] 16  BP: (106-130)/(55-70) 126/65    Physical Exam               Constitutional: Awake, alert, no acute distress              Eyes: PERRLA, sclerae anicteric, no conjunctival injection              HENT: NCAT, mucous membranes moist              Neck: Supple, no thyromegaly, no lymphadenopathy, trachea midline               Respiratory: Clear to auscultation bilaterally, nonlabored respirations               Cardiovascular: RRR, no murmurs, rubs, or gallops, palpable pedal pulses bilaterally              Gastrointestinal: Positive bowel sounds, soft, nontender, nondistended              Musculoskeletal: No bilateral ankle edema, no clubbing or cyanosis to extremities              Neurologic: Oriented x 3, strength symmetric in all extremities, Cranial Nerves grossly intact to confrontation, speech clear    Result Review    Result Review:  I have personally reviewed these results:  [x]  Laboratory      Lab 04/06/24 0311 04/05/24 0448 04/04/24  0325 04/04/24  0144 04/03/24  1933 04/03/24  1326 04/03/24  0341 04/03/24  0150 04/03/24  0108   WBC 10.92* 13.75* 20.16*  --   --   --    < >  --  2.74*   HEMOGLOBIN 9.1* 9.2* 9.8*  --   --   --    < >  --  12.6*   HEMATOCRIT 29.1* 28.7* 30.5*  --   --   --    < >  --  38.3   PLATELETS 153 140 146  --   --   --    < >  --  182   NEUTROS ABS  --   --   --   --   --   --   --   --  2.18   IMMATURE GRANS (ABS)  --   --   --   --   --   --   --   --  0.02   LYMPHS ABS  --   --   --   --   --   --   --   --  0.48*   MONOS ABS  --   --   --   --   --   --   --   --  0.03*   EOS ABS  --   --   --   --   --   --   --   --  0.02   MCV 99.3* 99.0* 99.0*  --   --   --    < >  --  96.7   PROCALCITONIN  --   --   --   --   --   --   --  2.59*  --    LACTATE  --   --   --  2.0 3.1* 4.4*   < >  --   --     < > = values in this interval not displayed.         Lab 04/06/24 0311 04/05/24 0448 04/04/24  0325   SODIUM 140 139 138   POTASSIUM 3.7 3.8 4.0   CHLORIDE 106 104 102   CO2 25.9 24.3 22.8   ANION GAP 8.1 10.7 13.2   BUN 11 14 21   CREATININE 1.19 1.17 1.46*   EGFR 67.0 68.3 52.4*   GLUCOSE 101* 111* 102*   CALCIUM 8.4* 8.3* 8.2*   MAGNESIUM 1.9 1.7 1.7         Lab 04/03/24  0150   TOTAL PROTEIN 7.1   ALBUMIN 3.9   GLOBULIN 3.2   ALT (SGPT) 48*   AST (SGOT) 39   BILIRUBIN 0.4   ALK PHOS 83         Lab  04/03/24  0150   HSTROP T 43*                 Brief Urine Lab Results  (Last result in the past 365 days)        Color   Clarity   Blood   Leuk Est   Nitrite   Protein   CREAT   Urine HCG        04/03/24 0125 Red   Turbid   --  Comment: Result not available due to interfering substances.   --  Comment: Result not available due to interfering substances.   --  Comment: Result not available due to interfering substances.   --  Comment: Result not available due to interfering substances.                 [x]  Microbiology   Microbiology Results (last 10 days)       Procedure Component Value - Date/Time    Blood Culture - Blood, Hand, Right [[id number]]  (Normal) Collected: 04/04/24 1002    Lab Status: Preliminary result Specimen: Blood from Hand, Right Updated: 04/06/24 1030     Blood Culture No growth at 2 days    Blood Culture - Blood, Arm, Right [[id number]]  (Normal) Collected: 04/04/24 1002    Lab Status: Preliminary result Specimen: Blood from Arm, Right Updated: 04/06/24 1030     Blood Culture No growth at 2 days    Blood Culture - Blood, Arm, Left [[id number]]  (Abnormal) Collected: 04/03/24 0451    Lab Status: Final result Specimen: Blood from Arm, Left Updated: 04/06/24 0636     Blood Culture Staphylococcus epidermidis     Isolated from Aerobic and Anaerobic Bottles     Gram Stain Aerobic Bottle Gram positive cocci in clusters      Anaerobic Bottle Gram positive cocci in clusters    Narrative:      Refer to previous blood culture collected on 04/03/2024 0341 for MICs.      Blood Culture - Blood, Arm, Right [[id number]]  (Abnormal)  (Susceptibility) Collected: 04/03/24 0341    Lab Status: Final result Specimen: Blood from Arm, Right Updated: 04/06/24 0636     Blood Culture Staphylococcus epidermidis     Isolated from Aerobic and Anaerobic Bottles     Gram Stain Aerobic Bottle Gram positive cocci in clusters      Anaerobic Bottle Gram positive cocci in clusters    Susceptibility        Staphylococcus epidermidis       DARIO      Oxacillin Susceptible      Vancomycin Susceptible                           Blood Culture ID, PCR - Blood, Arm, Right [[id number]]  (Abnormal) Collected: 04/03/24 0341    Lab Status: Final result Specimen: Blood from Arm, Right Updated: 04/04/24 0935     BCID, PCR Staph spp, not aureus or lugdunensis. Identification by BCID2 PCR.     BOTTLE TYPE Aerobic Bottle    Urine Culture - Urine, Urine, Clean Catch [[id number]]  (Abnormal)  (Susceptibility) Collected: 04/03/24 0125    Lab Status: Final result Specimen: Urine, Clean Catch Updated: 04/05/24 0520     Urine Culture >100,000 CFU/mL Staphylococcus epidermidis    Narrative:      Colonization of the urinary tract without infection is common. Treatment is discouraged unless the patient is symptomatic, pregnant, or undergoing an invasive urologic procedure.    Susceptibility        Staphylococcus epidermidis      DARIO      Clindamycin Resistant      Inducible Clindamycin Resistance Negative      Oxacillin Susceptible      Tetracycline Susceptible      Trimethoprim + Sulfamethoxazole Susceptible      Vancomycin Susceptible                                 [x]  Radiology  US Renal Bilateral    Result Date: 4/3/2024   1. Nonvisualization right kidney compatible with history of surgical resection  2. Simple cyst left kidney without evidence of hydronephrosis.  3. Cystic structure at the base of the bladder within the region of the prostate incompletely visualized. This is of indeterminate etiology as discussed on CT. This is better seen on prior CT comparison.   Electronically Signed By-Guille Cali On:4/3/2024 2:17 PM      CT Abdomen Pelvis With Contrast    Result Date: 4/3/2024   1. Multiple left renal cysts with at least 3 hyperdense left renal lesions. These are not simple cysts, and further characterization with nonemergent ultrasound is recommended as a starting point. No hydronephrosis. 2. Right nephrectomy within normal right nephrectomy bed. 3. Cystic  lesion arising from the prostate gland exerting mass effect on the bladder base, nonspecific. Considerations would include an abscess, hypodense mass, or other cystic prostatic lesion. Additionally, there is some asymmetric bladder wall thickening, possibly indicative of cystitis. However, urology consultation is recommended for all of these findings. 4. Tiny hiatal hernia with gastroesophageal reflux. 5. Hepatic steatosis.   Electronically Signed By-Dr. Jesse Gonzalez MD On:4/3/2024 3:24 AM      XR Chest 1 View    Result Date: 4/3/2024  Mild scarring/atelectasis in the bases, otherwise no active disease.  Electronically Signed By-Dr. Jesse Gonzalez MD On:4/3/2024 1:22 AM     []  EKG/Telemetry   []  Cardiology/Vascular   []  Pathology  []  Old records  []  Other:    Assessment & Plan   Assessment / Plan     Assessment:  Severe sepsis due to urinary tract infection.  Fever, tachycardia, lactic acidosis  Urinary tract infection, Staph epidermidis  Hematuria  Bacteremia, Staph epidermidis 2 out of 2  Lactic acidosis  History of hypertension  History of pulmonary embolus on Eliquis  CKD  AGUSTÍN  History of renal cell carcinoma    Plan:  Patient remains admitted to hospital for further care and management  Urology consulted, appreciate assistance  Continue to monitor urinary output closely, no further hematuria, no clots  Antibiotics adjusted to cefazolin given culture data, cultures have returned Staph epidermidis 2 out of 2 along with positive urine for Staph epidermidis  Repeat blood cultures negative  Transthoracic echocardiogram reviewed, valves are structurally normal  Continue Eliquis, monitor closely  Continue diet  Continue to hold home antihypertensives     Discussed with RN    DVT prophylaxis:  Medical and mechanical DVT prophylaxis orders are present.        CODE STATUS:   Code Status (Patient has no pulse and is not breathing): CPR (Attempt to Resuscitate)  Medical Interventions (Patient has pulse or is  breathing): Full Support      Electronically signed by Uriel Mac MD, 4/6/2024, 12:41 EDT.

## 2024-04-06 NOTE — PROGRESS NOTES
[facility]     Progress Note    Patient Name: Facundo Hinds  : 1955  MRN: [id number]  Primary Care Physician:  Ever Topete DO  Date of admission: 4/3/2024    Subjective   Subjective     Chief Complaint: No complaints today    Blood in Urine      Patient Reports patient came to the hospital several days ago with complaints of dysuria frequency urgency of urination and gross hematuria he was on Eliquis.  Also he has been having some blood in his stool which is from the hemorrhoids that he has.  He has not had any hematuria for almost 48 hours now he is on IV antibiotics.    Review of Systems   Genitourinary:  Positive for hematuria.       Objective   Objective     Vitals:   Temp:  [98.1 °F (36.7 °C)-98.6 °F (37 °C)] 98.1 °F (36.7 °C)  Heart Rate:  [59-74] 61  Resp:  [17-20] 17  BP: (106-130)/(55-70) 117/70    Physical Exam   Awake alert oriented x 3  Afebrile vital signs are stable  Abdomen is soft nontender nondistended no rebound guarding or rigidity noted  No CVA tenderness  Extremities no clubbing cyanosis or edema  Result Review    Result Review:  I have personally reviewed the results from the time of this admission to 2024 08:23 EDT and agree with these findings:  [x]  Laboratory list / accordion  []  Microbiology  []  Radiology  []  EKG/Telemetry   []  Cardiology/Vascular   []  Pathology  []  Old records  []  Other:  Most notable findings include: Creatinine 1.19 white blood cell count 10.90 hemoglobin 9.1 hematocrit 29.1      Assessment & Plan   Assessment / Plan     Brief Patient Summary:  Facundo Hinds is a 69 y.o. male who admitted with urinary tract infection and gross hematuria.    Active Hospital Problems:  Active Hospital Problems    Diagnosis     **Hematuria     UTI (urinary tract infection), bacterial     History of nephrectomy, right     Kidney mass     History of pulmonary embolism     History of DVT (deep vein thrombosis)     Obesity due to excess calories     Stage  3a chronic kidney disease     Hypertension      Plan:   Gross hematuria after presentation to the hospital patient was started on IV antibiotics and the hematuria has now resolved for the last 48 hours.  He does still pass some blood rectally because of the hemorrhoids.  Patient was on Eliquis.  I discussed the need for patient to follow-up with his urologist and he states that he will do that.  I told him he needs to have cystoscopy and he understands that.  Discharge plans per hospitalist service.    DVT prophylaxis:  Medical and mechanical DVT prophylaxis orders are present.        CODE STATUS:    Code Status (Patient has no pulse and is not breathing): CPR (Attempt to Resuscitate)  Medical Interventions (Patient has pulse or is breathing): Full Support    Disposition:  I expect patient to be discharged home.    Anamaria Pascual MD

## 2024-04-07 LAB
ANION GAP SERPL CALCULATED.3IONS-SCNC: 8.3 MMOL/L (ref 5–15)
BUN SERPL-MCNC: 12 MG/DL (ref 8–23)
BUN/CREAT SERPL: 8.5 (ref 7–25)
CALCIUM SPEC-SCNC: 8.4 MG/DL (ref 8.6–10.5)
CHLORIDE SERPL-SCNC: 105 MMOL/L (ref 98–107)
CO2 SERPL-SCNC: 25.7 MMOL/L (ref 22–29)
CREAT SERPL-MCNC: 1.41 MG/DL (ref 0.76–1.27)
DEPRECATED RDW RBC AUTO: 46.1 FL (ref 37–54)
EGFRCR SERPLBLD CKD-EPI 2021: 54.6 ML/MIN/1.73
ERYTHROCYTE [DISTWIDTH] IN BLOOD BY AUTOMATED COUNT: 12.9 % (ref 12.3–15.4)
GLUCOSE SERPL-MCNC: 92 MG/DL (ref 65–99)
HCT VFR BLD AUTO: 29.8 % (ref 37.5–51)
HGB BLD-MCNC: 9.5 G/DL (ref 13–17.7)
MAGNESIUM SERPL-MCNC: 1.8 MG/DL (ref 1.6–2.4)
MCH RBC QN AUTO: 31.4 PG (ref 26.6–33)
MCHC RBC AUTO-ENTMCNC: 31.9 G/DL (ref 31.5–35.7)
MCV RBC AUTO: 98.3 FL (ref 79–97)
PLATELET # BLD AUTO: 189 10*3/MM3 (ref 140–450)
PMV BLD AUTO: 10.5 FL (ref 6–12)
POTASSIUM SERPL-SCNC: 4 MMOL/L (ref 3.5–5.2)
RBC # BLD AUTO: 3.03 10*6/MM3 (ref 4.14–5.8)
SODIUM SERPL-SCNC: 139 MMOL/L (ref 136–145)
WBC NRBC COR # BLD AUTO: 9.04 10*3/MM3 (ref 3.4–10.8)

## 2024-04-07 PROCEDURE — 80048 BASIC METABOLIC PNL TOTAL CA: CPT | Performed by: INTERNAL MEDICINE

## 2024-04-07 PROCEDURE — 99231 SBSQ HOSP IP/OBS SF/LOW 25: CPT | Performed by: UROLOGY

## 2024-04-07 PROCEDURE — 36415 COLL VENOUS BLD VENIPUNCTURE: CPT | Performed by: FAMILY MEDICINE

## 2024-04-07 PROCEDURE — 25010000002 CEFAZOLIN SODIUM 2 G RECONSTITUTED SOLUTION: Performed by: INTERNAL MEDICINE

## 2024-04-07 PROCEDURE — 85027 COMPLETE CBC AUTOMATED: CPT | Performed by: FAMILY MEDICINE

## 2024-04-07 PROCEDURE — 83735 ASSAY OF MAGNESIUM: CPT | Performed by: INTERNAL MEDICINE

## 2024-04-07 PROCEDURE — 99232 SBSQ HOSP IP/OBS MODERATE 35: CPT | Performed by: INTERNAL MEDICINE

## 2024-04-07 RX ORDER — CEPHALEXIN 500 MG/1
1000 CAPSULE ORAL EVERY 8 HOURS SCHEDULED
Status: DISCONTINUED | OUTPATIENT
Start: 2024-04-07 | End: 2024-04-08 | Stop reason: HOSPADM

## 2024-04-07 RX ADMIN — SODIUM CHLORIDE 2000 MG: 900 INJECTION INTRAVENOUS at 05:34

## 2024-04-07 RX ADMIN — SODIUM CHLORIDE 2000 MG: 900 INJECTION INTRAVENOUS at 12:10

## 2024-04-07 RX ADMIN — ACETAMINOPHEN 650 MG: 325 TABLET ORAL at 06:12

## 2024-04-07 RX ADMIN — MINERAL OIL, PETROLATUM, PHENYLEPHRINE HCI 1 APPLICATION: .25; 14; 74.9 OINTMENT TOPICAL at 21:14

## 2024-04-07 RX ADMIN — FLUTICASONE PROPIONATE 2 SPRAY: 50 SPRAY, METERED NASAL at 09:19

## 2024-04-07 RX ADMIN — CEPHALEXIN 1000 MG: 500 CAPSULE ORAL at 21:11

## 2024-04-07 RX ADMIN — CEPHALEXIN 1000 MG: 500 CAPSULE ORAL at 14:09

## 2024-04-07 RX ADMIN — SODIUM CHLORIDE 2000 MG: 900 INJECTION INTRAVENOUS at 00:09

## 2024-04-07 RX ADMIN — ATORVASTATIN CALCIUM 10 MG: 10 TABLET, FILM COATED ORAL at 09:19

## 2024-04-07 RX ADMIN — FERROUS SULFATE TAB 325 MG (65 MG ELEMENTAL FE) 325 MG: 325 (65 FE) TAB at 09:19

## 2024-04-07 RX ADMIN — Medication 10 ML: at 21:11

## 2024-04-07 RX ADMIN — Medication 10 ML: at 09:19

## 2024-04-07 RX ADMIN — CETIRIZINE HYDROCHLORIDE 10 MG: 10 TABLET, FILM COATED ORAL at 09:19

## 2024-04-07 RX ADMIN — Medication 10 ML: at 00:09

## 2024-04-07 NOTE — PROGRESS NOTES
[facility]   Hospitalist Progress Note  Date: 2024  Patient Name: Facundo Hinds  : 1955  MRN: [id number]  Date of admission: 4/3/2024  Room/Bed: [facility]      Subjective   Subjective     Chief Complaint:   Hematuria    Summary:  Facundo Hinds is a 69 y.o. male with medical history of hypertension, PE on Eliquis, renal cell carcinoma, CKD, AGUSTÍN, and GERD presented to the ED with hematuria.  Patient started with hematuria around 8 PM on the evening of 2024.  Patient endorses associated dysuria, subjective fevers chills diaphoresis and rigors.  Patient with a history of renal cell carcinoma, similar episode 4 years ago while in [place].  Workup included cystoscopy with no significant findings. In the ED patient was febrile and tachycardic on arrival with remaining vitals being within normal limits.  Labs showed that he had elevated lactic acid level with leukopenia, elevated procalcitonin, hypomagnesemia, mild anemia, and gross hematuria on urinalysis.  CT of the abdomen showed multiple left renal cyst with hyperdense lesions, Cystic lesion at arising from the prostate gland with mass effect on the bladder base along with asymmetric bladder wall thickening.  Urology was consulted in the emergency department.  Patient's blood cultures 2 out of 2 positive for Staph epidermidis, also growing in urine.  Discussed with infectious disease we will treat with a full 2-week course.    Interval Followup:   Discussed with infectious disease, will treat with to week course of antibiotics.  Given stability we will transition over to oral Keflex today.  Recommendations from infectious disease for 1 g 3 times daily for total 2-week antibiotic course.  Will consider stop date the .  Overnight, patient with some hematuria will continue to hold patient's home Eliquis.  Hemoglobin stable on labs today.      Objective   Objective     Vitals:   Temp:  [97.9 °F (36.6 °C)-98.8 °F (37.1 °C)] 98.1 °F (36.7  °C)  Heart Rate:  [55-71] 67  Resp:  [17-18] 18  BP: (111-133)/(54-79) 132/72    Physical Exam               Constitutional: Awake, alert, no acute distress              Eyes: PERRLA, sclerae anicteric, no conjunctival injection              HENT: NCAT, mucous membranes moist              Neck: Supple, no thyromegaly, no lymphadenopathy, trachea midline              Respiratory: Clear to auscultation bilaterally, nonlabored respirations               Cardiovascular: RRR, no murmurs, rubs, or gallops, palpable pedal pulses bilaterally              Gastrointestinal: Positive bowel sounds, soft, nontender, nondistended              Musculoskeletal: No bilateral ankle edema, no clubbing or cyanosis to extremities              Neurologic: Oriented x 3, strength symmetric in all extremities, Cranial Nerves grossly intact to confrontation, speech clear    Result Review    Result Review:  I have personally reviewed these results:  [x]  Laboratory      Lab 04/07/24  0508 04/06/24  0311 04/05/24  0448 04/04/24  0325 04/04/24  0144 04/03/24  1933 04/03/24  1326 04/03/24  0341 04/03/24  0150 04/03/24  0108   WBC 9.04 10.92* 13.75*   < >  --   --   --    < >  --  2.74*   HEMOGLOBIN 9.5* 9.1* 9.2*   < >  --   --   --    < >  --  12.6*   HEMATOCRIT 29.8* 29.1* 28.7*   < >  --   --   --    < >  --  38.3   PLATELETS 189 153 140   < >  --   --   --    < >  --  182   NEUTROS ABS  --   --   --   --   --   --   --   --   --  2.18   IMMATURE GRANS (ABS)  --   --   --   --   --   --   --   --   --  0.02   LYMPHS ABS  --   --   --   --   --   --   --   --   --  0.48*   MONOS ABS  --   --   --   --   --   --   --   --   --  0.03*   EOS ABS  --   --   --   --   --   --   --   --   --  0.02   MCV 98.3* 99.3* 99.0*   < >  --   --   --    < >  --  96.7   PROCALCITONIN  --   --   --   --   --   --   --   --  2.59*  --    LACTATE  --   --   --   --  2.0 3.1* 4.4*   < >  --   --     < > = values in this interval not displayed.         Lab  04/07/24  0508 04/06/24  0311 04/05/24  0448   SODIUM 139 140 139   POTASSIUM 4.0 3.7 3.8   CHLORIDE 105 106 104   CO2 25.7 25.9 24.3   ANION GAP 8.3 8.1 10.7   BUN 12 11 14   CREATININE 1.41* 1.19 1.17   EGFR 54.6* 67.0 68.3   GLUCOSE 92 101* 111*   CALCIUM 8.4* 8.4* 8.3*   MAGNESIUM 1.8 1.9 1.7         Lab 04/03/24  0150   TOTAL PROTEIN 7.1   ALBUMIN 3.9   GLOBULIN 3.2   ALT (SGPT) 48*   AST (SGOT) 39   BILIRUBIN 0.4   ALK PHOS 83         Lab 04/03/24  0150   HSTROP T 43*                 Brief Urine Lab Results  (Last result in the past 365 days)        Color   Clarity   Blood   Leuk Est   Nitrite   Protein   CREAT   Urine HCG        04/03/24 0125 Red   Turbid   --  Comment: Result not available due to interfering substances.   --  Comment: Result not available due to interfering substances.   --  Comment: Result not available due to interfering substances.   --  Comment: Result not available due to interfering substances.                 [x]  Microbiology   Microbiology Results (last 10 days)       Procedure Component Value - Date/Time    Blood Culture - Blood, Hand, Right [[id number]]  (Normal) Collected: 04/04/24 1002    Lab Status: Preliminary result Specimen: Blood from Hand, Right Updated: 04/07/24 1030     Blood Culture No growth at 3 days    Blood Culture - Blood, Arm, Right [[id number]]  (Normal) Collected: 04/04/24 1002    Lab Status: Preliminary result Specimen: Blood from Arm, Right Updated: 04/07/24 1030     Blood Culture No growth at 3 days    Blood Culture - Blood, Arm, Left [[id number]]  (Abnormal) Collected: 04/03/24 0451    Lab Status: Final result Specimen: Blood from Arm, Left Updated: 04/06/24 0636     Blood Culture Staphylococcus epidermidis     Isolated from Aerobic and Anaerobic Bottles     Gram Stain Aerobic Bottle Gram positive cocci in clusters      Anaerobic Bottle Gram positive cocci in clusters    Narrative:      Refer to previous blood culture collected on 04/03/2024 0341 for MICs.       Blood Culture - Blood, Arm, Right [[id number]]  (Abnormal)  (Susceptibility) Collected: 04/03/24 0341    Lab Status: Final result Specimen: Blood from Arm, Right Updated: 04/06/24 0636     Blood Culture Staphylococcus epidermidis     Isolated from Aerobic and Anaerobic Bottles     Gram Stain Aerobic Bottle Gram positive cocci in clusters      Anaerobic Bottle Gram positive cocci in clusters    Susceptibility        Staphylococcus epidermidis      DARIO      Oxacillin Susceptible      Vancomycin Susceptible                           Blood Culture ID, PCR - Blood, Arm, Right [[id number]]  (Abnormal) Collected: 04/03/24 0341    Lab Status: Final result Specimen: Blood from Arm, Right Updated: 04/04/24 0935     BCID, PCR Staph spp, not aureus or lugdunensis. Identification by BCID2 PCR.     BOTTLE TYPE Aerobic Bottle    Urine Culture - Urine, Urine, Clean Catch [[id number]]  (Abnormal)  (Susceptibility) Collected: 04/03/24 0125    Lab Status: Final result Specimen: Urine, Clean Catch Updated: 04/05/24 0520     Urine Culture >100,000 CFU/mL Staphylococcus epidermidis    Narrative:      Colonization of the urinary tract without infection is common. Treatment is discouraged unless the patient is symptomatic, pregnant, or undergoing an invasive urologic procedure.    Susceptibility        Staphylococcus epidermidis      DARIO      Clindamycin Resistant      Inducible Clindamycin Resistance Negative      Oxacillin Susceptible      Tetracycline Susceptible      Trimethoprim + Sulfamethoxazole Susceptible      Vancomycin Susceptible                                 [x]  Radiology  US Renal Bilateral    Result Date: 4/3/2024   1. Nonvisualization right kidney compatible with history of surgical resection  2. Simple cyst left kidney without evidence of hydronephrosis.  3. Cystic structure at the base of the bladder within the region of the prostate incompletely visualized. This is of indeterminate etiology as discussed on CT. This is  better seen on prior CT comparison.   Electronically Signed By-Guille Cali On:4/3/2024 2:17 PM      CT Abdomen Pelvis With Contrast    Result Date: 4/3/2024   1. Multiple left renal cysts with at least 3 hyperdense left renal lesions. These are not simple cysts, and further characterization with nonemergent ultrasound is recommended as a starting point. No hydronephrosis. 2. Right nephrectomy within normal right nephrectomy bed. 3. Cystic lesion arising from the prostate gland exerting mass effect on the bladder base, nonspecific. Considerations would include an abscess, hypodense mass, or other cystic prostatic lesion. Additionally, there is some asymmetric bladder wall thickening, possibly indicative of cystitis. However, urology consultation is recommended for all of these findings. 4. Tiny hiatal hernia with gastroesophageal reflux. 5. Hepatic steatosis.   Electronically Signed By-Dr. Jesse Gonzalez MD On:4/3/2024 3:24 AM      XR Chest 1 View    Result Date: 4/3/2024  Mild scarring/atelectasis in the bases, otherwise no active disease.  Electronically Signed By-Dr. Jesse Gonzalez MD On:4/3/2024 1:22 AM     []  EKG/Telemetry   []  Cardiology/Vascular   []  Pathology  []  Old records  []  Other:    Assessment & Plan   Assessment / Plan     Assessment:  Severe sepsis due to urinary tract infection.  Fever, tachycardia, lactic acidosis  Urinary tract infection, Staph epidermidis  Hematuria  Bacteremia, Staph epidermidis 2 out of 2  Lactic acidosis  History of hypertension  History of pulmonary embolus on Eliquis  CKD  AGUSTÍN  History of renal cell carcinoma    Plan:  Patient remains admitted to hospital for further care and management  Urology consulted, appreciate assistance  Further holding Eliquis today given repeat episode of hematuria yesterday  Following discussion with infectious disease, will transition patient over to Keflex 1 g 3 times daily.  Patient will need a total 2-week course of antibiotics  following negative blood cultures  Transthoracic echocardiogram reviewed, valves are structurally normal  Continue diet  Continue to hold home antihypertensives     Discussed with RN, infectious disease    DVT prophylaxis:  Medical and mechanical DVT prophylaxis orders are present.        CODE STATUS:   Code Status (Patient has no pulse and is not breathing): CPR (Attempt to Resuscitate)  Medical Interventions (Patient has pulse or is breathing): Full Support      Electronically signed by Uriel Mac MD, 4/7/2024, 12:48 EDT.

## 2024-04-07 NOTE — PLAN OF CARE
Goal Outcome Evaluation:  Plan of Care Reviewed With: patient        Progress: improving  Outcome Evaluation: Vital signs are stable this shift. Denies any pain or discomfort at this time. No acute changes noted this shift. Patient is taking antibiotic by mouth and tolerated first dose well. Patient is resting in bed with call light in reach.

## 2024-04-07 NOTE — PLAN OF CARE
Goal Outcome Evaluation:  Plan of Care Reviewed With: patient      Pt is alert and oriented x4. Has rested on and off throughout the shift. IV antibiotics administerd as ordered. Pt continues to have moderate amount of blood in his urine. Call light is in reach and pt is able to make his needs known.

## 2024-04-07 NOTE — PROGRESS NOTES
[facility]     Progress Note    Patient Name: Facundo Hinds  : 1955  MRN: [id number]  Primary Care Physician:  Ever Topete DO  Date of admission: 4/3/2024    Subjective   Subjective     Chief Complaint: Gross hematuria    Blood in Urine      Patient Reports patient had come into the hospital on Eliquis with gross hematuria and symptoms of urinary tract infection.  The Eliquis was stopped he was started on IV antibiotics and the urine cleared quickly thereafter.  The Eliquis was restarted and he developed gross hematuria again.  He denies any fevers chills nausea vomiting abdominal pain or symptoms of urinary retention.  The Eliquis was stopped and the urine cleared again.  Patient does have a urologist that he sees regularly.    Review of Systems   Genitourinary:  Positive for hematuria.       Objective   Objective     Vitals:   Temp:  [97.9 °F (36.6 °C)-98.8 °F (37.1 °C)] 98.8 °F (37.1 °C)  Heart Rate:  [55-71] 71  Resp:  [16-18] 18  BP: (111-133)/(54-79) 124/58    Physical Exam   Afebrile vital signs are stable  Awake alert oriented x 3  Abdomen is soft nontender nondistended no rebound guarding or rigidity is noted.  Bladder is not palpable there are no CVA tenderness.  Result Review    Result Review:  I have personally reviewed the results from the time of this admission to 2024 08:45 EDT and agree with these findings:  [x]  Laboratory list / accordion  []  Microbiology  []  Radiology  []  EKG/Telemetry   []  Cardiology/Vascular   []  Pathology  []  Old records  []  Other:  Most notable findings include: Creatinine is 1.41 yesterday was 1.19 on  it was 1.46 white blood cell count is 9 hemoglobin 9.5 hematocrit 29.8.      Assessment & Plan   Assessment / Plan     Brief Patient Summary:  Facundo Hinds is a 69 y.o. male who gross hematuria    Active Hospital Problems:  Active Hospital Problems    Diagnosis     **Hematuria     UTI (urinary tract infection), bacterial     History  of nephrectomy, right     Kidney mass     History of pulmonary embolism     History of DVT (deep vein thrombosis)     Obesity due to excess calories     Stage 3a chronic kidney disease     Hypertension      Plan:   Patient has a regular urologist I urged him to make sure he goes and sees him as soon as possible and he states he will do that.  I believe he does need cystoscopy even though he did appears to have been a urinary tract but the gross hematuria has been significant while he is on the Eliquis.  Being off of the Eliquis the urine is clear today.    DVT prophylaxis:  Medical and mechanical DVT prophylaxis orders are present.        CODE STATUS:    Code Status (Patient has no pulse and is not breathing): CPR (Attempt to Resuscitate)  Medical Interventions (Patient has pulse or is breathing): Full Support    Disposition:  I expect patient to be discharged home.    Anamaria Pascual MD

## 2024-04-08 ENCOUNTER — READMISSION MANAGEMENT (OUTPATIENT)
Dept: CALL CENTER | Facility: HOSPITAL | Age: 69
End: 2024-04-08
Payer: MEDICARE

## 2024-04-08 VITALS
OXYGEN SATURATION: 96 % | HEIGHT: 70 IN | HEART RATE: 60 BPM | SYSTOLIC BLOOD PRESSURE: 136 MMHG | DIASTOLIC BLOOD PRESSURE: 64 MMHG | BODY MASS INDEX: 38.98 KG/M2 | RESPIRATION RATE: 18 BRPM | TEMPERATURE: 98.4 F | WEIGHT: 272.27 LBS

## 2024-04-08 LAB
ANION GAP SERPL CALCULATED.3IONS-SCNC: 9.1 MMOL/L (ref 5–15)
BUN SERPL-MCNC: 11 MG/DL (ref 8–23)
BUN/CREAT SERPL: 8.7 (ref 7–25)
CALCIUM SPEC-SCNC: 8.3 MG/DL (ref 8.6–10.5)
CHLORIDE SERPL-SCNC: 105 MMOL/L (ref 98–107)
CO2 SERPL-SCNC: 24.9 MMOL/L (ref 22–29)
CREAT SERPL-MCNC: 1.26 MG/DL (ref 0.76–1.27)
DEPRECATED RDW RBC AUTO: 45.4 FL (ref 37–54)
EGFRCR SERPLBLD CKD-EPI 2021: 62.5 ML/MIN/1.73
ERYTHROCYTE [DISTWIDTH] IN BLOOD BY AUTOMATED COUNT: 12.8 % (ref 12.3–15.4)
GLUCOSE SERPL-MCNC: 92 MG/DL (ref 65–99)
HCT VFR BLD AUTO: 29.6 % (ref 37.5–51)
HGB BLD-MCNC: 9.4 G/DL (ref 13–17.7)
MAGNESIUM SERPL-MCNC: 1.9 MG/DL (ref 1.6–2.4)
MCH RBC QN AUTO: 31.3 PG (ref 26.6–33)
MCHC RBC AUTO-ENTMCNC: 31.8 G/DL (ref 31.5–35.7)
MCV RBC AUTO: 98.7 FL (ref 79–97)
PLATELET # BLD AUTO: 179 10*3/MM3 (ref 140–450)
PMV BLD AUTO: 10.1 FL (ref 6–12)
POTASSIUM SERPL-SCNC: 3.6 MMOL/L (ref 3.5–5.2)
RBC # BLD AUTO: 3 10*6/MM3 (ref 4.14–5.8)
SODIUM SERPL-SCNC: 139 MMOL/L (ref 136–145)
WBC NRBC COR # BLD AUTO: 10.05 10*3/MM3 (ref 3.4–10.8)

## 2024-04-08 PROCEDURE — 99239 HOSP IP/OBS DSCHRG MGMT >30: CPT | Performed by: INTERNAL MEDICINE

## 2024-04-08 PROCEDURE — 85027 COMPLETE CBC AUTOMATED: CPT | Performed by: FAMILY MEDICINE

## 2024-04-08 PROCEDURE — 83735 ASSAY OF MAGNESIUM: CPT | Performed by: INTERNAL MEDICINE

## 2024-04-08 PROCEDURE — 80048 BASIC METABOLIC PNL TOTAL CA: CPT | Performed by: INTERNAL MEDICINE

## 2024-04-08 RX ORDER — CEPHALEXIN 500 MG/1
1000 CAPSULE ORAL EVERY 8 HOURS SCHEDULED
Qty: 60 CAPSULE | Refills: 0 | Status: SHIPPED | OUTPATIENT
Start: 2024-04-08 | End: 2024-04-08

## 2024-04-08 RX ORDER — CEPHALEXIN 500 MG/1
1000 CAPSULE ORAL EVERY 8 HOURS SCHEDULED
Qty: 60 CAPSULE | Refills: 0 | Status: SHIPPED | OUTPATIENT
Start: 2024-04-08 | End: 2024-04-18

## 2024-04-08 RX ADMIN — ATORVASTATIN CALCIUM 10 MG: 10 TABLET, FILM COATED ORAL at 08:46

## 2024-04-08 RX ADMIN — FLUTICASONE PROPIONATE 2 SPRAY: 50 SPRAY, METERED NASAL at 08:46

## 2024-04-08 RX ADMIN — CETIRIZINE HYDROCHLORIDE 10 MG: 10 TABLET, FILM COATED ORAL at 08:46

## 2024-04-08 RX ADMIN — CEPHALEXIN 1000 MG: 500 CAPSULE ORAL at 05:30

## 2024-04-08 RX ADMIN — Medication 10 ML: at 08:47

## 2024-04-08 RX ADMIN — FERROUS SULFATE TAB 325 MG (65 MG ELEMENTAL FE) 325 MG: 325 (65 FE) TAB at 08:46

## 2024-04-08 NOTE — PLAN OF CARE
Goal Outcome Evaluation:  Plan of Care Reviewed With: patient        Progress: improving  Outcome Evaluation: Pt. tolerating po antibiotics. No evidence of hematuria, fever, etc. OH hemorrhoid cream improving agitated hemorrhoids. Pt. states ready to go home.

## 2024-04-08 NOTE — DISCHARGE SUMMARY
[facility]         HOSPITALIST  DISCHARGE SUMMARY    Patient Name: Facundo Hinds  : 1955  MRN: [id number]    Date of Admission: 4/3/2024  Date of Discharge:  2024  Primary Care Physician: Ever Topete DO    Consults:  Urology    Active and Resolved Hospital Problems:  Severe sepsis due to urinary tract infection.  Fever, tachycardia, lactic acidosis  Urinary tract infection, Staph epidermidis  Bacteremia, Staph epidermidis 2 out of 2  Hematuria  Lactic acidosis, resolved, secondary to sepsis  History of hypertension  History of pulmonary embolus on Eliquis  CKD  AGUSTÍN  History of renal cell carcinoma     Hospital Course     Hospital Course:  Facundo Hinds is a 69 y.o. male with medical history of hypertension, PE on Eliquis, renal cell carcinoma, CKD, AGUSTÍN, and GERD presented to the ED with hematuria.  Patient started with hematuria around 8 PM on the evening of 2024.  Patient endorses associated dysuria, subjective fevers chills diaphoresis and rigors.  Patient with a history of renal cell carcinoma, similar episode 4 years ago while in [place].  Workup included cystoscopy with no significant findings. In the ED patient was febrile and tachycardic on arrival with remaining vitals being within normal limits.  Labs showed that he had elevated lactic acid level with leukopenia, elevated procalcitonin, hypomagnesemia, mild anemia, and gross hematuria on urinalysis.  CT of the abdomen showed multiple left renal cyst with hyperdense lesions, Cystic lesion at arising from the prostate gland with mass effect on the bladder base along with asymmetric bladder wall thickening.  Urology was consulted in the emergency department.  Patient's blood cultures 2 out of 2 positive for Staph epidermidis, also growing in urine.  Discussed with infectious disease we will treat with a full 2-week course of Keflex.  Patient was trialed back on his home Eliquis while inpatient with return of  hematuria therefore discharging without Eliquis.  Patient instructed to follow-up closely with PCP within 1 week to resume Eliquis.  Patient also instructed to follow-up with his outpatient neurologist soon as possible.  Patient's home blood pressure medications also held on discharge.  Instructed to resume if blood pressure becomes elevated otherwise follow-up with PCP.  Patient seen on date of discharge, clinically and hemodynamically stable.  Patient provided concerning signs and symptoms prompting immediate medical attention, patient understanding and agreeable       DISCHARGE Follow Up Recommendations for labs and diagnostics:   Follow-up with PCP soon as possible  Follow-up with outpatient urologist soon as possible      Day of Discharge     Vital Signs:  Temp:  [98 °F (36.7 °C)-98.6 °F (37 °C)] 98.4 °F (36.9 °C)  Heart Rate:  [51-66] 60  Resp:  [18] 18  BP: (132-140)/(64-80) 136/64  Physical Exam:               Constitutional: Awake, alert, no acute distress              Eyes: PERRLA, sclerae anicteric, no conjunctival injection              HENT: NCAT, mucous membranes moist              Neck: Supple, no thyromegaly, no lymphadenopathy, trachea midline              Respiratory: Clear to auscultation bilaterally, nonlabored respirations               Cardiovascular: RRR, no murmurs, rubs, or gallops, palpable pedal pulses bilaterally              Gastrointestinal: Positive bowel sounds, soft, nontender, nondistended              Musculoskeletal: No bilateral ankle edema, no clubbing or cyanosis to extremities              Neurologic: Oriented x 3, strength symmetric in all extremities, Cranial Nerves grossly intact to confrontation, speech clear    Discharge Details        Discharge Medications        New Medications        Instructions Start Date   cephalexin 500 MG capsule  Commonly known as: KEFLEX   1,000 mg, Oral, Every 8 Hours Scheduled             Continue These Medications        Instructions Start  Date   acetaminophen 500 MG tablet  Commonly known as: TYLENOL   500-1,000 mg, Oral, Every 6 Hours PRN      atorvastatin 10 MG tablet  Commonly known as: Lipitor   10 mg, Oral, Daily      cetirizine 10 MG tablet  Commonly known as: zyrTEC   10 mg, Oral, Daily      cholecalciferol 25 MCG (1000 UT) tablet   1,000 Units, Oral, Daily      ferrous sulfate 325 (65 FE) MG tablet   325 mg, Oral, Daily      fluticasone 50 MCG/ACT nasal spray  Commonly known as: Flonase   2 sprays, Nasal, Daily      multivitamin with minerals tablet tablet   1 tablet, Oral, Daily             Stop These Medications      apixaban 5 MG tablet tablet  Commonly known as: ELIQUIS     lisinopril-hydrochlorothiazide 20-25 MG per tablet  Commonly known as: PRINZIDE,ZESTORETIC              No Known Allergies    Discharge Disposition:  Home or Self Care    Diet:  Hospital:  Diet Order   Procedures    Diet: Regular/House; Fluid Consistency: Thin (IDDSI 0)       Discharge Activity:   Activity Instructions       Activity as Tolerated              CODE STATUS:  Code Status and Medical Interventions:   Ordered at: 04/03/24 1616     Code Status (Patient has no pulse and is not breathing):    CPR (Attempt to Resuscitate)     Medical Interventions (Patient has pulse or is breathing):    Full Support         Future Appointments   Date Time Provider Department Center   7/23/2024 10:00 AM Ever Topete DO [facility]       Additional Instructions for the Follow-ups that You Need to Schedule       Discharge Follow-up with PCP   As directed       Currently Documented PCP:    Ever Topete DO    PCP Phone Number:    [phone]     Follow Up Details: In less than one week        Discharge Follow-up with Specified Provider: Outpatient neurologist; 1 Week   As directed      To: Outpatient neurologist   Follow Up: 1 Week       Additional information on Labs and Follow-ups:    THE DOCTOR OFFICE WILL CALL HIM FOR  APPT: AND AS FOR HIS NEUROLOGIST THE  DOCTOR WILL REFURRIAL HIM TO GO SEE THE THEM           Pertinent  and/or Most Recent Results     PROCEDURES:   NA    LAB RESULTS:      Lab 04/08/24  0441 04/07/24  0508 04/06/24  0311 04/05/24  0448 04/04/24  0325 04/04/24  0144 04/03/24  1933 04/03/24  1326 04/03/24  1120 04/03/24  0953 04/03/24  0828 04/03/24  0341 04/03/24  0150 04/03/24  0108   WBC 10.05 9.04 10.92* 13.75* 20.16*  --   --   --    < >  --   --   --   --  2.74*   HEMOGLOBIN 9.4* 9.5* 9.1* 9.2* 9.8*  --   --   --    < >  --   --   --   --  12.6*   HEMATOCRIT 29.6* 29.8* 29.1* 28.7* 30.5*  --   --   --    < >  --   --   --   --  38.3   PLATELETS 179 189 153 140 146  --   --   --    < >  --   --   --   --  182   NEUTROS ABS  --   --   --   --   --   --   --   --   --   --   --   --   --  2.18   IMMATURE GRANS (ABS)  --   --   --   --   --   --   --   --   --   --   --   --   --  0.02   LYMPHS ABS  --   --   --   --   --   --   --   --   --   --   --   --   --  0.48*   MONOS ABS  --   --   --   --   --   --   --   --   --   --   --   --   --  0.03*   EOS ABS  --   --   --   --   --   --   --   --   --   --   --   --   --  0.02   MCV 98.7* 98.3* 99.3* 99.0* 99.0*  --   --   --    < >  --   --   --   --  96.7   PROCALCITONIN  --   --   --   --   --   --   --   --   --   --   --   --  2.59*  --    LACTATE  --   --   --   --   --  2.0 3.1* 4.4*  --  4.4* 4.2*   < >  --   --     < > = values in this interval not displayed.         Lab 04/08/24  0441 04/07/24  0508 04/06/24  0311 04/05/24 0448 04/04/24  0325   SODIUM 139 139 140 139 138   POTASSIUM 3.6 4.0 3.7 3.8 4.0   CHLORIDE 105 105 106 104 102   CO2 24.9 25.7 25.9 24.3 22.8   ANION GAP 9.1 8.3 8.1 10.7 13.2   BUN 11 12 11 14 21   CREATININE 1.26 1.41* 1.19 1.17 1.46*   EGFR 62.5 54.6* 67.0 68.3 52.4*   GLUCOSE 92 92 101* 111* 102*   CALCIUM 8.3* 8.4* 8.4* 8.3* 8.2*   MAGNESIUM 1.9 1.8 1.9 1.7 1.7         Lab 04/03/24  0150   TOTAL PROTEIN 7.1   ALBUMIN 3.9   GLOBULIN 3.2   ALT (SGPT) 48*   AST (SGOT)  39   BILIRUBIN 0.4   ALK PHOS 83         Lab 04/03/24  0150   HSTROP T 43*                 Brief Urine Lab Results  (Last result in the past 365 days)        Color   Clarity   Blood   Leuk Est   Nitrite   Protein   CREAT   Urine HCG        04/03/24 0125 Red   Turbid   --  Comment: Result not available due to interfering substances.   --  Comment: Result not available due to interfering substances.   --  Comment: Result not available due to interfering substances.   --  Comment: Result not available due to interfering substances.                 Microbiology Results (last 10 days)       Procedure Component Value - Date/Time    Blood Culture - Blood, Hand, Right [[id number]]  (Normal) Collected: 04/04/24 1002    Lab Status: Preliminary result Specimen: Blood from Hand, Right Updated: 04/07/24 1030     Blood Culture No growth at 3 days    Blood Culture - Blood, Arm, Right [[id number]]  (Normal) Collected: 04/04/24 1002    Lab Status: Preliminary result Specimen: Blood from Arm, Right Updated: 04/07/24 1030     Blood Culture No growth at 3 days    Blood Culture - Blood, Arm, Left [[id number]]  (Abnormal) Collected: 04/03/24 0451    Lab Status: Final result Specimen: Blood from Arm, Left Updated: 04/06/24 0636     Blood Culture Staphylococcus epidermidis     Isolated from Aerobic and Anaerobic Bottles     Gram Stain Aerobic Bottle Gram positive cocci in clusters      Anaerobic Bottle Gram positive cocci in clusters    Narrative:      Refer to previous blood culture collected on 04/03/2024 0341 for MICs.      Blood Culture - Blood, Arm, Right [[id number]]  (Abnormal)  (Susceptibility) Collected: 04/03/24 0341    Lab Status: Final result Specimen: Blood from Arm, Right Updated: 04/06/24 0636     Blood Culture Staphylococcus epidermidis     Isolated from Aerobic and Anaerobic Bottles     Gram Stain Aerobic Bottle Gram positive cocci in clusters      Anaerobic Bottle Gram positive cocci in clusters    Susceptibility         Staphylococcus epidermidis      DARIO      Oxacillin Susceptible      Vancomycin Susceptible                           Blood Culture ID, PCR - Blood, Arm, Right [[id number]]  (Abnormal) Collected: 04/03/24 0341    Lab Status: Final result Specimen: Blood from Arm, Right Updated: 04/04/24 0935     BCID, PCR Staph spp, not aureus or lugdunensis. Identification by BCID2 PCR.     BOTTLE TYPE Aerobic Bottle    Urine Culture - Urine, Urine, Clean Catch [[id number]]  (Abnormal)  (Susceptibility) Collected: 04/03/24 0125    Lab Status: Final result Specimen: Urine, Clean Catch Updated: 04/05/24 0520     Urine Culture >100,000 CFU/mL Staphylococcus epidermidis    Narrative:      Colonization of the urinary tract without infection is common. Treatment is discouraged unless the patient is symptomatic, pregnant, or undergoing an invasive urologic procedure.    Susceptibility        Staphylococcus epidermidis      DARIO      Clindamycin Resistant      Inducible Clindamycin Resistance Negative      Oxacillin Susceptible      Tetracycline Susceptible      Trimethoprim + Sulfamethoxazole Susceptible      Vancomycin Susceptible                                   US Renal Bilateral    Result Date: 4/3/2024  Impression:  1. Nonvisualization right kidney compatible with history of surgical resection  2. Simple cyst left kidney without evidence of hydronephrosis.  3. Cystic structure at the base of the bladder within the region of the prostate incompletely visualized. This is of indeterminate etiology as discussed on CT. This is better seen on prior CT comparison.   Electronically Signed By-Guille Cali On:4/3/2024 2:17 PM      CT Abdomen Pelvis With Contrast    Result Date: 4/3/2024  Impression:  1. Multiple left renal cysts with at least 3 hyperdense left renal lesions. These are not simple cysts, and further characterization with nonemergent ultrasound is recommended as a starting point. No hydronephrosis. 2. Right nephrectomy within  normal right nephrectomy bed. 3. Cystic lesion arising from the prostate gland exerting mass effect on the bladder base, nonspecific. Considerations would include an abscess, hypodense mass, or other cystic prostatic lesion. Additionally, there is some asymmetric bladder wall thickening, possibly indicative of cystitis. However, urology consultation is recommended for all of these findings. 4. Tiny hiatal hernia with gastroesophageal reflux. 5. Hepatic steatosis.   Electronically Signed By-Dr. Jesse Gonzalez MD On:4/3/2024 3:24 AM      XR Chest 1 View    Result Date: 4/3/2024  Impression: Mild scarring/atelectasis in the bases, otherwise no active disease.  Electronically Signed By-Dr. Jesse Gonzalez MD On:4/3/2024 1:22 AM               Results for orders placed during the hospital encounter of 04/03/24    Adult Transthoracic Echo Complete W/ Cont if Necessary Per Protocol    Interpretation Summary    Left ventricular systolic function is normal. Calculated left ventricular EF = 62.7%    Left ventricular diastolic function is consistent with (grade I) impaired relaxation.      Labs Pending at Discharge:  Pending Labs       Order Current Status    Blood Culture - Blood, Arm, Right Preliminary result    Blood Culture - Blood, Hand, Right Preliminary result              Time spent on Discharge including face to face service:  35 minutes    Electronically signed by Uriel Mac MD, 04/08/24, 9:38 AM EDT.

## 2024-04-08 NOTE — DISCHARGE INSTR - LAB
THE DOCTOR OFFICE WILL CALL HIM FOR  APPT: AND AS FOR HIS NEUROLOGIST THE DOCTOR WILL REFURRIAL HIM TO GO SEE THE THEM

## 2024-04-08 NOTE — OUTREACH NOTE
Prep Survey      Flowsheet Row Responses   [facility] patient discharged from? [facility]   Is LACE score < 7 ? No   Eligibility [facility]   Date of Admission 04/03/24   Date of Discharge 04/08/24   Discharge Disposition Home or Self Care   Discharge diagnosis Severe sepsis due to UTI   Does the patient have one of the following disease processes/diagnoses(primary or secondary)? Sepsis   Does the patient have Home health ordered? No   Is there a DME ordered? No   Prep survey completed? Yes            MASOUD A - Registered Nurse

## 2024-04-09 ENCOUNTER — TRANSITIONAL CARE MANAGEMENT TELEPHONE ENCOUNTER (OUTPATIENT)
Dept: CALL CENTER | Facility: HOSPITAL | Age: 69
End: 2024-04-09
Payer: MEDICARE

## 2024-04-09 LAB
BACTERIA SPEC AEROBE CULT: NORMAL
BACTERIA SPEC AEROBE CULT: NORMAL

## 2024-04-09 NOTE — OUTREACH NOTE
Call Center TCM Note      Flowsheet Row Responses   [facility] [place], operated by [facility] patient discharged from? [facility]   Does the patient have one of the following disease processes/diagnoses(primary or secondary)? Sepsis   TCM attempt successful? Yes  [VR listing Veronica]   Call start time 1217   Call end time 1221   Discharge diagnosis Severe sepsis due to UTI   Meds reviewed with patient/caregiver? Yes   Is the patient having any side effects they believe may be caused by any medication additions or changes? No   Does the patient have all medications related to this admission filled (includes all antibiotics, inhalers, nebulizers,steroids,etc.) Yes   Is the patient taking all medications as directed (includes completed medication regime)? Yes   Comments 4/15/2024  8:00 AM  HOSPITAL FOLLOW UP   Does the patient have an appointment with their PCP within 7-14 days of discharge? Yes   Comments Pt denies further dysuria, hematuria, fever, chills, rigors or other issues.   Did the patient receive a copy of their discharge instructions? Yes   Nursing interventions Reviewed instructions with patient   What is the patient's perception of their health status since discharge? Improving   Nursing interventions Nurse provided patient education   Is the patient/caregiver able to teach back TIME? T emperature - higher or lower than normal, I nfection - may have signs and symptoms of an infection   Nursing interventions Nurse provided patient education   Is patient/caregiver able to teach back steps to recovery at home? Set small, achievable goals for return to baseline health, Rest and regain strength   Is the patient/caregiver able to teach back signs and symptoms of worsening condition: Fever, Shortness of breath/rapid respiratory rate, Altered mental status(confusion/coma)   Is the patient/caregiver able to teach back the hierarchy of who to call/visit for symptoms/problems? PCP, Specialist, Home health nurse, Urgent Care, ED, 911 Yes   TCM  call completed? Yes   Call end time 1221            Cindy Larsen RN    4/9/2024, 12:22 EDT

## 2024-05-07 ENCOUNTER — TELEPHONE (OUTPATIENT)
Dept: FAMILY MEDICINE CLINIC | Facility: CLINIC | Age: 69
End: 2024-05-07
Payer: MEDICARE

## 2024-05-07 RX ORDER — LISINOPRIL AND HYDROCHLOROTHIAZIDE 25; 20 MG/1; MG/1
0.5 TABLET ORAL DAILY
Qty: 90 TABLET | Refills: 3 | Status: SHIPPED | OUTPATIENT
Start: 2024-05-07 | End: 2024-05-08 | Stop reason: SDUPTHER

## 2024-05-07 RX ORDER — LISINOPRIL AND HYDROCHLOROTHIAZIDE 25; 20 MG/1; MG/1
0.5 TABLET ORAL DAILY
Qty: 30 TABLET | Refills: 1 | Status: SHIPPED | OUTPATIENT
Start: 2024-05-07 | End: 2024-05-07 | Stop reason: SDUPTHER

## 2024-05-08 ENCOUNTER — TELEPHONE (OUTPATIENT)
Dept: FAMILY MEDICINE CLINIC | Facility: CLINIC | Age: 69
End: 2024-05-08
Payer: MEDICARE

## 2024-05-08 RX ORDER — LISINOPRIL AND HYDROCHLOROTHIAZIDE 25; 20 MG/1; MG/1
1.5 TABLET ORAL DAILY
Qty: 90 TABLET | Refills: 3 | Status: SHIPPED | OUTPATIENT
Start: 2024-05-08 | End: 2024-05-08 | Stop reason: SDUPTHER

## 2024-05-08 RX ORDER — LISINOPRIL AND HYDROCHLOROTHIAZIDE 25; 20 MG/1; MG/1
1.5 TABLET ORAL DAILY
Qty: 90 TABLET | Refills: 3 | Status: SHIPPED | OUTPATIENT
Start: 2024-05-08

## 2024-05-15 ENCOUNTER — OFFICE VISIT (OUTPATIENT)
Dept: FAMILY MEDICINE CLINIC | Facility: CLINIC | Age: 69
End: 2024-05-15
Payer: MEDICARE

## 2024-05-15 VITALS
HEIGHT: 70 IN | HEART RATE: 67 BPM | RESPIRATION RATE: 18 BRPM | WEIGHT: 257.9 LBS | BODY MASS INDEX: 36.92 KG/M2 | SYSTOLIC BLOOD PRESSURE: 120 MMHG | DIASTOLIC BLOOD PRESSURE: 80 MMHG | TEMPERATURE: 97.8 F | OXYGEN SATURATION: 98 %

## 2024-05-15 DIAGNOSIS — N39.0 UTI (URINARY TRACT INFECTION), BACTERIAL: ICD-10-CM

## 2024-05-15 DIAGNOSIS — N32.9 LESION OF BLADDER: ICD-10-CM

## 2024-05-15 DIAGNOSIS — Z86.711 HISTORY OF PULMONARY EMBOLISM: ICD-10-CM

## 2024-05-15 DIAGNOSIS — I10 PRIMARY HYPERTENSION: Primary | ICD-10-CM

## 2024-05-15 DIAGNOSIS — J30.9 ALLERGIC RHINITIS, UNSPECIFIED SEASONALITY, UNSPECIFIED TRIGGER: ICD-10-CM

## 2024-05-15 DIAGNOSIS — A49.9 UTI (URINARY TRACT INFECTION), BACTERIAL: ICD-10-CM

## 2024-05-15 DIAGNOSIS — R93.5 ABNORMAL FINDINGS ON DIAGNOSTIC IMAGING OF OTHER ABDOMINAL REGIONS, INCLUDING RETROPERITONEUM: ICD-10-CM

## 2024-05-15 DIAGNOSIS — N42.83 CYST OF PROSTATE: ICD-10-CM

## 2024-05-15 DIAGNOSIS — R78.81 BACTEREMIA: ICD-10-CM

## 2024-05-15 DIAGNOSIS — K40.90 NON-RECURRENT UNILATERAL INGUINAL HERNIA WITHOUT OBSTRUCTION OR GANGRENE: ICD-10-CM

## 2024-05-15 DIAGNOSIS — D64.9 ANEMIA, UNSPECIFIED TYPE: ICD-10-CM

## 2024-05-15 DIAGNOSIS — Z90.5 HISTORY OF NEPHRECTOMY: ICD-10-CM

## 2024-05-15 DIAGNOSIS — D35.02 ADRENAL ADENOMA, LEFT: ICD-10-CM

## 2024-05-15 DIAGNOSIS — Z85.528 HISTORY OF RENAL CELL CANCER: ICD-10-CM

## 2024-05-15 LAB
ALBUMIN SERPL-MCNC: 4.1 G/DL (ref 3.5–5.2)
ALBUMIN/GLOB SERPL: 1.3 G/DL
ALP SERPL-CCNC: 88 U/L (ref 39–117)
ALT SERPL W P-5'-P-CCNC: 19 U/L (ref 1–41)
ANION GAP SERPL CALCULATED.3IONS-SCNC: 10 MMOL/L (ref 5–15)
AST SERPL-CCNC: 16 U/L (ref 1–40)
BASOPHILS # BLD AUTO: 0.02 10*3/MM3 (ref 0–0.2)
BASOPHILS NFR BLD AUTO: 0.3 % (ref 0–1.5)
BILIRUB SERPL-MCNC: 0.3 MG/DL (ref 0–1.2)
BUN SERPL-MCNC: 13 MG/DL (ref 8–23)
BUN/CREAT SERPL: 10.2 (ref 7–25)
CALCIUM SPEC-SCNC: 9.1 MG/DL (ref 8.6–10.5)
CHLORIDE SERPL-SCNC: 103 MMOL/L (ref 98–107)
CO2 SERPL-SCNC: 25 MMOL/L (ref 22–29)
CREAT SERPL-MCNC: 1.28 MG/DL (ref 0.76–1.27)
DEPRECATED RDW RBC AUTO: 42.8 FL (ref 37–54)
EGFRCR SERPLBLD CKD-EPI 2021: 61.3 ML/MIN/1.73
EOSINOPHIL # BLD AUTO: 0.2 10*3/MM3 (ref 0–0.4)
EOSINOPHIL NFR BLD AUTO: 2.7 % (ref 0.3–6.2)
ERYTHROCYTE [DISTWIDTH] IN BLOOD BY AUTOMATED COUNT: 12.5 % (ref 12.3–15.4)
FOLATE SERPL-MCNC: >20 NG/ML (ref 4.78–24.2)
GLOBULIN UR ELPH-MCNC: 3.2 GM/DL
GLUCOSE SERPL-MCNC: 97 MG/DL (ref 65–99)
HCT VFR BLD AUTO: 36.1 % (ref 37.5–51)
HGB BLD-MCNC: 12.1 G/DL (ref 13–17.7)
IMM GRANULOCYTES # BLD AUTO: 0.02 10*3/MM3 (ref 0–0.05)
IMM GRANULOCYTES NFR BLD AUTO: 0.3 % (ref 0–0.5)
IRON 24H UR-MRATE: 32 MCG/DL (ref 59–158)
IRON SATN MFR SERPL: 10 % (ref 20–50)
LYMPHOCYTES # BLD AUTO: 1.27 10*3/MM3 (ref 0.7–3.1)
LYMPHOCYTES NFR BLD AUTO: 17 % (ref 19.6–45.3)
MCH RBC QN AUTO: 32 PG (ref 26.6–33)
MCHC RBC AUTO-ENTMCNC: 33.5 G/DL (ref 31.5–35.7)
MCV RBC AUTO: 95.5 FL (ref 79–97)
MONOCYTES # BLD AUTO: 0.78 10*3/MM3 (ref 0.1–0.9)
MONOCYTES NFR BLD AUTO: 10.4 % (ref 5–12)
NEUTROPHILS NFR BLD AUTO: 5.2 10*3/MM3 (ref 1.7–7)
NEUTROPHILS NFR BLD AUTO: 69.3 % (ref 42.7–76)
NRBC BLD AUTO-RTO: 0 /100 WBC (ref 0–0.2)
PLATELET # BLD AUTO: 209 10*3/MM3 (ref 140–450)
PMV BLD AUTO: 10.8 FL (ref 6–12)
POTASSIUM SERPL-SCNC: 4.1 MMOL/L (ref 3.5–5.2)
PROT SERPL-MCNC: 7.3 G/DL (ref 6–8.5)
PSA SERPL-MCNC: 0.61 NG/ML (ref 0–4)
RBC # BLD AUTO: 3.78 10*6/MM3 (ref 4.14–5.8)
SODIUM SERPL-SCNC: 138 MMOL/L (ref 136–145)
TIBC SERPL-MCNC: 307 MCG/DL (ref 298–536)
TRANSFERRIN SERPL-MCNC: 206 MG/DL (ref 200–360)
VIT B12 BLD-MCNC: 575 PG/ML (ref 211–946)
WBC NRBC COR # BLD AUTO: 7.49 10*3/MM3 (ref 3.4–10.8)

## 2024-05-15 PROCEDURE — 84153 ASSAY OF PSA TOTAL: CPT | Performed by: FAMILY MEDICINE

## 2024-05-15 PROCEDURE — 3079F DIAST BP 80-89 MM HG: CPT | Performed by: FAMILY MEDICINE

## 2024-05-15 PROCEDURE — 85025 COMPLETE CBC W/AUTO DIFF WBC: CPT | Performed by: FAMILY MEDICINE

## 2024-05-15 PROCEDURE — 36415 COLL VENOUS BLD VENIPUNCTURE: CPT | Performed by: FAMILY MEDICINE

## 2024-05-15 PROCEDURE — 82746 ASSAY OF FOLIC ACID SERUM: CPT | Performed by: FAMILY MEDICINE

## 2024-05-15 PROCEDURE — 1126F AMNT PAIN NOTED NONE PRSNT: CPT | Performed by: FAMILY MEDICINE

## 2024-05-15 PROCEDURE — 99214 OFFICE O/P EST MOD 30 MIN: CPT | Performed by: FAMILY MEDICINE

## 2024-05-15 PROCEDURE — 3074F SYST BP LT 130 MM HG: CPT | Performed by: FAMILY MEDICINE

## 2024-05-15 PROCEDURE — 80053 COMPREHEN METABOLIC PANEL: CPT | Performed by: FAMILY MEDICINE

## 2024-05-15 PROCEDURE — 82607 VITAMIN B-12: CPT | Performed by: FAMILY MEDICINE

## 2024-05-15 PROCEDURE — 83540 ASSAY OF IRON: CPT | Performed by: FAMILY MEDICINE

## 2024-05-15 PROCEDURE — 84466 ASSAY OF TRANSFERRIN: CPT | Performed by: FAMILY MEDICINE

## 2024-05-15 RX ORDER — FLUTICASONE PROPIONATE 50 MCG
2 SPRAY, SUSPENSION (ML) NASAL DAILY
Qty: 48 G | Refills: 3 | Status: SHIPPED | OUTPATIENT
Start: 2024-05-15

## 2024-05-15 RX ORDER — CETIRIZINE HYDROCHLORIDE 10 MG/1
10 TABLET ORAL DAILY
Qty: 90 TABLET | Refills: 3 | Status: SHIPPED | OUTPATIENT
Start: 2024-05-15

## 2024-05-15 NOTE — PROGRESS NOTES
Chief Complaint  Hospital follow up for UTI/sepsis      Subjective          Facundo Hinds presents to [facility]  History of Present Illness  Patient presents today for hospital follow-up for UTI and sepsis.  He was hospitalized at [facility] in [place], [place] on 4/3/2024 and discharged on 4/8/2024.  His hospitalist was Dr. Uriel Mac.  He is presenting over a month since discharge.  [facility].  Medications have been reviewed and reconciled.  He was hospitalized for severe sepsis due to UTI with fever, tachycardia and lactic acidosis.  His urine grew Staph epidermidis as well as having bacteremia.  He was placed on antibiotics and finished a full 2-week course of Keflex.  He was previously having hematuria but this resolved.  He is [facility] as he was placed back on it.  His blood pressure is now adequately controlled again taking lisinopril/hydrochlorothiazide 20/25 mg daily.  He is on iron supplementation he takes over-the-counter.  Anemia was noted.  He does have follow-up with nephrology, Dr. Hanna.  I reviewed his imaging.  CT of the abdomen pelvis showed that he had a fat-containing right inguinal hernia.  This is not causing any symptoms for him.  There is also some fibrosis noted which was previously noted several years ago.  He reports having had his right kidney removed back in 2004.  He previously had renal cancer.  The left kidney ultrasound showed numerous simple appearing cyst noted in the left kidney.  CT of the abdomen pelvis showed that there was a cystic lesion arising from the prostate gland exerting mass effect on the bladder base which was nonspecific with the differential including abscess, hypodense mass or other cystic prostatic lesion.  There was also some asymmetric bladder wall thickening possibly indicative of cystitis.  Urology was consulted.  He was  "recommended to have outpatient follow-up.  No PSA was checked so I will go ahead and do this.  He does have an appointment to see Dr. Crowe coming up next week.  Patient does report that he will have an MRI done this week for further evaluation as well.  I will hold off on further imaging at this time but I will get a PSA level checked given finding.  He does need a cystoscopy.  CT of the abdomen pelvis also showed that there was a adrenal lesion measuring 2.4 cm.  This was most recently described on MRI of the abdomen as stable.  This was felt to be a benign adenoma based on most recent CT.    Current Outpatient Medications   Medication Instructions    acetaminophen (TYLENOL) 500-1,000 mg, Oral, Every 6 Hours PRN    atorvastatin (LIPITOR) 10 mg, Oral, Daily    cetirizine (ZYRTEC) 10 mg, Oral, Daily    cholecalciferol (VITAMIN D3) 1,000 Units, Oral, Daily    ferrous sulfate 325 mg, Oral, Daily    fluticasone (Flonase) 50 MCG/ACT nasal spray 2 sprays, Nasal, Daily    lisinopril-hydrochlorothiazide (PRINZIDE,ZESTORETIC) 20-25 MG per tablet 1.5 tablets, Oral, Daily    multivitamin with minerals tablet tablet 1 tablet, Oral, Daily       The following portions of the patient's history were reviewed and updated as appropriate: allergies, current medications, past family history, past medical history, past social history, past surgical history, and problem list.    Objective   Vital Signs:   /80 (BP Location: Right arm, Patient Position: Sitting, Cuff Size: Adult)   Pulse 67   Temp 97.8 °F (36.6 °C) (Oral)   Resp 18   Ht 177.8 cm (70\")   Wt 117 kg (257 lb 14.4 oz)   SpO2 98%   BMI 37.00 kg/m²     BP Readings from Last 3 Encounters:   05/15/24 120/80   04/08/24 136/64   01/23/24 130/80     Wt Readings from Last 3 Encounters:   05/15/24 117 kg (257 lb 14.4 oz)   04/03/24 124 kg (272 lb 4.3 oz)   01/23/24 120 kg (265 lb)     Class 2 Severe Obesity (BMI >=35 and <=39.9). Obesity-related health conditions include " the following: hypertension. Obesity is improving with lifestyle modifications. BMI is is above average; BMI management plan is completed. We discussed portion control and increasing exercise.     Physical Exam  Vitals reviewed.   Constitutional:       Appearance: Normal appearance.   HENT:      Head: Normocephalic and atraumatic.      Right Ear: External ear normal.      Left Ear: External ear normal.   Eyes:      Conjunctiva/sclera: Conjunctivae normal.   Cardiovascular:      Rate and Rhythm: Normal rate and regular rhythm.      Heart sounds: No murmur heard.     No friction rub. No gallop.   Pulmonary:      Effort: Pulmonary effort is normal.      Breath sounds: Normal breath sounds. No wheezing or rhonchi.   Abdominal:      General: Bowel sounds are normal. There is no distension.      Palpations: Abdomen is soft.      Tenderness: There is no abdominal tenderness.   Skin:     General: Skin is warm and dry.   Neurological:      Mental Status: He is alert and oriented to person, place, and time.      Cranial Nerves: No cranial nerve deficit.   Psychiatric:         Mood and Affect: Mood and affect normal.         Behavior: Behavior normal.         Thought Content: Thought content normal.         Judgment: Judgment normal.            Result Review :   The following data was reviewed by: Ever Topete DO on 05/15/2024:  Common labs          4/6/2024    03:11 4/7/2024    05:08 4/8/2024    04:41   Common Labs   Glucose 101  92  92    BUN 11  12  11    Creatinine 1.19  1.41  1.26    Sodium 140  139  139    Potassium 3.7  4.0  3.6    Chloride 106  105  105    Calcium 8.4  8.4  8.3    WBC 10.92  9.04  10.05    Hemoglobin 9.1  9.5  9.4    Hematocrit 29.1  29.8  29.6    Platelets 153  189  179             Lab Results   Component Value Date    SARSANTIGEN Not Detected 05/17/2022    COVID19 NOT DETECTED 01/19/2021    RAPFLUA Negative 05/17/2022    RAPFLUB Negative 05/17/2022    INR 1.13 (L) 01/19/2021    BILIRUBINUR   04/03/2024      Comment:      Result not available due to interfering substances.       Procedures        Assessment and Plan    Diagnoses and all orders for this visit:    1. Primary hypertension (Primary)  -     CBC & Differential  -     Comprehensive Metabolic Panel    2. Allergic rhinitis, unspecified seasonality, unspecified trigger  -     cetirizine (zyrTEC) 10 MG tablet; Take 1 tablet by mouth Daily.  Dispense: 90 tablet; Refill: 3    3. UTI (urinary tract infection), bacterial    4. Bacteremia    5. History of pulmonary embolism    6. History of nephrectomy, right    7. Anemia, unspecified type  -     Vitamin B12  -     Folate  -     Iron Profile    8. Lesion of bladder    9. Adrenal adenoma, left    10. Non-recurrent unilateral inguinal hernia without obstruction or gangrene    11. Cyst of prostate  -     PSA DIAGNOSTIC    12. Abnormal findings on diagnostic imaging of other abdominal regions, including retroperitoneum  -     PSA DIAGNOSTIC    13. History of renal cell cancer    Other orders  -     fluticasone (Flonase) 50 MCG/ACT nasal spray; 2 sprays into the nostril(s) as directed by provider Daily.  Dispense: 48 g; Refill: 3    Patient overall doing better at this time.  I did discuss with him seeing him back for his next regular scheduled appointment.  We discussed continuing current management of chronic conditions.  He is encouraged to follow-up with urology as well as nephrology.  Patient instructed to call with any questions or concerns.      Medications Discontinued During This Encounter   Medication Reason    fluticasone (Flonase) 50 MCG/ACT nasal spray Reorder    cetirizine (zyrTEC) 10 MG tablet Reorder          Follow Up   Return if symptoms worsen or fail to improve.  Patient was given instructions and counseling regarding his condition or for health maintenance advice. Please see specific information pulled into the AVS if appropriate.       Ever Topete DO  05/15/24  09:51  EDT

## 2024-06-10 RX ORDER — ATORVASTATIN CALCIUM 10 MG/1
10 TABLET, FILM COATED ORAL DAILY
Qty: 90 TABLET | Refills: 3 | Status: SHIPPED | OUTPATIENT
Start: 2024-06-10

## 2024-07-01 NOTE — TELEPHONE ENCOUNTER
Phone call placed to patient who stated that the doctor in the hospital discontinued his Eliquis due to a bleed and stated that his PCP would start him back. Will follow up with provider.

## 2024-07-01 NOTE — TELEPHONE ENCOUNTER
Please let patient know that I have sent the prescription.  Based on the review of records he was [facility] as [facility] it.  Prescription has been sent to [facility].

## 2024-07-01 NOTE — TELEPHONE ENCOUNTER
Prescription sent to [facility] of [facility]. Informed patient that [facility] stated that they had started him back on it per records.

## 2024-07-01 NOTE — TELEPHONE ENCOUNTER
Caller: Facundo Hinds    Relationship: Self    Best call back number: [phone]    What medication are you requesting: MILLIE      If a prescription is needed, what is your preferred pharmacy and phone number: [facility] - [address] [phone] [facility] [phone] FX     Additional notes:PATIENT HAS BEEN TAKING THIS MEDICATION FOR A LONG TIME BUT IT IS NO LONGER ON HIS MEDICATION LIST   LAST PRESCRIPTION WAS WRITTEN BY VIET CARLIN   HE HAS ONE WEEK LEFT PLEASE CONTACT IF THERE ARE ANY QUESTIONS

## 2024-07-02 ENCOUNTER — TRANSCRIBE ORDERS (OUTPATIENT)
Dept: ADMINISTRATIVE | Facility: HOSPITAL | Age: 69
End: 2024-07-02
Payer: MEDICARE

## 2024-07-02 ENCOUNTER — LAB (OUTPATIENT)
Dept: LAB | Facility: HOSPITAL | Age: 69
End: 2024-07-02
Payer: MEDICARE

## 2024-07-02 DIAGNOSIS — N18.32 STAGE 3B CHRONIC KIDNEY DISEASE: Primary | ICD-10-CM

## 2024-07-02 DIAGNOSIS — C64.1 MALIGNANT NEOPLASM OF RIGHT KIDNEY, EXCEPT RENAL PELVIS: ICD-10-CM

## 2024-07-02 DIAGNOSIS — E55.9 VITAMIN D DEFICIENCY: ICD-10-CM

## 2024-07-02 DIAGNOSIS — I82.402 ACUTE EMBOLISM AND THROMBOSIS OF DEEP VEIN OF LEFT LOWER EXTREMITY: ICD-10-CM

## 2024-07-02 DIAGNOSIS — D64.9 ANEMIA, UNSPECIFIED TYPE: ICD-10-CM

## 2024-07-02 DIAGNOSIS — I10 PRIMARY HYPERTENSION: ICD-10-CM

## 2024-07-02 DIAGNOSIS — E78.2 MIXED HYPERLIPIDEMIA: ICD-10-CM

## 2024-07-02 DIAGNOSIS — N18.32 STAGE 3B CHRONIC KIDNEY DISEASE: ICD-10-CM

## 2024-07-02 DIAGNOSIS — R73.03 PREDIABETES: ICD-10-CM

## 2024-07-02 LAB
25(OH)D3 SERPL-MCNC: 35.9 NG/ML (ref 30–100)
ALBUMIN SERPL-MCNC: 3.9 G/DL (ref 3.5–5.2)
ALBUMIN/GLOB SERPL: 1.1 G/DL
ALP SERPL-CCNC: 97 U/L (ref 39–117)
ALT SERPL W P-5'-P-CCNC: 22 U/L (ref 1–41)
ANION GAP SERPL CALCULATED.3IONS-SCNC: 11.1 MMOL/L (ref 5–15)
AST SERPL-CCNC: 20 U/L (ref 1–40)
BASOPHILS # BLD AUTO: 0.03 10*3/MM3 (ref 0–0.2)
BASOPHILS NFR BLD AUTO: 0.5 % (ref 0–1.5)
BILIRUB SERPL-MCNC: <0.2 MG/DL (ref 0–1.2)
BILIRUB UR QL STRIP: NEGATIVE
BUN SERPL-MCNC: 15 MG/DL (ref 8–23)
BUN/CREAT SERPL: 12 (ref 7–25)
CALCIUM SPEC-SCNC: 9.4 MG/DL (ref 8.6–10.5)
CHLORIDE SERPL-SCNC: 102 MMOL/L (ref 98–107)
CHOLEST SERPL-MCNC: 138 MG/DL (ref 0–200)
CLARITY UR: CLEAR
CO2 SERPL-SCNC: 25.9 MMOL/L (ref 22–29)
COLOR UR: YELLOW
CREAT SERPL-MCNC: 1.25 MG/DL (ref 0.76–1.27)
CREAT UR-MCNC: 127.1 MG/DL
DEPRECATED RDW RBC AUTO: 42.4 FL (ref 37–54)
EGFRCR SERPLBLD CKD-EPI 2021: 63.1 ML/MIN/1.73
EOSINOPHIL # BLD AUTO: 0.18 10*3/MM3 (ref 0–0.4)
EOSINOPHIL NFR BLD AUTO: 3.1 % (ref 0.3–6.2)
ERYTHROCYTE [DISTWIDTH] IN BLOOD BY AUTOMATED COUNT: 12.2 % (ref 12.3–15.4)
GLOBULIN UR ELPH-MCNC: 3.4 GM/DL
GLUCOSE SERPL-MCNC: 102 MG/DL (ref 65–99)
GLUCOSE UR STRIP-MCNC: NEGATIVE MG/DL
HBA1C MFR BLD: 6.4 % (ref 4.8–5.6)
HCT VFR BLD AUTO: 37.9 % (ref 37.5–51)
HDLC SERPL-MCNC: 69 MG/DL (ref 40–60)
HGB BLD-MCNC: 12.5 G/DL (ref 13–17.7)
HGB UR QL STRIP.AUTO: NEGATIVE
IMM GRANULOCYTES # BLD AUTO: 0.01 10*3/MM3 (ref 0–0.05)
IMM GRANULOCYTES NFR BLD AUTO: 0.2 % (ref 0–0.5)
KETONES UR QL STRIP: NEGATIVE
LDLC SERPL CALC-MCNC: 52 MG/DL (ref 0–100)
LDLC/HDLC SERPL: 0.74 {RATIO}
LEUKOCYTE ESTERASE UR QL STRIP.AUTO: NEGATIVE
LYMPHOCYTES # BLD AUTO: 1.59 10*3/MM3 (ref 0.7–3.1)
LYMPHOCYTES NFR BLD AUTO: 27.2 % (ref 19.6–45.3)
MCH RBC QN AUTO: 31.1 PG (ref 26.6–33)
MCHC RBC AUTO-ENTMCNC: 33 G/DL (ref 31.5–35.7)
MCV RBC AUTO: 94.3 FL (ref 79–97)
MONOCYTES # BLD AUTO: 0.57 10*3/MM3 (ref 0.1–0.9)
MONOCYTES NFR BLD AUTO: 9.7 % (ref 5–12)
NEUTROPHILS NFR BLD AUTO: 3.47 10*3/MM3 (ref 1.7–7)
NEUTROPHILS NFR BLD AUTO: 59.3 % (ref 42.7–76)
NITRITE UR QL STRIP: NEGATIVE
NRBC BLD AUTO-RTO: 0 /100 WBC (ref 0–0.2)
PH UR STRIP.AUTO: 6 [PH] (ref 5–8)
PHOSPHATE SERPL-MCNC: 3.2 MG/DL (ref 2.5–4.5)
PLATELET # BLD AUTO: 220 10*3/MM3 (ref 140–450)
PMV BLD AUTO: 10.6 FL (ref 6–12)
POTASSIUM SERPL-SCNC: 3.7 MMOL/L (ref 3.5–5.2)
PROT ?TM UR-MCNC: 8.3 MG/DL
PROT SERPL-MCNC: 7.3 G/DL (ref 6–8.5)
PROT UR QL STRIP: NEGATIVE
PROT/CREAT UR: 0.07 MG/G{CREAT}
PTH-INTACT SERPL-MCNC: 29.4 PG/ML (ref 15–65)
RBC # BLD AUTO: 4.02 10*6/MM3 (ref 4.14–5.8)
SODIUM SERPL-SCNC: 139 MMOL/L (ref 136–145)
SP GR UR STRIP: 1.02 (ref 1–1.03)
TRIGL SERPL-MCNC: 89 MG/DL (ref 0–150)
UROBILINOGEN UR QL STRIP: NORMAL
VLDLC SERPL-MCNC: 17 MG/DL (ref 5–40)
WBC NRBC COR # BLD AUTO: 5.85 10*3/MM3 (ref 3.4–10.8)

## 2024-07-02 PROCEDURE — 81003 URINALYSIS AUTO W/O SCOPE: CPT

## 2024-07-02 PROCEDURE — 82306 VITAMIN D 25 HYDROXY: CPT

## 2024-07-02 PROCEDURE — 80061 LIPID PANEL: CPT

## 2024-07-02 PROCEDURE — 82570 ASSAY OF URINE CREATININE: CPT

## 2024-07-02 PROCEDURE — 80053 COMPREHEN METABOLIC PANEL: CPT

## 2024-07-02 PROCEDURE — 83036 HEMOGLOBIN GLYCOSYLATED A1C: CPT

## 2024-07-02 PROCEDURE — 84156 ASSAY OF PROTEIN URINE: CPT

## 2024-07-02 PROCEDURE — 85025 COMPLETE CBC W/AUTO DIFF WBC: CPT

## 2024-07-02 PROCEDURE — 84100 ASSAY OF PHOSPHORUS: CPT

## 2024-07-02 PROCEDURE — 36415 COLL VENOUS BLD VENIPUNCTURE: CPT

## 2024-07-02 PROCEDURE — 83970 ASSAY OF PARATHORMONE: CPT

## 2024-08-20 ENCOUNTER — OFFICE VISIT (OUTPATIENT)
Dept: FAMILY MEDICINE CLINIC | Facility: CLINIC | Age: 69
End: 2024-08-20
Payer: MEDICARE

## 2024-08-20 VITALS
TEMPERATURE: 98 F | DIASTOLIC BLOOD PRESSURE: 70 MMHG | HEIGHT: 70 IN | OXYGEN SATURATION: 96 % | HEART RATE: 61 BPM | BODY MASS INDEX: 37.34 KG/M2 | SYSTOLIC BLOOD PRESSURE: 112 MMHG | WEIGHT: 260.8 LBS

## 2024-08-20 DIAGNOSIS — E61.1 IRON DEFICIENCY: ICD-10-CM

## 2024-08-20 DIAGNOSIS — D35.02 ADRENAL ADENOMA, LEFT: ICD-10-CM

## 2024-08-20 DIAGNOSIS — E55.9 VITAMIN D DEFICIENCY: ICD-10-CM

## 2024-08-20 DIAGNOSIS — Z86.010 HISTORY OF COLON POLYPS: ICD-10-CM

## 2024-08-20 DIAGNOSIS — N18.31 STAGE 3A CHRONIC KIDNEY DISEASE: ICD-10-CM

## 2024-08-20 DIAGNOSIS — Z90.5 HISTORY OF NEPHRECTOMY: ICD-10-CM

## 2024-08-20 DIAGNOSIS — M06.9 RHEUMATOID ARTHRITIS, INVOLVING UNSPECIFIED SITE, UNSPECIFIED WHETHER RHEUMATOID FACTOR PRESENT: ICD-10-CM

## 2024-08-20 DIAGNOSIS — R73.03 PREDIABETES: ICD-10-CM

## 2024-08-20 DIAGNOSIS — E78.2 MIXED HYPERLIPIDEMIA: ICD-10-CM

## 2024-08-20 DIAGNOSIS — Z86.718 HISTORY OF DVT (DEEP VEIN THROMBOSIS): ICD-10-CM

## 2024-08-20 DIAGNOSIS — Z86.711 HISTORY OF PULMONARY EMBOLISM: ICD-10-CM

## 2024-08-20 DIAGNOSIS — I10 PRIMARY HYPERTENSION: Primary | ICD-10-CM

## 2024-08-20 DIAGNOSIS — D64.9 ANEMIA, UNSPECIFIED TYPE: ICD-10-CM

## 2024-08-20 PROCEDURE — 1126F AMNT PAIN NOTED NONE PRSNT: CPT | Performed by: FAMILY MEDICINE

## 2024-08-20 PROCEDURE — 3078F DIAST BP <80 MM HG: CPT | Performed by: FAMILY MEDICINE

## 2024-08-20 PROCEDURE — 3074F SYST BP LT 130 MM HG: CPT | Performed by: FAMILY MEDICINE

## 2024-08-20 PROCEDURE — 99214 OFFICE O/P EST MOD 30 MIN: CPT | Performed by: FAMILY MEDICINE

## 2024-08-20 NOTE — PROGRESS NOTES
Chief Complaint  Hypertension    Subjective          Facundo Hinds presents to [facility]  Hypertension    Follow-up    Patient presents today to follow-up for hypertension.  Blood pressure has been adequately controlled taking 1-1/2 tablets of lisinopril/hydrochlorothiazide 20/25 mg daily.  He continues to take vitamin D 1000 units daily.  Patient also continues to take iron supplementation daily.  He does report always having had low iron levels.  His hemoglobin does continue to improve.  In early July his hemoglobin had increased up to 12.5.  It was at 9.4 during his hospitalization.  Patient previously had a right nephrectomy and still maintains left kidney.  He does have chronic kidney disease stage IIIa and continues to be followed by nephrology.  He continues to take Eliquis given previous pulmonary embolism as well as left DVT.  Patient was last seen by myself back in May.  A CTA of the abdomen pelvis showed that there was a cystic lesion arising from the prostate gland exerting mass effect on the bladder base which was nonspecific with the differential including abscess, hypodense mass or other cystic prostatic lesion.  There was also some asymmetric bladder wall thickening possibly indicative of cystitis.  Urology was consulted.  He was recommended to have outpatient follow-up.  I did check a PSA level back in May which was normal at 0.612.  He did see Dr. Crowe and had a cystoscopy completed.  He did have a repeat CT of the pelvis completed which showed that the prostate was the upper limits of normal for size and had a heterogenous appearance with the 4 cm nodule superiorly with either enhancing or hyperdense margins impressing upon the bladder floor.  This appears grossly stable.  This is per report.  Patient will continue to follow-up with urology, Dr. Berhane Crowe.  CT of the abdomen pelvis also showed that there was a adrenal lesion measuring 2.4 cm.  This was  "most recently described on MRI of the abdomen as stable.  This was felt to be a benign adenoma based on most recent CT. I did discuss checking labs to see if there is any elevation in hormones including metanephrines, aldosterone, or cortisol.  He continues to take atorvastatin for hyperlipidemia.  His last lipid panel showed his LDL at 52.  This was back in July.    Current Outpatient Medications   Medication Instructions    acetaminophen (TYLENOL) 500-1,000 mg, Oral, Every 6 Hours PRN    apixaban (ELIQUIS) 5 mg, Oral, 2 Times Daily    atorvastatin (LIPITOR) 10 mg, Oral, Daily    cetirizine (ZYRTEC) 10 mg, Oral, Daily    cholecalciferol (VITAMIN D3) 1,000 Units, Oral, Daily    ferrous sulfate 325 mg, Oral, Daily    fluticasone (Flonase) 50 MCG/ACT nasal spray 2 sprays, Nasal, Daily    lisinopril-hydrochlorothiazide (PRINZIDE,ZESTORETIC) 20-25 MG per tablet 1.5 tablets, Oral, Daily    multivitamin with minerals tablet tablet 1 tablet, Oral, Daily       The following portions of the patient's history were reviewed and updated as appropriate: allergies, current medications, past family history, past medical history, past social history, past surgical history, and problem list.    Objective   Vital Signs:   /70   Pulse 61   Temp 98 °F (36.7 °C)   Ht 177.8 cm (70\")   Wt 118 kg (260 lb 12.8 oz)   SpO2 96%   BMI 37.42 kg/m²     BP Readings from Last 3 Encounters:   08/20/24 112/70   05/15/24 120/80   04/08/24 136/64     Wt Readings from Last 3 Encounters:   08/20/24 118 kg (260 lb 12.8 oz)   05/15/24 117 kg (257 lb 14.4 oz)   04/03/24 124 kg (272 lb 4.3 oz)           Physical Exam  Vitals reviewed.   Constitutional:       Appearance: Normal appearance.   HENT:      Head: Normocephalic and atraumatic.      Right Ear: External ear normal.      Left Ear: External ear normal.   Eyes:      Conjunctiva/sclera: Conjunctivae normal.   Cardiovascular:      Rate and Rhythm: Normal rate and regular rhythm.      Heart " sounds: No murmur heard.     No friction rub. No gallop.   Pulmonary:      Effort: Pulmonary effort is normal.      Breath sounds: Normal breath sounds. No wheezing or rhonchi.   Abdominal:      General: Bowel sounds are normal. There is no distension.      Palpations: Abdomen is soft.      Tenderness: There is no abdominal tenderness.   Skin:     General: Skin is warm and dry.   Neurological:      Mental Status: He is alert and oriented to person, place, and time.      Cranial Nerves: No cranial nerve deficit.   Psychiatric:         Mood and Affect: Mood and affect normal.         Behavior: Behavior normal.         Thought Content: Thought content normal.         Judgment: Judgment normal.            Result Review :   The following data was reviewed by: Ever Topete DO on 08/20/2024:  Common labs          4/8/2024    04:41 5/15/2024    09:53 7/2/2024    09:02   Common Labs   Glucose 92  97  102    BUN 11  13  15    Creatinine 1.26  1.28  1.25    Sodium 139  138  139    Potassium 3.6  4.1  3.7    Chloride 105  103  102    Calcium 8.3  9.1  9.4    Albumin  4.1  3.9    Total Bilirubin  0.3  <0.2    Alkaline Phosphatase  88  97    AST (SGOT)  16  20    ALT (SGPT)  19  22    WBC 10.05  7.49  5.85    Hemoglobin 9.4  12.1  12.5    Hematocrit 29.6  36.1  37.9    Platelets 179  209  220    Total Cholesterol   138    Triglycerides   89    HDL Cholesterol   69    LDL Cholesterol    52    Hemoglobin A1C   6.40    PSA  0.612              Lab Results   Component Value Date    SARSANTIGEN Not Detected 05/17/2022    COVID19 NOT DETECTED 01/19/2021    RAPFLUA Negative 05/17/2022    RAPFLUB Negative 05/17/2022    INR 1.13 (L) 01/19/2021    BILIRUBINUR Negative 07/02/2024       Procedures        Assessment and Plan    Diagnoses and all orders for this visit:    1. Primary hypertension (Primary)  -     CBC & Differential; Future  -     Comprehensive Metabolic Panel; Future    2. Mixed hyperlipidemia  -     Lipid Panel;  Future    3. History of pulmonary embolism    4. History of DVT (deep vein thrombosis), left leg    5. Vitamin D deficiency  -     Vitamin D,25-Hydroxy; Future    6. History of nephrectomy    7. Rheumatoid arthritis, involving unspecified site, unspecified whether rheumatoid factor present    8. Stage 3a chronic kidney disease    9. Anemia, unspecified type  -     Iron Profile; Future    10. Iron deficiency  -     Iron Profile; Future    11. Prediabetes  -     Hemoglobin A1c; Future    12. History of colon polyps  Overview:  Added automatically from request for surgery [id number]      13. Adrenal adenoma, left  -     Metanephrines, Frac. Free, Plasma; Future  -     Cortisol - AM; Future  -     Aldosterone / Renin Ratio; Future    Patient overall doing well today.  I did discuss continuing current management of chronic conditions.  He is encouraged to keep follow-up appointments with urology as well as nephrology.  I did discuss with patient continuing Eliquis given previous DVT as well as pulmonary embolism.  I will see him back in 3 months or sooner if needed.  Labs to be done again prior to next appointment.      There are no discontinued medications.       Follow Up   Return in about 3 months (around 11/20/2024) for Hypertension.  Patient was given instructions and counseling regarding his condition or for health maintenance advice. Please see specific information pulled into the AVS if appropriate.       Ever Topete DO  08/20/24  09:25 EDT

## 2024-08-29 ENCOUNTER — PATIENT ROUNDING (BHMG ONLY) (OUTPATIENT)
Dept: FAMILY MEDICINE CLINIC | Facility: CLINIC | Age: 69
End: 2024-08-29
Payer: MEDICARE

## 2024-08-29 NOTE — PROGRESS NOTES
August 29, 2024    Hello, may I speak with Facundo Hinds?    My name is Yaakov Watkins       I am  with [facility]  [address]  [address]  [place] [address]  [phone].    Before we get started may I verify your date of birth? 1955    I am calling to officially welcome you to our practice and ask about your recent visit. Is this a good time to talk? yes    Tell me about your visit with us. What things went well?  The visit went well as usual        We're always looking for ways to make our patients' experiences even better. Do you have recommendations on ways we may improve?  no    Overall were you satisfied with your first visit to our practice? yes       I appreciate you taking the time to speak with me today. Is there anything else I can do for you? no      Thank you, and have a great day.

## 2024-10-02 ENCOUNTER — OFFICE VISIT (OUTPATIENT)
Dept: FAMILY MEDICINE CLINIC | Facility: CLINIC | Age: 69
End: 2024-10-02
Payer: MEDICARE

## 2024-10-02 VITALS
SYSTOLIC BLOOD PRESSURE: 128 MMHG | WEIGHT: 262.5 LBS | DIASTOLIC BLOOD PRESSURE: 70 MMHG | BODY MASS INDEX: 37.58 KG/M2 | TEMPERATURE: 98.1 F | HEART RATE: 58 BPM | HEIGHT: 70 IN | OXYGEN SATURATION: 97 %

## 2024-10-02 DIAGNOSIS — I10 ESSENTIAL HYPERTENSION: Primary | ICD-10-CM

## 2024-10-02 DIAGNOSIS — Z86.718 HISTORY OF DVT (DEEP VEIN THROMBOSIS): ICD-10-CM

## 2024-10-02 DIAGNOSIS — E78.2 MIXED HYPERLIPIDEMIA: ICD-10-CM

## 2024-10-02 DIAGNOSIS — D35.02 ADRENAL ADENOMA, LEFT: ICD-10-CM

## 2024-10-02 DIAGNOSIS — Z86.711 HISTORY OF PULMONARY EMBOLISM: ICD-10-CM

## 2024-10-02 DIAGNOSIS — N18.31 STAGE 3A CHRONIC KIDNEY DISEASE: ICD-10-CM

## 2024-10-02 DIAGNOSIS — Z85.528 HISTORY OF RENAL CELL CANCER: ICD-10-CM

## 2024-10-02 DIAGNOSIS — Z90.5 HISTORY OF NEPHRECTOMY: ICD-10-CM

## 2024-10-02 DIAGNOSIS — E61.1 IRON DEFICIENCY: ICD-10-CM

## 2024-10-02 DIAGNOSIS — R61 NIGHT SWEATS: ICD-10-CM

## 2024-10-02 PROCEDURE — 3078F DIAST BP <80 MM HG: CPT | Performed by: FAMILY MEDICINE

## 2024-10-02 PROCEDURE — 3074F SYST BP LT 130 MM HG: CPT | Performed by: FAMILY MEDICINE

## 2024-10-02 PROCEDURE — 1126F AMNT PAIN NOTED NONE PRSNT: CPT | Performed by: FAMILY MEDICINE

## 2024-10-02 PROCEDURE — 99214 OFFICE O/P EST MOD 30 MIN: CPT | Performed by: FAMILY MEDICINE

## 2024-10-02 NOTE — PROGRESS NOTES
Chief Complaint  Thyroid concern      Subjective          Facundo Hinds presents to [facility]  Follow-up    Patient presents today to discuss her concerns with her thyroid.  [facility]  TSH was within normal limits.  He has had issues with night sweats for many years.  Nothing has helped alleviate his symptoms.  He tries to keep the house cool as well as use a fan at nighttime.  He reports that he is concerned about his thyroid so he will have a scan coming up.  He is encouraged to keep this appointment.  I did discuss with patient getting labs done that we have previously discussed including evaluation for left adrenal adenoma.  This may be contributing to his symptoms as well as we do plan to check metanephrine levels. CT of the abdomen pelvis also showed that there was a adrenal lesion measuring 2.4 cm.  This was most recently described on MRI of the abdomen as stable.  This was felt to be a benign adenoma based on most recent CT. I did discuss checking labs to see if there is any elevation in hormones including metanephrines, aldosterone, or cortisol.  He continues to take atorvastatin for hyperlipidemia.  His last lipid panel showed his LDL at 52.  This was back in July.  Lipid panel has been ordered as well.  Blood pressure has been adequately controlled taking 1-1/2 tablets of lisinopril/hydrochlorothiazide 20/25 mg daily.  He continues to take vitamin D 1000 units daily.  Patient also continues to take iron supplementation daily.  He does report always having had low iron levels.  His last iron level was 58.  The hemoglobin was 12.1 which is similar to the early July level where it was 12.5.  Patient previously had a right nephrectomy and still maintains left kidney.  He does have chronic kidney disease stage IIIa and continues to be followed by nephrology.  He continues to take Eliquis given previous pulmonary embolism as  "well as left DVT.  Patient was last seen by myself back in May.  A CTA of the abdomen pelvis showed that there was a cystic lesion arising from the prostate gland exerting mass effect on the bladder base which was nonspecific with the differential including abscess, hypodense mass or other cystic prostatic lesion.  There was also some asymmetric bladder wall thickening possibly indicative of cystitis.  Urology was consulted.  He was recommended to have outpatient follow-up.  I did check a PSA level back in May which was normal at 0.612.  He did see Dr. Crowe and had a cystoscopy completed.  He did have a repeat CT of the pelvis completed which showed that the prostate was the upper limits of normal for size and had a heterogenous appearance with the 4 cm nodule superiorly with either enhancing or hyperdense margins impressing upon the bladder floor.  This appears grossly stable.  This is per report.  Patient will continue to follow-up with urology, Dr. Berhane Crowe.      Current Outpatient Medications   Medication Instructions    acetaminophen (TYLENOL) 500-1,000 mg, Oral, Every 6 Hours PRN    apixaban (ELIQUIS) 5 mg, Oral, 2 Times Daily    atorvastatin (LIPITOR) 10 mg, Oral, Daily    cetirizine (ZYRTEC) 10 mg, Oral, Daily    cholecalciferol (VITAMIN D3) 1,000 Units, Oral, Daily    ferrous sulfate 325 mg, Oral, Daily    fluticasone (Flonase) 50 MCG/ACT nasal spray 2 sprays, Nasal, Daily    lisinopril-hydrochlorothiazide (PRINZIDE,ZESTORETIC) 20-25 MG per tablet 1.5 tablets, Oral, Daily    multivitamin with minerals tablet tablet 1 tablet, Oral, Daily       The following portions of the patient's history were reviewed and updated as appropriate: allergies, current medications, past family history, past medical history, past social history, past surgical history, and problem list.    Objective   Vital Signs:   /70   Pulse 58   Temp 98.1 °F (36.7 °C)   Ht 177.8 cm (70\")   Wt 119 kg (262 lb 8 oz)   SpO2 97%  "  BMI 37.66 kg/m²     BP Readings from Last 3 Encounters:   10/02/24 128/70   08/20/24 112/70   05/15/24 120/80     Wt Readings from Last 3 Encounters:   10/02/24 119 kg (262 lb 8 oz)   08/20/24 118 kg (260 lb 12.8 oz)   05/15/24 117 kg (257 lb 14.4 oz)           Physical Exam  Vitals reviewed.   Constitutional:       Appearance: Normal appearance.   HENT:      Head: Normocephalic and atraumatic.      Right Ear: External ear normal.      Left Ear: External ear normal.   Eyes:      Conjunctiva/sclera: Conjunctivae normal.   Neck:      Comments: No palpable thyroid abnormality appreciated.  Cardiovascular:      Rate and Rhythm: Normal rate and regular rhythm.      Heart sounds: No murmur heard.     No friction rub. No gallop.   Pulmonary:      Effort: Pulmonary effort is normal.      Breath sounds: Normal breath sounds. No wheezing or rhonchi.   Abdominal:      General: Bowel sounds are normal. There is no distension.      Palpations: Abdomen is soft.      Tenderness: There is no abdominal tenderness.   Skin:     General: Skin is warm and dry.   Neurological:      Mental Status: He is alert and oriented to person, place, and time.      Cranial Nerves: No cranial nerve deficit.   Psychiatric:         Mood and Affect: Mood and affect normal.         Behavior: Behavior normal.         Thought Content: Thought content normal.         Judgment: Judgment normal.            Result Review :   The following data was reviewed by: Ever Topete DO on 10/02/2024:  Common labs          4/8/2024    04:41 5/15/2024    09:53 7/2/2024    09:02   Common Labs   Glucose 92  97  102    BUN 11  13  15    Creatinine 1.26  1.28  1.25    Sodium 139  138  139    Potassium 3.6  4.1  3.7    Chloride 105  103  102    Calcium 8.3  9.1  9.4    Albumin  4.1  3.9    Total Bilirubin  0.3  <0.2    Alkaline Phosphatase  88  97    AST (SGOT)  16  20    ALT (SGPT)  19  22    WBC 10.05  7.49  5.85    Hemoglobin 9.4  12.1  12.5    Hematocrit 29.6  36.1   37.9    Platelets 179  209  220    Total Cholesterol   138    Triglycerides   89    HDL Cholesterol   69    LDL Cholesterol    52    Hemoglobin A1C   6.40    PSA  0.612              Lab Results   Component Value Date    SARSANTIGEN Not Detected 05/17/2022    COVID19 NOT DETECTED 01/19/2021    RAPFLUA Negative 05/17/2022    RAPFLUB Negative 05/17/2022    INR 1.13 (L) 01/19/2021    BILIRUBINUR Negative 07/02/2024       Procedures        Assessment and Plan    Diagnoses and all orders for this visit:    1. Essential hypertension (Primary)    2. History of renal cell cancer    3. History of nephrectomy, right    4. Iron deficiency    5. Stage 3a chronic kidney disease    6. History of pulmonary embolism    7. History of DVT (deep vein thrombosis)    8. Adrenal adenoma, left    9. Mixed hyperlipidemia    10. Night sweats    Plan as documented above.  I discussed with patient getting labs completed at his convenience.  He will have these done tomorrow at the hospital.  I plan to see him back for his next regular scheduled appointment or sooner if needed.  We did discuss continuing current management of chronic conditions at this time.  As far as his anemia is concerned he reports previously having had evaluation including a colonoscopy that was most recently done on 7/26/2023.  He was recommended have follow-up in 5 years.  He did have a 5 mm polyp.      There are no discontinued medications.       Follow Up   Return if symptoms worsen or fail to improve, for Hypertension.  Patient was given instructions and counseling regarding his condition or for health maintenance advice. Please see specific information pulled into the AVS if appropriate.       Ever Topete DO  10/02/24  10:17 EDT

## 2024-10-03 ENCOUNTER — LAB (OUTPATIENT)
Dept: LAB | Facility: HOSPITAL | Age: 69
End: 2024-10-03
Payer: MEDICARE

## 2024-10-03 DIAGNOSIS — D64.9 ANEMIA, UNSPECIFIED TYPE: ICD-10-CM

## 2024-10-03 DIAGNOSIS — D35.02 ADRENAL ADENOMA, LEFT: ICD-10-CM

## 2024-10-03 DIAGNOSIS — E61.1 IRON DEFICIENCY: ICD-10-CM

## 2024-10-03 DIAGNOSIS — E78.2 MIXED HYPERLIPIDEMIA: ICD-10-CM

## 2024-10-03 DIAGNOSIS — E55.9 VITAMIN D DEFICIENCY: ICD-10-CM

## 2024-10-03 DIAGNOSIS — R73.03 PREDIABETES: ICD-10-CM

## 2024-10-03 DIAGNOSIS — I10 PRIMARY HYPERTENSION: ICD-10-CM

## 2024-10-03 LAB
25(OH)D3 SERPL-MCNC: 32.6 NG/ML (ref 30–100)
ALBUMIN SERPL-MCNC: 3.9 G/DL (ref 3.5–5.2)
ALBUMIN/GLOB SERPL: 1.3 G/DL
ALP SERPL-CCNC: 91 U/L (ref 39–117)
ALT SERPL W P-5'-P-CCNC: 22 U/L (ref 1–41)
ANION GAP SERPL CALCULATED.3IONS-SCNC: 10 MMOL/L (ref 5–15)
AST SERPL-CCNC: 19 U/L (ref 1–40)
BASOPHILS # BLD AUTO: 0.03 10*3/MM3 (ref 0–0.2)
BASOPHILS NFR BLD AUTO: 0.5 % (ref 0–1.5)
BILIRUB SERPL-MCNC: 0.3 MG/DL (ref 0–1.2)
BUN SERPL-MCNC: 17 MG/DL (ref 8–23)
BUN/CREAT SERPL: 12.4 (ref 7–25)
CALCIUM SPEC-SCNC: 9.3 MG/DL (ref 8.6–10.5)
CHLORIDE SERPL-SCNC: 104 MMOL/L (ref 98–107)
CHOLEST SERPL-MCNC: 132 MG/DL (ref 0–200)
CO2 SERPL-SCNC: 27 MMOL/L (ref 22–29)
CORTIS AM PEAK SERPL-MCNC: 11.46 MCG/DL (ref 6.02–18.4)
CREAT SERPL-MCNC: 1.37 MG/DL (ref 0.76–1.27)
DEPRECATED RDW RBC AUTO: 44.3 FL (ref 37–54)
EGFRCR SERPLBLD CKD-EPI 2021: 56.5 ML/MIN/1.73
EOSINOPHIL # BLD AUTO: 0.19 10*3/MM3 (ref 0–0.4)
EOSINOPHIL NFR BLD AUTO: 3.1 % (ref 0.3–6.2)
ERYTHROCYTE [DISTWIDTH] IN BLOOD BY AUTOMATED COUNT: 12.8 % (ref 12.3–15.4)
GLOBULIN UR ELPH-MCNC: 3 GM/DL
GLUCOSE SERPL-MCNC: 100 MG/DL (ref 65–99)
HBA1C MFR BLD: 5.9 % (ref 4.8–5.6)
HCT VFR BLD AUTO: 35.8 % (ref 37.5–51)
HDLC SERPL-MCNC: 62 MG/DL (ref 40–60)
HGB BLD-MCNC: 12.2 G/DL (ref 13–17.7)
IMM GRANULOCYTES # BLD AUTO: 0.02 10*3/MM3 (ref 0–0.05)
IMM GRANULOCYTES NFR BLD AUTO: 0.3 % (ref 0–0.5)
IRON 24H UR-MRATE: 39 MCG/DL (ref 59–158)
IRON SATN MFR SERPL: 13 % (ref 20–50)
LDLC SERPL CALC-MCNC: 57 MG/DL (ref 0–100)
LDLC/HDLC SERPL: 0.92 {RATIO}
LYMPHOCYTES # BLD AUTO: 1.31 10*3/MM3 (ref 0.7–3.1)
LYMPHOCYTES NFR BLD AUTO: 21.3 % (ref 19.6–45.3)
MCH RBC QN AUTO: 32.4 PG (ref 26.6–33)
MCHC RBC AUTO-ENTMCNC: 34.1 G/DL (ref 31.5–35.7)
MCV RBC AUTO: 95.2 FL (ref 79–97)
MONOCYTES # BLD AUTO: 0.66 10*3/MM3 (ref 0.1–0.9)
MONOCYTES NFR BLD AUTO: 10.7 % (ref 5–12)
NEUTROPHILS NFR BLD AUTO: 3.95 10*3/MM3 (ref 1.7–7)
NEUTROPHILS NFR BLD AUTO: 64.1 % (ref 42.7–76)
NRBC BLD AUTO-RTO: 0 /100 WBC (ref 0–0.2)
PLATELET # BLD AUTO: 195 10*3/MM3 (ref 140–450)
PMV BLD AUTO: 10.9 FL (ref 6–12)
POTASSIUM SERPL-SCNC: 3.7 MMOL/L (ref 3.5–5.2)
PROT SERPL-MCNC: 6.9 G/DL (ref 6–8.5)
RBC # BLD AUTO: 3.76 10*6/MM3 (ref 4.14–5.8)
SODIUM SERPL-SCNC: 141 MMOL/L (ref 136–145)
TIBC SERPL-MCNC: 289 MCG/DL (ref 298–536)
TRANSFERRIN SERPL-MCNC: 194 MG/DL (ref 200–360)
TRIGL SERPL-MCNC: 64 MG/DL (ref 0–150)
VLDLC SERPL-MCNC: 13 MG/DL (ref 5–40)
WBC NRBC COR # BLD AUTO: 6.16 10*3/MM3 (ref 3.4–10.8)

## 2024-10-03 PROCEDURE — 36415 COLL VENOUS BLD VENIPUNCTURE: CPT

## 2024-10-03 PROCEDURE — 83835 ASSAY OF METANEPHRINES: CPT

## 2024-10-03 PROCEDURE — 85025 COMPLETE CBC W/AUTO DIFF WBC: CPT

## 2024-10-03 PROCEDURE — 82306 VITAMIN D 25 HYDROXY: CPT

## 2024-10-03 PROCEDURE — 84466 ASSAY OF TRANSFERRIN: CPT

## 2024-10-03 PROCEDURE — 83540 ASSAY OF IRON: CPT

## 2024-10-03 PROCEDURE — 82533 TOTAL CORTISOL: CPT

## 2024-10-03 PROCEDURE — 80053 COMPREHEN METABOLIC PANEL: CPT

## 2024-10-03 PROCEDURE — 84244 ASSAY OF RENIN: CPT

## 2024-10-03 PROCEDURE — 82088 ASSAY OF ALDOSTERONE: CPT

## 2024-10-03 PROCEDURE — 83036 HEMOGLOBIN GLYCOSYLATED A1C: CPT

## 2024-10-03 PROCEDURE — 80061 LIPID PANEL: CPT

## 2024-10-07 LAB
METANEPH FREE SERPL-MCNC: 50 PG/ML (ref 0–88)
NORMETANEPHRINE SERPL-MCNC: 49.3 PG/ML (ref 0–285.2)

## 2024-10-08 LAB
ALDOST SERPL-MCNC: 4 NG/DL (ref 0–30)
ALDOST/RENIN PLAS-RTO: 3.9 {RATIO} (ref 0–30)
RENIN PLAS-CCNC: 1.03 NG/ML/HR (ref 0.17–5.38)

## 2024-10-28 RX ORDER — LISINOPRIL AND HYDROCHLOROTHIAZIDE 20; 25 MG/1; MG/1
1.5 TABLET ORAL DAILY
Qty: 90 TABLET | Refills: 3 | Status: SHIPPED | OUTPATIENT
Start: 2024-10-28

## 2024-11-18 ENCOUNTER — OFFICE VISIT (OUTPATIENT)
Dept: FAMILY MEDICINE CLINIC | Facility: CLINIC | Age: 69
End: 2024-11-18
Payer: MEDICARE

## 2024-11-18 VITALS
OXYGEN SATURATION: 96 % | DIASTOLIC BLOOD PRESSURE: 70 MMHG | TEMPERATURE: 98.1 F | SYSTOLIC BLOOD PRESSURE: 110 MMHG | WEIGHT: 262.4 LBS | BODY MASS INDEX: 37.56 KG/M2 | HEART RATE: 61 BPM | HEIGHT: 70 IN

## 2024-11-18 DIAGNOSIS — R61 NIGHT SWEATS: ICD-10-CM

## 2024-11-18 DIAGNOSIS — E55.9 VITAMIN D DEFICIENCY: ICD-10-CM

## 2024-11-18 DIAGNOSIS — D35.02 ADRENAL ADENOMA, LEFT: ICD-10-CM

## 2024-11-18 DIAGNOSIS — Z86.711 HISTORY OF PULMONARY EMBOLISM: ICD-10-CM

## 2024-11-18 DIAGNOSIS — Z86.718 HISTORY OF DVT (DEEP VEIN THROMBOSIS): ICD-10-CM

## 2024-11-18 DIAGNOSIS — E78.2 MIXED HYPERLIPIDEMIA: ICD-10-CM

## 2024-11-18 DIAGNOSIS — I10 ESSENTIAL HYPERTENSION: Primary | ICD-10-CM

## 2024-11-18 DIAGNOSIS — E04.1 THYROID NODULE: ICD-10-CM

## 2024-11-18 DIAGNOSIS — Z51.81 MEDICATION MONITORING ENCOUNTER: ICD-10-CM

## 2024-11-18 DIAGNOSIS — E61.1 IRON DEFICIENCY: ICD-10-CM

## 2024-11-18 DIAGNOSIS — R73.03 PREDIABETES: ICD-10-CM

## 2024-11-18 DIAGNOSIS — D64.9 ANEMIA, UNSPECIFIED TYPE: ICD-10-CM

## 2024-11-18 PROCEDURE — 1126F AMNT PAIN NOTED NONE PRSNT: CPT | Performed by: FAMILY MEDICINE

## 2024-11-18 PROCEDURE — 99214 OFFICE O/P EST MOD 30 MIN: CPT | Performed by: FAMILY MEDICINE

## 2024-11-18 PROCEDURE — 3078F DIAST BP <80 MM HG: CPT | Performed by: FAMILY MEDICINE

## 2024-11-18 PROCEDURE — 3074F SYST BP LT 130 MM HG: CPT | Performed by: FAMILY MEDICINE

## 2024-11-18 NOTE — PROGRESS NOTES
"Chief Complaint  Hypertension (Follow up /No concerns/)    Subjective          Facundo Hidns presents to [facility]    History of Present Illness     History of Present Illness  The patient is a 69-year-old male who presents today for a follow-up appointment.    He reports that his blood pressure has been well-controlled. His current medication regimen includes 1.5 tablets of lisinopril/hydrochlorothiazide 20/25 mg daily, vitamin D 1000 units, and an iron supplement. He is under the care of Dr. Berhane Crowe, a urologist, for prostate issues, with no cancer detected to date. He also mentions experiencing night sweats for the past 8 years. His next appointment with Dr. Crowe is scheduled for the summer.         Current Outpatient Medications   Medication Instructions   • acetaminophen (TYLENOL) 500-1,000 mg, Every 6 Hours PRN   • apixaban (ELIQUIS) 5 mg, Oral, 2 Times Daily   • atorvastatin (LIPITOR) 10 mg, Oral, Daily   • cetirizine (ZYRTEC) 10 mg, Oral, Daily   • cholecalciferol (VITAMIN D3) 1,000 Units, Daily   • ferrous sulfate 325 mg, Daily   • fluticasone (Flonase) 50 MCG/ACT nasal spray 2 sprays, Nasal, Daily   • lisinopril-hydrochlorothiazide (PRINZIDE,ZESTORETIC) 20-25 MG per tablet 1.5 tablets, Oral, Daily   • multivitamin with minerals tablet tablet 1 tablet, Daily       The following portions of the patient's history were reviewed and updated as appropriate: allergies, current medications, past family history, past medical history, past social history, past surgical history, and problem list.    Objective   Vital Signs:   /70 (BP Location: Right arm, Patient Position: Sitting, Cuff Size: Adult)   Pulse 61   Temp 98.1 °F (36.7 °C) (Oral)   Ht 177.8 cm (70\")   Wt 119 kg (262 lb 6.4 oz)   SpO2 96%   BMI 37.65 kg/m²     BP Readings from Last 3 Encounters:   11/18/24 110/70   10/02/24 128/70   08/20/24 112/70     Wt Readings from Last 3 Encounters:   11/18/24 " 119 kg (262 lb 6.4 oz)   10/02/24 119 kg (262 lb 8 oz)   08/20/24 118 kg (260 lb 12.8 oz)           Physical Exam  Vitals reviewed.   Constitutional:       Appearance: Normal appearance.   HENT:      Head: Normocephalic and atraumatic.      Right Ear: External ear normal.      Left Ear: External ear normal.   Eyes:      Conjunctiva/sclera: Conjunctivae normal.   Cardiovascular:      Rate and Rhythm: Normal rate and regular rhythm.      Heart sounds: No murmur heard.     No friction rub. No gallop.   Pulmonary:      Effort: Pulmonary effort is normal.      Breath sounds: Normal breath sounds. No wheezing or rhonchi.   Abdominal:      General: Bowel sounds are normal. There is no distension.      Palpations: Abdomen is soft.      Tenderness: There is no abdominal tenderness.   Skin:     General: Skin is warm and dry.   Neurological:      Mental Status: He is alert and oriented to person, place, and time.      Cranial Nerves: No cranial nerve deficit.   Psychiatric:         Mood and Affect: Mood and affect normal.         Behavior: Behavior normal.         Thought Content: Thought content normal.         Judgment: Judgment normal.          Result Review :   The following data was reviewed by: Ever Topete DO on 11/18/2024:  Common labs          5/15/2024    09:53 7/2/2024    09:02 10/3/2024    08:31   Common Labs   Glucose 97  102  100    BUN 13  15  17    Creatinine 1.28  1.25  1.37    Sodium 138  139  141    Potassium 4.1  3.7  3.7    Chloride 103  102  104    Calcium 9.1  9.4  9.3    Albumin 4.1  3.9  3.9    Total Bilirubin 0.3  <0.2  0.3    Alkaline Phosphatase 88  97  91    AST (SGOT) 16  20  19    ALT (SGPT) 19  22  22    WBC 7.49  5.85  6.16    Hemoglobin 12.1  12.5  12.2    Hematocrit 36.1  37.9  35.8    Platelets 209  220  195    Total Cholesterol  138  132    Triglycerides  89  64    HDL Cholesterol  69  62    LDL Cholesterol   52  57    Hemoglobin A1C  6.40  5.90    PSA 0.612               Lab Results    Component Value Date    SARSANTIGEN Not Detected 05/17/2022    COVID19 NOT DETECTED 01/19/2021    RAPFLUA Negative 05/17/2022    RAPFLUB Negative 05/17/2022    INR 1.13 (L) 01/19/2021    BILIRUBINUR Negative 07/02/2024       Results  Laboratory Studies  Aldosterone levels were normal. Cortisol level was normal. Metanephrine level was normal. Vitamin D level was 32. A1c was 5.9%. LDL was 57, HDL was 62. Kidney function was slightly off but stable. Calcium levels were normal. Liver enzymes were normal. Hemoglobin was 12.2. Iron levels were 39.    Imaging  Adrenal lesion on the left measuring 2.4 cm, described as stable and benign. TI-RADS three nodule in the left thyroid measuring up to 0.5 cm.    Procedures        Assessment and Plan    Diagnoses and all orders for this visit:    1. Essential hypertension (Primary)  -     CBC & Differential; Future  -     Comprehensive Metabolic Panel; Future    2. Vitamin D deficiency  -     Vitamin D,25-Hydroxy; Future    3. Iron deficiency  -     Iron Profile; Future    4. History of pulmonary embolism    5. History of DVT (deep vein thrombosis)    6. Adrenal adenoma, left    7. Mixed hyperlipidemia  -     Lipid Panel; Future    8. Prediabetes  -     Hemoglobin A1c; Future    9. Medication monitoring encounter    10. Anemia, unspecified type  -     CBC & Differential; Future  -     Comprehensive Metabolic Panel; Future  -     Iron Profile; Future    11. Thyroid nodule    12. Night sweats        Assessment & Plan  1. Chronic Kidney Disease, Stage IIIa.  He is currently being followed by nephrology and has a right nephrectomy. Kidney function is stable compared to previous results. Continue current management and follow-up with nephrology.    2. Hypertension.  Blood pressure is well-controlled on lisinopril hydrochlorothiazide 20/25 mg daily. Continue current medication regimen.    3. History of Pulmonary Embolism and Left DVT.  He is on Eliquis for anticoagulation. Continue  current medication regimen.    4. Adrenal Adenoma.  The adrenal lesion on the left measures 2.4 cm and appears benign. Hormone levels including aldosterone, cortisol, and metanephrines are normal.  No further workup planned at this time.  Adenoma appeared stable.    5. Anemia.  Anemia is stable at 12.2. Iron level is 39. He is advised to continue taking his iron supplement. If iron levels remain low, consider referral to a hematologist for possible iron infusions.    6. Hyperlipidemia.  LDL is 57 and HDL is 62. Continue atorvastatin for cholesterol management.    7. Pre-diabetes.  A1c has improved from 6.4 to 5.9. He is advised to reduce carbohydrate and sugar intake to prevent the development of diabetes.    8. Thyroid Nodule.  A TI-RADS 3 nodule in the left thyroid measures 0.5 cm. Follow-up imaging is not required unless new or clinically suspicious findings arise. Thyroid levels are normal.    9. Night Sweats.  Night sweats have been ongoing for about 8 years. Hormone levels and thyroid levels are normal.  We discussed his symptoms.  They are manageable at this time.  We discussed considering checking testosterone levels however he is not a good candidate for testosterone given previous DVT as well as PE.  If symptoms worsen he is instructed to let us know.    10. Health Maintenance.  Continue vitamin D supplementation. Vitamin D level is satisfactory at 32. Labs including iron, CBC, CMP, A1c, lipid, and vitamin D will be included in the next tests.    Follow-up  Return in 3 months for follow-up.       There are no discontinued medications.       Follow Up   Return in about 3 months (around 2/18/2025) for Hypertension, Medicare Wellness.  Patient was given instructions and counseling regarding his condition or for health maintenance advice. Please see specific information pulled into the AVS if appropriate.     Patient or patient representative verbalized consent for the use of Ambient Listening during the visit  with  Ever Topete DO for chart documentation. 11/18/2024  10:32 EST    Ever Topete DO  11/18/24  10:49 EST

## 2025-02-24 ENCOUNTER — OFFICE VISIT (OUTPATIENT)
Dept: FAMILY MEDICINE CLINIC | Facility: CLINIC | Age: 70
End: 2025-02-24
Payer: MEDICARE

## 2025-02-24 VITALS
HEART RATE: 83 BPM | TEMPERATURE: 98.2 F | SYSTOLIC BLOOD PRESSURE: 126 MMHG | BODY MASS INDEX: 38.47 KG/M2 | HEIGHT: 70 IN | OXYGEN SATURATION: 94 % | WEIGHT: 268.7 LBS | DIASTOLIC BLOOD PRESSURE: 82 MMHG

## 2025-02-24 DIAGNOSIS — U07.1 COVID-19 VIRUS INFECTION: ICD-10-CM

## 2025-02-24 DIAGNOSIS — R05.1 ACUTE COUGH: ICD-10-CM

## 2025-02-24 DIAGNOSIS — R51.9 ACUTE INTRACTABLE HEADACHE, UNSPECIFIED HEADACHE TYPE: ICD-10-CM

## 2025-02-24 DIAGNOSIS — Z00.00 MEDICARE ANNUAL WELLNESS VISIT, SUBSEQUENT: Primary | ICD-10-CM

## 2025-02-24 DIAGNOSIS — E55.9 VITAMIN D DEFICIENCY: ICD-10-CM

## 2025-02-24 DIAGNOSIS — R73.03 PREDIABETES: ICD-10-CM

## 2025-02-24 DIAGNOSIS — I10 ESSENTIAL HYPERTENSION: ICD-10-CM

## 2025-02-24 DIAGNOSIS — N40.0 ENLARGED PROSTATE: ICD-10-CM

## 2025-02-24 DIAGNOSIS — E61.1 IRON DEFICIENCY: ICD-10-CM

## 2025-02-24 DIAGNOSIS — Z86.718 HISTORY OF DVT (DEEP VEIN THROMBOSIS): ICD-10-CM

## 2025-02-24 LAB
EXPIRATION DATE: ABNORMAL
FLUAV AG UPPER RESP QL IA.RAPID: NOT DETECTED
FLUBV AG UPPER RESP QL IA.RAPID: NOT DETECTED
INTERNAL CONTROL: ABNORMAL
Lab: ABNORMAL
SARS-COV-2 AG UPPER RESP QL IA.RAPID: DETECTED

## 2025-02-24 PROCEDURE — G0439 PPPS, SUBSEQ VISIT: HCPCS | Performed by: FAMILY MEDICINE

## 2025-02-24 PROCEDURE — 96160 PT-FOCUSED HLTH RISK ASSMT: CPT | Performed by: FAMILY MEDICINE

## 2025-02-24 PROCEDURE — 99213 OFFICE O/P EST LOW 20 MIN: CPT | Performed by: FAMILY MEDICINE

## 2025-02-24 PROCEDURE — 3074F SYST BP LT 130 MM HG: CPT | Performed by: FAMILY MEDICINE

## 2025-02-24 PROCEDURE — 3079F DIAST BP 80-89 MM HG: CPT | Performed by: FAMILY MEDICINE

## 2025-02-24 PROCEDURE — 1170F FXNL STATUS ASSESSED: CPT | Performed by: FAMILY MEDICINE

## 2025-02-24 PROCEDURE — 1125F AMNT PAIN NOTED PAIN PRSNT: CPT | Performed by: FAMILY MEDICINE

## 2025-02-24 PROCEDURE — 87428 SARSCOV & INF VIR A&B AG IA: CPT | Performed by: FAMILY MEDICINE

## 2025-02-24 RX ORDER — TAMSULOSIN HYDROCHLORIDE 0.4 MG/1
1 CAPSULE ORAL DAILY
COMMUNITY

## 2025-02-24 RX ORDER — FINASTERIDE 5 MG/1
5 TABLET, FILM COATED ORAL DAILY
COMMUNITY

## 2025-02-24 NOTE — PROGRESS NOTES
Subjective   The ABCs of the Annual Wellness Visit  Medicare Wellness Visit      Facundo Hinds is a 69 y.o. patient who presents for a Medicare Wellness Visit.    The following portions of the patient's history were reviewed and   updated as appropriate: allergies, current medications, past family history, past medical history, past social history, past surgical history, and problem list.    Compared to one year ago, the patient's physical   health is the same.  Compared to one year ago, the patient's mental   health is better.    Recent Hospitalizations:  This patient has had a [facility] admission record on file within the last 365 days.  Current Medical Providers:  Patient Care Team:  Ever Topete DO as PCP - General (Family Medicine)    Outpatient Medications Prior to Visit   Medication Sig Dispense Refill    acetaminophen (TYLENOL) 500 MG tablet Take 1-2 tablets by mouth Every 6 (Six) Hours As Needed for Mild Pain.      apixaban (ELIQUIS) 5 MG tablet tablet Take 1 tablet by mouth 2 (Two) Times a Day. 180 tablet 1    atorvastatin (Lipitor) 10 MG tablet Take 1 tablet by mouth Daily. 90 tablet 3    cetirizine (zyrTEC) 10 MG tablet Take 1 tablet by mouth Daily. 90 tablet 3    Cholecalciferol 25 MCG (1000 UT) tablet Take 1 tablet by mouth Daily.      ferrous sulfate 325 (65 FE) MG tablet Take 1 tablet by mouth Daily.      finasteride (PROSCAR) 5 MG tablet Take 1 tablet by mouth Daily.      fluticasone (Flonase) 50 MCG/ACT nasal spray 2 sprays into the nostril(s) as directed by provider Daily. 48 g 3    lisinopril-hydrochlorothiazide (PRINZIDE,ZESTORETIC) 20-25 MG per tablet Take 1.5 tablets by mouth Daily. 90 tablet 3    multivitamin with minerals tablet tablet Take 1 tablet by mouth Daily.      tamsulosin (FLOMAX) 0.4 MG capsule 24 hr capsule Take 1 capsule by mouth Daily.       No facility-administered medications prior to visit.     No opioid medication identified on active medication list. I have  "reviewed chart for other potential  high risk medication/s and harmful drug interactions in the elderly.      Aspirin is not on active medication list.  Aspirin use is not indicated based on review of current medical condition/s. Risk of harm outweighs potential benefits.  .    Patient Active Problem List   Diagnosis    Cancer    Deafness    EKG abnormalities    Hemorrhoids    Hypertension    Stage 3a chronic kidney disease    Night sweats    Shortness of breath    Sinus trouble    Long term (current) use of anticoagulants    Obesity due to excess calories    History of pulmonary embolism    History of DVT (deep vein thrombosis)    Kidney mass    Mixed hyperlipidemia    Encounter for screening for malignant neoplasm of colon    History of colon polyps    History of nephrectomy, right    Screening for colon cancer    Hematuria    UTI (urinary tract infection), bacterial    Lesion of bladder     Advance Care Planning Advance Directive is not on file.  ACP discussion was held with the patient during this visit. Patient does not have an advance directive, information provided.            Objective   Vitals:    02/24/25 1044   BP: 126/82   Pulse: 83   Temp: 98.2 °F (36.8 °C)   TempSrc: Oral   SpO2: 94%   Weight: 122 kg (268 lb 11.2 oz)   Height: 177.8 cm (70\")   PainSc: 6    PainLoc: Head       Estimated body mass index is 38.55 kg/m² as calculated from the following:    Height as of this encounter: 177.8 cm (70\").    Weight as of this encounter: 122 kg (268 lb 11.2 oz).                Does the patient have evidence of cognitive impairment? No                                                                                                Health  Risk Assessment    Smoking Status:  Social History     Tobacco Use   Smoking Status Never    Passive exposure: Past   Smokeless Tobacco Never     Alcohol Consumption:  Social History     Substance and Sexual Activity   Alcohol Use Never       Fall Risk Screen  STEADI Fall Risk " Assessment was completed, and patient is at LOW risk for falls.Assessment completed on:2025    Depression Screening   Little interest or pleasure in doing things? Not at all   Feeling down, depressed, or hopeless? Not at all   PHQ-2 Total Score 0      Health Habits and Functional and Cognitive Screenin/24/2025    10:57 AM   Functional & Cognitive Status   Do you have difficulty preparing food and eating? No   Do you have difficulty bathing yourself, getting dressed or grooming yourself? No   Do you have difficulty using the toilet? No   Do you have difficulty moving around from place to place? No   Do you have trouble with steps or getting out of a bed or a chair? No   Current Diet Limited Junk Food   Dental Exam Up to date   Eye Exam Up to date   Exercise (times per week) 1 times per week   Current Exercises Include Walking   Do you need help using the phone?  No   Are you deaf or do you have serious difficulty hearing?  No   Do you need help to go to places out of walking distance? No   Do you need help shopping? No   Do you need help preparing meals?  No   Do you need help with housework?  No   Do you need help with laundry? No   Do you need help taking your medications? No   Do you need help managing money? No   Do you ever drive or ride in a car without wearing a seat belt? No   Have you felt unusual stress, anger or loneliness in the last month? No   Who do you live with? Spouse   If you need help, do you have trouble finding someone available to you? No   Have you been bothered in the last four weeks by sexual problems? No   Do you have difficulty concentrating, remembering or making decisions? No           Age-appropriate Screening Schedule:  Refer to the list below for future screening recommendations based on patient's age, sex and/or medical conditions. Orders for these recommended tests are listed in the plan section. The patient has been provided with a written plan.    Health Maintenance  List  Health Maintenance   Topic Date Due    COVID-19 Vaccine (1 - 2024-25 season) Never done    INFLUENZA VACCINE  03/31/2025 (Originally 7/1/2024)    BMI FOLLOWUP  05/15/2025    LIPID PANEL  10/03/2025    ANNUAL WELLNESS VISIT  02/24/2026    COLORECTAL CANCER SCREENING  07/26/2028    TDAP/TD VACCINES (3 - Td or Tdap) 10/17/2033    HEPATITIS C SCREENING  Completed    Pneumococcal Vaccine 50+  Completed    ZOSTER VACCINE  Completed                                                                                                                                                CMS Preventative Services Quick Reference  Risk Factors Identified During Encounter  Polypharmacy: Medication List reviewed    The above risks/problems have been discussed with the patient.  Pertinent information has been shared with the patient in the After Visit Summary.  An After Visit Summary and PPPS were made available to the patient.    Follow Up:   Next Medicare Wellness visit to be scheduled in 1 year.          Additional E&M Note during same encounter follows:  Patient has multiple medical problems which are significant and separately identifiable that require additional work above and beyond the Medicare Wellness Visit.      Chief Complaint  Cough, Headache, Generalized Body Aches, and Medicare Wellness-subsequent    Facundo Hinds is a 69 y.o. male who presents to [facility]     History of Present Illness  The patient is a 69-year-old male who presents today for a Medicare annual wellness visit and for evaluation of cough.    He reports no significant change in his physical health compared to the previous year but notes an improvement in his mental health. He does not have an advanced directive in place. He maintains independence in his daily activities and work. He continues to take hydrochlorothiazide-lisinopril 1.5 tablets, vitamin D, and iron supplements. He is under the care of michelet Siddiqui  "urologist, for prostate management. He has no diagnosis of prostate cancer. He is currently on finasteride and Flomax for prostate enlargement, which have been effective in reducing the size of the prostate and improving urinary function.    He has been experiencing a cough and congestion for the past few days, accompanied by fever. He reports no sore throat but does experience sinus tenderness on the right side. He has been managing these symptoms with over-the-counter medications. He reports no loss of taste or smell. He had contact with his granddaughter who had a cold.    He is currently on Eliquis 5 mg twice daily for anticoagulation therapy due to a history of 2 major blood clots.    He is also on atorvastatin 10 mg daily for cholesterol management.    MEDICATIONS  Current: Atorvastatin, Eliquis, hydrochlorothiazide-lisinopril, vitamin D, iron, finasteride, Flomax.    Objective   Vital Signs:   Vitals:    02/24/25 1044   BP: 126/82   Pulse: 83   Temp: 98.2 °F (36.8 °C)   TempSrc: Oral   SpO2: 94%   Weight: 122 kg (268 lb 11.2 oz)   Height: 177.8 cm (70\")   PainSc: 6    PainLoc: Head       Wt Readings from Last 3 Encounters:   02/24/25 122 kg (268 lb 11.2 oz)   11/18/24 119 kg (262 lb 6.4 oz)   10/02/24 119 kg (262 lb 8 oz)     BP Readings from Last 3 Encounters:   02/24/25 126/82   11/18/24 110/70   10/02/24 128/70       Physical Exam  Vitals reviewed.   Constitutional:       Appearance: Normal appearance.   HENT:      Head: Normocephalic and atraumatic.      Right Ear: External ear normal.      Left Ear: External ear normal.      Nose: Congestion present.      Mouth/Throat:      Mouth: Mucous membranes are moist.      Pharynx: Oropharynx is clear.      Comments: Sinus tenderness to palpation over the right maxillary sinus.  Eyes:      Conjunctiva/sclera: Conjunctivae normal.   Cardiovascular:      Rate and Rhythm: Normal rate and regular rhythm.      Heart sounds: No murmur heard.     No friction rub. No " gallop.   Pulmonary:      Effort: Pulmonary effort is normal.      Breath sounds: Normal breath sounds. No wheezing or rhonchi.   Abdominal:      General: Bowel sounds are normal. There is no distension.      Palpations: Abdomen is soft.      Tenderness: There is no abdominal tenderness.   Skin:     General: Skin is warm and dry.   Neurological:      Mental Status: He is alert and oriented to person, place, and time.      Cranial Nerves: No cranial nerve deficit.   Psychiatric:         Mood and Affect: Mood and affect normal.         Behavior: Behavior normal.         Thought Content: Thought content normal.         Judgment: Judgment normal.         Physical Exam  Oral exam was performed.  Lungs are clear with good air movement and no wheezing.  Heart sounds are normal.    Vital Signs  Blood pressure is normal.    The following data was reviewed by Ever Topete DO on 02/24/2025  Common Labs   Common labs          5/15/2024    09:53 7/2/2024    09:02 10/3/2024    08:31   Common Labs   Glucose 97  102  100    BUN 13  15  17    Creatinine 1.28  1.25  1.37    Sodium 138  139  141    Potassium 4.1  3.7  3.7    Chloride 103  102  104    Calcium 9.1  9.4  9.3    Albumin 4.1  3.9  3.9    Total Bilirubin 0.3  <0.2  0.3    Alkaline Phosphatase 88  97  91    AST (SGOT) 16  20  19    ALT (SGPT) 19  22  22    WBC 7.49  5.85  6.16    Hemoglobin 12.1  12.5  12.2    Hematocrit 36.1  37.9  35.8    Platelets 209  220  195    Total Cholesterol  138  132    Triglycerides  89  64    HDL Cholesterol  69  62    LDL Cholesterol   52  57    Hemoglobin A1C  6.40  5.90    PSA 0.612          Results          Assessment & Plan   Diagnoses and all orders for this visit:    1. Medicare annual wellness visit, subsequent (Primary)    2. Acute cough  -     POCT SARS-CoV-2 Antigen THEODORE + Flu    3. Acute intractable headache, unspecified headache type  -     POCT SARS-CoV-2 Antigen THEODORE + Flu    4. COVID-19 virus infection    5. History of DVT  (deep vein thrombosis)    6. Essential hypertension    7. Enlarged prostate    8. Iron deficiency    9. Vitamin D deficiency    10. Prediabetes    Other orders  -     Nirmatrelvir & Ritonavir, 300mg/100mg, (PAXLOVID); Take 2 tablets by mouth 2 (Two) Times a Day.  Dispense: 30 each; Refill: 0        Assessment & Plan  1. Medicare annual wellness visit.  His blood pressure readings are within the normal range. He is currently on hydrochlorothiazide-lisinopril 1.5 tablets daily, vitamin D, and iron supplements. He is under the care of Dr. Crowe, a urologist, for prostate management. He has no diagnosis of prostate cancer. He is currently on finasteride and Flomax for prostate enlargement, which have been effective in reducing the size of the prostate and improving urinary function. Laboratory tests including CBC, CMP, iron, A1c, vitamin D, and lipid panel were scheduled for today but have been postponed until his current illness resolves.    2. COVID-19 infection.  His sinus discomfort is likely attributable to the COVID-19 infection, although bacterial involvement can not be ruled out. His symptoms appear to be confined to the upper respiratory tract. He has been advised to abstain from public outings until the fifth day of his illness, which would be Thursday, and to wear a mask if he needs to venture out on Friday. A prescription for Paxlovid, to be taken as 2 tablets twice daily for 5 days, has been provided. He has been advised to discontinue atorvastatin while on Paxlovid due to potential drug interactions. Over-the-counter medications such as Mucinex, DayQuil, or NyQuil can be used to alleviate symptoms. If his condition does not improve after a week, he will inform us so that an antibiotic prescription can be considered.    3. Anticoagulation therapy.  He is currently on Eliquis 5 mg twice daily for anticoagulation therapy due to a history of 2 major blood clots. The dosage of Eliquis will be reduced to half a  tablet twice daily for the next 5 days while he is on Paxlovid, after which he can resume his regular dose.  This is due to the effects of Paxlovid on Eliquis.    4. Cholesterol management.  He is on atorvastatin 10 mg daily for cholesterol management. He has been advised to discontinue atorvastatin while on Paxlovid due to potential drug interactions and resume it after completing the Paxlovid course.    Follow-up  The patient is scheduled for a follow-up visit in 3 months.               FOLLOW UP  Return in about 3 months (around 5/24/2025) for Hypertension.  Patient was given instructions and counseling regarding his condition or for health maintenance advice. Please see specific information pulled into the AVS if appropriate.     Patient or patient representative verbalized consent for the use of Ambient Listening during the visit with  Ever Topete DO for chart documentation. 2/24/2025  11:03 VAISHALI Topete DO  02/24/25  11:19 VAISHALI

## 2025-02-27 ENCOUNTER — TELEPHONE (OUTPATIENT)
Dept: FAMILY MEDICINE CLINIC | Facility: CLINIC | Age: 70
End: 2025-02-27
Payer: OTHER GOVERNMENT

## 2025-02-27 DIAGNOSIS — R30.0 DYSURIA: Primary | ICD-10-CM

## 2025-02-27 NOTE — TELEPHONE ENCOUNTER
Caller: Facundo Hinds    Relationship: Self    Best call back number: [phone]    What medication are you requesting: SOMETHING FOR UTI     What are your current symptoms: MEDICATION THAT HE WAS PUT ON HAS LEAD TO A UTI     How long have you been experiencing symptoms: A COUPLE OF DAYS.     Have you had these symptoms before:    [x] Yes  [] No    Have you been treated for these symptoms before:   [x] Yes  [] No    If a prescription is needed, what is your preferred pharmacy and phone number:  [facility].

## 2025-02-28 ENCOUNTER — OFFICE VISIT (OUTPATIENT)
Dept: FAMILY MEDICINE CLINIC | Facility: CLINIC | Age: 70
End: 2025-02-28
Payer: OTHER GOVERNMENT

## 2025-02-28 VITALS
HEIGHT: 70 IN | HEART RATE: 66 BPM | SYSTOLIC BLOOD PRESSURE: 130 MMHG | TEMPERATURE: 98.1 F | BODY MASS INDEX: 37.84 KG/M2 | DIASTOLIC BLOOD PRESSURE: 70 MMHG | OXYGEN SATURATION: 97 % | WEIGHT: 264.3 LBS

## 2025-02-28 DIAGNOSIS — Z86.711 HISTORY OF PULMONARY EMBOLISM: ICD-10-CM

## 2025-02-28 DIAGNOSIS — N39.0 UTI (URINARY TRACT INFECTION), BACTERIAL: ICD-10-CM

## 2025-02-28 DIAGNOSIS — U07.1 COVID-19 VIRUS INFECTION: ICD-10-CM

## 2025-02-28 DIAGNOSIS — R30.0 DYSURIA: Primary | ICD-10-CM

## 2025-02-28 DIAGNOSIS — R31.0 GROSS HEMATURIA: ICD-10-CM

## 2025-02-28 DIAGNOSIS — Z85.528 HISTORY OF RENAL CELL CANCER: ICD-10-CM

## 2025-02-28 DIAGNOSIS — A49.9 UTI (URINARY TRACT INFECTION), BACTERIAL: ICD-10-CM

## 2025-02-28 LAB
BILIRUB BLD-MCNC: NEGATIVE MG/DL
BILIRUB UR QL STRIP: NEGATIVE
CLARITY UR: CLEAR
CLARITY, POC: CLEAR
COLOR UR: YELLOW
COLOR UR: YELLOW
GLUCOSE UR STRIP-MCNC: NEGATIVE MG/DL
GLUCOSE UR STRIP-MCNC: NEGATIVE MG/DL
HGB UR QL STRIP.AUTO: NEGATIVE
HOLD SPECIMEN: NORMAL
KETONES UR QL STRIP: NEGATIVE
KETONES UR QL: NEGATIVE
LEUKOCYTE EST, POC: NEGATIVE
LEUKOCYTE ESTERASE UR QL STRIP.AUTO: NEGATIVE
NITRITE UR QL STRIP: NEGATIVE
NITRITE UR-MCNC: NEGATIVE MG/ML
PH UR STRIP.AUTO: 7 [PH] (ref 5–8)
PH UR: 7 [PH] (ref 5–8)
PROT UR QL STRIP: NEGATIVE
PROT UR STRIP-MCNC: NEGATIVE MG/DL
RBC # UR STRIP: NEGATIVE /UL
SP GR UR STRIP: 1.01 (ref 1–1.03)
SP GR UR: 1.01 (ref 1–1.03)
UROBILINOGEN UR QL STRIP: NORMAL
UROBILINOGEN UR QL: NORMAL

## 2025-02-28 PROCEDURE — 99214 OFFICE O/P EST MOD 30 MIN: CPT | Performed by: STUDENT IN AN ORGANIZED HEALTH CARE EDUCATION/TRAINING PROGRAM

## 2025-02-28 PROCEDURE — 81002 URINALYSIS NONAUTO W/O SCOPE: CPT | Performed by: STUDENT IN AN ORGANIZED HEALTH CARE EDUCATION/TRAINING PROGRAM

## 2025-02-28 PROCEDURE — 81003 URINALYSIS AUTO W/O SCOPE: CPT | Performed by: STUDENT IN AN ORGANIZED HEALTH CARE EDUCATION/TRAINING PROGRAM

## 2025-02-28 RX ORDER — CEPHALEXIN 500 MG/1
500 CAPSULE ORAL 2 TIMES DAILY
Qty: 10 CAPSULE | Refills: 0 | Status: SHIPPED | OUTPATIENT
Start: 2025-02-28 | End: 2025-03-05

## 2025-02-28 NOTE — PROGRESS NOTES
"Chief Complaint  Urinary Tract Infection    Subjective      Facundo Hinds is a 69 y.o. male who presents to [facility]     History of Present Illness  The patient presents for evaluation of dysuria.    Dysuria and Hematuria  - Reports dysuria and hematuria since Wednesday  - History of severe UTIs, including a previous episode requiring a 6-day hospital stay for sepsis  - No new back or renal pain, but has a low-grade fever (~100°F)  - Last hospitalization for UTI involved IV antibiotics and bacteremia in 2024    COVID-19  - Diagnosed with COVID-19 last Saturday  - Currently on Paxlovid  - Feels unwell and fatigued  -Dr. Topete adjusted his medication regimen, discontinuing Eliquis and starting a cholesterol-lowering medication    Supplemental information: History of renal cell carcinoma as well as PE.    MEDICATIONS  Current: Paxlovid         Objective   Vital Signs:   Vitals:    02/28/25 0858   BP: 130/70   BP Location: Right arm   Patient Position: Sitting   Pulse: 66   Temp: 98.1 °F (36.7 °C)   TempSrc: Oral   SpO2: 97%   Weight: 120 kg (264 lb 4.8 oz)   Height: 177.8 cm (70\")     Body mass index is 37.92 kg/m².    Wt Readings from Last 3 Encounters:   02/28/25 120 kg (264 lb 4.8 oz)   02/24/25 122 kg (268 lb 11.2 oz)   11/18/24 119 kg (262 lb 6.4 oz)     BP Readings from Last 3 Encounters:   02/28/25 130/70   02/24/25 126/82   11/18/24 110/70       Health Maintenance   Topic Date Due    COVID-19 Vaccine (1 - 2024-25 season) Never done    INFLUENZA VACCINE  03/31/2025 (Originally 7/1/2024)    BMI FOLLOWUP  05/15/2025    LIPID PANEL  10/03/2025    ANNUAL WELLNESS VISIT  02/24/2026    COLORECTAL CANCER SCREENING  07/26/2028    TDAP/TD VACCINES (3 - Td or Tdap) 10/17/2033    HEPATITIS C SCREENING  Completed    Pneumococcal Vaccine 50+  Completed    ZOSTER VACCINE  Completed       Physical Exam  HENT:      Head: Normocephalic and atraumatic.      Nose: Nose normal.      " Mouth/Throat:      Mouth: Mucous membranes are moist.   Eyes:      Extraocular Movements: Extraocular movements intact.      Conjunctiva/sclera: Conjunctivae normal.   Cardiovascular:      Rate and Rhythm: Normal rate and regular rhythm.      Heart sounds: No murmur heard.     No friction rub. No gallop.   Pulmonary:      Effort: No respiratory distress.      Breath sounds: No wheezing, rhonchi or rales.   Abdominal:      General: Abdomen is flat. There is no distension.      Palpations: Abdomen is soft.      Tenderness: There is no abdominal tenderness. There is no right CVA tenderness or left CVA tenderness.   Musculoskeletal:         General: No swelling.      Cervical back: Neck supple.   Skin:     General: Skin is warm and dry.   Neurological:      General: No focal deficit present.      Mental Status: He is alert and oriented to person, place, and time.   Psychiatric:         Mood and Affect: Mood normal.         Behavior: Behavior normal.         Thought Content: Thought content normal.         Judgment: Judgment normal.          Physical Exam      Result Review :  The following data was reviewed by: Jeff Estes DO on 02/28/2025:    No Images in the past 120 days found..     Results         Procedures          Diagnoses and all orders for this visit:    1. Dysuria (Primary)  -     POCT urinalysis dipstick, manual  -     Urinalysis With Culture If Indicated - Urine, Clean Catch    2. Gross hematuria  -     Urinalysis With Culture If Indicated - Urine, Clean Catch    3. COVID-19 virus infection    4. UTI (urinary tract infection), bacterial  -     cephalexin (KEFLEX) 500 MG capsule; Take 1 capsule by mouth 2 (Two) Times a Day for 5 days.  Dispense: 10 capsule; Refill: 0    5. History of pulmonary embolism    6. History of renal cell cancer         Assessment & Plan  1. Dysuria.  Reports burning with urination and hematuria since Wednesday. Suspected UTI given history and symptoms.  Urine dipstick was  cleaned today although due to patient's history of urosepsis 1 year ago with similar symptoms, will start treatment at this point and wait for urinalysis with culture.  Prescribed Keflex 500 mg BID for 5 days. Will adjust antibiotics if culture indicates resistance. If no improvement, treatment plan will be reassessed.    2. COVID-19.  Tested positive last Saturday, experiencing fatigue and low-grade fever. On Paxlovid as prescribed by Dr. Topete. Continue current medication regimen and monitor symptoms closely.  Patient still reduced his Eliquis as discussed by Dr. Topete and not taking atorvastatin with Paxlovid.    PROCEDURE  The patient underwent a right nephrectomy for renal cell carcinoma, which involved the removal of a 5-pound tumor.              FOLLOW UP  Return if symptoms worsen or fail to improve.  Patient was given instructions and counseling regarding his condition or for health maintenance advice. Please see specific information pulled into the AVS if appropriate.     Patient or patient representative verbalized consent for the use of Ambient Listening during the visit with  Jeff Estes DO for chart documentation. 2/28/2025  09:29 EST    Jeff Estes DO  02/28/25  09:29 EST    CURRENT & DISCONTINUED MEDICATIONS  Current Outpatient Medications   Medication Instructions    acetaminophen (TYLENOL) 500-1,000 mg, Every 6 Hours PRN    apixaban (ELIQUIS) 5 mg, Oral, 2 Times Daily    atorvastatin (LIPITOR) 10 mg, Oral, Daily    cephalexin (KEFLEX) 500 mg, Oral, 2 Times Daily    cetirizine (ZYRTEC) 10 mg, Oral, Daily    cholecalciferol (VITAMIN D3) 1,000 Units, Daily    ferrous sulfate 325 mg, Daily    finasteride (PROSCAR) 5 mg, Daily    fluticasone (Flonase) 50 MCG/ACT nasal spray 2 sprays, Nasal, Daily    lisinopril-hydrochlorothiazide (PRINZIDE,ZESTORETIC) 20-25 MG per tablet 1.5 tablets, Oral, Daily    multivitamin with minerals tablet tablet 1 tablet, Daily    Nirmatrelvir & Ritonavir,  300mg/100mg, (PAXLOVID) 2 tablets, Oral, 2 Times Daily    tamsulosin (FLOMAX) 0.4 MG capsule 24 hr capsule 1 capsule, Daily       There are no discontinued medications.

## 2025-03-03 NOTE — TELEPHONE ENCOUNTER
It looks like the patient was seen on 2/28/2025 at this office.  He was placed on Keflex by Dr. Estes.  The urinalysis came back normal.  I recommend repeating the study given the concern about urinary tract infection.  Have him come in at his convenience to get this done.

## 2025-04-14 RX ORDER — LISINOPRIL AND HYDROCHLOROTHIAZIDE 20; 25 MG/1; MG/1
1.5 TABLET ORAL DAILY
Qty: 90 TABLET | Refills: 3 | Status: SHIPPED | OUTPATIENT
Start: 2025-04-14

## 2025-05-27 ENCOUNTER — OFFICE VISIT (OUTPATIENT)
Dept: FAMILY MEDICINE CLINIC | Facility: CLINIC | Age: 70
End: 2025-05-27
Payer: MEDICARE

## 2025-05-27 VITALS
WEIGHT: 265.9 LBS | HEIGHT: 70 IN | HEART RATE: 54 BPM | DIASTOLIC BLOOD PRESSURE: 70 MMHG | OXYGEN SATURATION: 95 % | BODY MASS INDEX: 38.07 KG/M2 | TEMPERATURE: 98 F | SYSTOLIC BLOOD PRESSURE: 118 MMHG

## 2025-05-27 DIAGNOSIS — Z90.5 HISTORY OF NEPHRECTOMY: Primary | ICD-10-CM

## 2025-05-27 DIAGNOSIS — Z86.718 HISTORY OF DVT (DEEP VEIN THROMBOSIS): ICD-10-CM

## 2025-05-27 DIAGNOSIS — E61.1 IRON DEFICIENCY: ICD-10-CM

## 2025-05-27 DIAGNOSIS — Z85.528 HISTORY OF RENAL CELL CANCER: ICD-10-CM

## 2025-05-27 DIAGNOSIS — R73.03 PREDIABETES: ICD-10-CM

## 2025-05-27 DIAGNOSIS — R30.0 DYSURIA: ICD-10-CM

## 2025-05-27 DIAGNOSIS — N18.31 STAGE 3A CHRONIC KIDNEY DISEASE: ICD-10-CM

## 2025-05-27 DIAGNOSIS — I10 ESSENTIAL HYPERTENSION: ICD-10-CM

## 2025-05-27 DIAGNOSIS — D64.9 ANEMIA, UNSPECIFIED TYPE: ICD-10-CM

## 2025-05-27 DIAGNOSIS — E55.9 VITAMIN D DEFICIENCY: ICD-10-CM

## 2025-05-27 DIAGNOSIS — J30.9 ALLERGIC RHINITIS, UNSPECIFIED SEASONALITY, UNSPECIFIED TRIGGER: ICD-10-CM

## 2025-05-27 DIAGNOSIS — E78.2 MIXED HYPERLIPIDEMIA: ICD-10-CM

## 2025-05-27 LAB
25(OH)D3 SERPL-MCNC: 43.3 NG/ML (ref 30–100)
ALBUMIN SERPL-MCNC: 4.2 G/DL (ref 3.5–5.2)
ALBUMIN/GLOB SERPL: 1.2 G/DL
ALP SERPL-CCNC: 95 U/L (ref 39–117)
ALT SERPL W P-5'-P-CCNC: 23 U/L (ref 1–41)
ANION GAP SERPL CALCULATED.3IONS-SCNC: 10.7 MMOL/L (ref 5–15)
AST SERPL-CCNC: 20 U/L (ref 1–40)
BACTERIA UR QL AUTO: NORMAL /HPF
BASOPHILS # BLD AUTO: 0.04 10*3/MM3 (ref 0–0.2)
BASOPHILS NFR BLD AUTO: 0.6 % (ref 0–1.5)
BILIRUB SERPL-MCNC: 0.3 MG/DL (ref 0–1.2)
BILIRUB UR QL STRIP: NEGATIVE
BUN SERPL-MCNC: 13 MG/DL (ref 8–23)
BUN/CREAT SERPL: 9.8 (ref 7–25)
CALCIUM SPEC-SCNC: 9.5 MG/DL (ref 8.6–10.5)
CHLORIDE SERPL-SCNC: 99 MMOL/L (ref 98–107)
CHOLEST SERPL-MCNC: 142 MG/DL (ref 0–200)
CLARITY UR: CLEAR
CO2 SERPL-SCNC: 27.3 MMOL/L (ref 22–29)
COLOR UR: YELLOW
CREAT SERPL-MCNC: 1.33 MG/DL (ref 0.76–1.27)
DEPRECATED RDW RBC AUTO: 43.2 FL (ref 37–54)
EGFRCR SERPLBLD CKD-EPI 2021: 58.2 ML/MIN/1.73
EOSINOPHIL # BLD AUTO: 0.21 10*3/MM3 (ref 0–0.4)
EOSINOPHIL NFR BLD AUTO: 3 % (ref 0.3–6.2)
ERYTHROCYTE [DISTWIDTH] IN BLOOD BY AUTOMATED COUNT: 12.2 % (ref 12.3–15.4)
GLOBULIN UR ELPH-MCNC: 3.5 GM/DL
GLUCOSE SERPL-MCNC: 90 MG/DL (ref 65–99)
GLUCOSE UR STRIP-MCNC: NEGATIVE MG/DL
HBA1C MFR BLD: 6.1 % (ref 4.8–5.6)
HCT VFR BLD AUTO: 38 % (ref 37.5–51)
HDLC SERPL-MCNC: 61 MG/DL (ref 40–60)
HGB BLD-MCNC: 12.6 G/DL (ref 13–17.7)
HGB UR QL STRIP.AUTO: NEGATIVE
HYALINE CASTS UR QL AUTO: NORMAL /LPF
IMM GRANULOCYTES # BLD AUTO: 0.02 10*3/MM3 (ref 0–0.05)
IMM GRANULOCYTES NFR BLD AUTO: 0.3 % (ref 0–0.5)
IRON 24H UR-MRATE: 70 MCG/DL (ref 59–158)
IRON SATN MFR SERPL: 23 % (ref 20–50)
KETONES UR QL STRIP: ABNORMAL
LDLC SERPL CALC-MCNC: 61 MG/DL (ref 0–100)
LDLC/HDLC SERPL: 0.97 {RATIO}
LEUKOCYTE ESTERASE UR QL STRIP.AUTO: NEGATIVE
LYMPHOCYTES # BLD AUTO: 1.97 10*3/MM3 (ref 0.7–3.1)
LYMPHOCYTES NFR BLD AUTO: 28.3 % (ref 19.6–45.3)
MCH RBC QN AUTO: 31.9 PG (ref 26.6–33)
MCHC RBC AUTO-ENTMCNC: 33.2 G/DL (ref 31.5–35.7)
MCV RBC AUTO: 96.2 FL (ref 79–97)
MONOCYTES # BLD AUTO: 0.69 10*3/MM3 (ref 0.1–0.9)
MONOCYTES NFR BLD AUTO: 9.9 % (ref 5–12)
NEUTROPHILS NFR BLD AUTO: 4.03 10*3/MM3 (ref 1.7–7)
NEUTROPHILS NFR BLD AUTO: 57.9 % (ref 42.7–76)
NITRITE UR QL STRIP: NEGATIVE
NRBC BLD AUTO-RTO: 0 /100 WBC (ref 0–0.2)
PH UR STRIP.AUTO: 6.5 [PH] (ref 5–8)
PLATELET # BLD AUTO: 221 10*3/MM3 (ref 140–450)
PMV BLD AUTO: 10.6 FL (ref 6–12)
POTASSIUM SERPL-SCNC: 4 MMOL/L (ref 3.5–5.2)
PROT SERPL-MCNC: 7.7 G/DL (ref 6–8.5)
PROT UR QL STRIP: NEGATIVE
RBC # BLD AUTO: 3.95 10*6/MM3 (ref 4.14–5.8)
RBC # UR STRIP: NORMAL /HPF
REF LAB TEST METHOD: NORMAL
SODIUM SERPL-SCNC: 137 MMOL/L (ref 136–145)
SP GR UR STRIP: 1.02 (ref 1–1.03)
SQUAMOUS #/AREA URNS HPF: NORMAL /HPF
TIBC SERPL-MCNC: 299 MCG/DL (ref 298–536)
TRANSFERRIN SERPL-MCNC: 201 MG/DL (ref 200–360)
TRIGL SERPL-MCNC: 110 MG/DL (ref 0–150)
UROBILINOGEN UR QL STRIP: ABNORMAL
VLDLC SERPL-MCNC: 20 MG/DL (ref 5–40)
WBC # UR STRIP: NORMAL /HPF
WBC NRBC COR # BLD AUTO: 6.96 10*3/MM3 (ref 3.4–10.8)

## 2025-05-27 PROCEDURE — 80061 LIPID PANEL: CPT | Performed by: FAMILY MEDICINE

## 2025-05-27 PROCEDURE — 85025 COMPLETE CBC W/AUTO DIFF WBC: CPT | Performed by: FAMILY MEDICINE

## 2025-05-27 PROCEDURE — 87086 URINE CULTURE/COLONY COUNT: CPT | Performed by: FAMILY MEDICINE

## 2025-05-27 PROCEDURE — 83036 HEMOGLOBIN GLYCOSYLATED A1C: CPT | Performed by: FAMILY MEDICINE

## 2025-05-27 PROCEDURE — 99214 OFFICE O/P EST MOD 30 MIN: CPT | Performed by: FAMILY MEDICINE

## 2025-05-27 PROCEDURE — 80053 COMPREHEN METABOLIC PANEL: CPT | Performed by: FAMILY MEDICINE

## 2025-05-27 PROCEDURE — 81001 URINALYSIS AUTO W/SCOPE: CPT | Performed by: FAMILY MEDICINE

## 2025-05-27 PROCEDURE — 84466 ASSAY OF TRANSFERRIN: CPT | Performed by: FAMILY MEDICINE

## 2025-05-27 PROCEDURE — 83540 ASSAY OF IRON: CPT | Performed by: FAMILY MEDICINE

## 2025-05-27 PROCEDURE — 82306 VITAMIN D 25 HYDROXY: CPT | Performed by: FAMILY MEDICINE

## 2025-05-27 PROCEDURE — 36415 COLL VENOUS BLD VENIPUNCTURE: CPT | Performed by: FAMILY MEDICINE

## 2025-05-27 RX ORDER — IPRATROPIUM BROMIDE 21 UG/1
2 SPRAY, METERED NASAL 3 TIMES DAILY
Qty: 30 ML | Refills: 0 | Status: SHIPPED | OUTPATIENT
Start: 2025-05-27

## 2025-05-27 RX ORDER — MONTELUKAST SODIUM 10 MG/1
10 TABLET ORAL NIGHTLY
Qty: 90 TABLET | Refills: 3 | Status: SHIPPED | OUTPATIENT
Start: 2025-05-27

## 2025-05-27 NOTE — PROGRESS NOTES
Chief Complaint  Hypertension    Subjective          Facundo Hinds presents to [facility]    History of Present Illness     History of Present Illness  The patient is a 70-year-old male who presents today for follow-up.    He has been experiencing sinus-related discomfort, which he attributes to allergies. He reports persistent nasal drainage. His current regimen includes Tylenol Sinus, Flonase, and Zyrtec.    He has discontinued the use of finasteride and Flomax due to adverse side effects, including fatigue. He also expressed concern about the potential impact of these medications on his renal function, given his history of right nephrectomy. He has an upcoming appointment with Dr. Crowe in 09/2025.    He has a scheduled appointment with nephrology in 07/2025.    He continues to take atorvastatin 10 mg daily for cholesterol management.    He has increased his iron intake, consuming two gummies in the morning and two in the evening, as his levels are marginally within the normal range.    He is currently on a regimen of lisinopril hydrochlorothiazide 20/25, taking 1.5 tablets daily.    He is still taking Eliquis due to his history of blood clots.    His weight has remained stable.    PAST SURGICAL HISTORY:  Right nephrectomy         Current Outpatient Medications   Medication Instructions    acetaminophen (TYLENOL) 500-1,000 mg, Every 6 Hours PRN    apixaban (ELIQUIS) 5 mg, Oral, 2 Times Daily    atorvastatin (LIPITOR) 10 mg, Oral, Daily    cetirizine (ZYRTEC) 10 mg, Oral, Daily    cholecalciferol (VITAMIN D3) 1,000 Units, Daily    ferrous sulfate 325 mg, Daily    finasteride (PROSCAR) 5 mg, Daily    fluticasone (Flonase) 50 MCG/ACT nasal spray 2 sprays, Nasal, Daily    ipratropium (ATROVENT) 0.03 % nasal spray 2 sprays, Nasal, 3 Times Daily    lisinopril-hydrochlorothiazide (PRINZIDE,ZESTORETIC) 20-25 MG per tablet 1.5 tablets, Oral, Daily    montelukast (SINGULAIR) 10 mg,  "Oral, Nightly    multivitamin with minerals tablet tablet 1 tablet, Daily    tamsulosin (FLOMAX) 0.4 MG capsule 24 hr capsule 1 capsule, Daily       The following portions of the patient's history were reviewed and updated as appropriate: allergies, current medications, past family history, past medical history, past social history, past surgical history, and problem list.    Objective   Vital Signs:   /70   Pulse 54   Temp 98 °F (36.7 °C) (Oral)   Ht 177.8 cm (70\")   Wt 121 kg (265 lb 14.4 oz)   SpO2 95%   BMI 38.15 kg/m²     BP Readings from Last 3 Encounters:   05/27/25 118/70   02/28/25 130/70   02/24/25 126/82     Wt Readings from Last 3 Encounters:   05/27/25 121 kg (265 lb 14.4 oz)   02/28/25 120 kg (264 lb 4.8 oz)   02/24/25 122 kg (268 lb 11.2 oz)     Class 2 Severe Obesity (BMI >=35 and <=39.9). Obesity-related health conditions include the following: hypertension. Obesity is unchanged. BMI is is above average; BMI management plan is completed. We discussed portion control and increasing exercise.     Physical Exam  Vitals reviewed.   Constitutional:       Appearance: Normal appearance.   HENT:      Head: Normocephalic and atraumatic.      Right Ear: External ear normal.      Left Ear: External ear normal.      Nose: Congestion and rhinorrhea present.      Mouth/Throat:      Mouth: Mucous membranes are moist.   Eyes:      Conjunctiva/sclera: Conjunctivae normal.   Cardiovascular:      Rate and Rhythm: Normal rate and regular rhythm.      Heart sounds: No murmur heard.     No friction rub. No gallop.   Pulmonary:      Effort: Pulmonary effort is normal.      Breath sounds: Normal breath sounds. No wheezing or rhonchi.   Abdominal:      General: Bowel sounds are normal. There is no distension.      Palpations: Abdomen is soft.      Tenderness: There is no abdominal tenderness.   Skin:     General: Skin is warm and dry.   Neurological:      Mental Status: He is alert and oriented to person, place, " and time.      Cranial Nerves: No cranial nerve deficit.   Psychiatric:         Mood and Affect: Mood and affect normal.         Behavior: Behavior normal.         Thought Content: Thought content normal.         Judgment: Judgment normal.            Result Review :   The following data was reviewed by: Ever Topete DO on 05/27/2025:  Common labs          7/2/2024    09:02 10/3/2024    08:31   Common Labs   Glucose 102  100    BUN 15  17    Creatinine 1.25  1.37    Sodium 139  141    Potassium 3.7  3.7    Chloride 102  104    Calcium 9.4  9.3    Albumin 3.9  3.9    Total Bilirubin <0.2  0.3    Alkaline Phosphatase 97  91    AST (SGOT) 20  19    ALT (SGPT) 22  22    WBC 5.85  6.16    Hemoglobin 12.5  12.2    Hematocrit 37.9  35.8    Platelets 220  195    Total Cholesterol 138  132    Triglycerides 89  64    HDL Cholesterol 69  62    LDL Cholesterol  52  57    Hemoglobin A1C 6.40  5.90             Lab Results   Component Value Date    SARSANTIGEN Detected (A) 02/24/2025    COVID19 NOT DETECTED 01/19/2021    RAPFLUA Negative 05/17/2022    RAPFLUB Negative 05/17/2022    FLUAAG Not Detected 02/24/2025    FLUBAG Not Detected 02/24/2025    INR 1.13 (L) 01/19/2021    BILIRUBINUR Negative 02/28/2025       Results  Labs   - Urine test: No blood   - A1c: 5.9%    Procedures        Assessment and Plan    Diagnoses and all orders for this visit:    1. History of nephrectomy (Primary)    2. History of renal cell cancer    3. Allergic rhinitis, unspecified seasonality, unspecified trigger    4. Essential hypertension    5. History of DVT (deep vein thrombosis)    6. Prediabetes    7. Mixed hyperlipidemia    8. Iron deficiency    9. Stage 3a chronic kidney disease    Other orders  -     montelukast (Singulair) 10 MG tablet; Take 1 tablet by mouth Every Night.  Dispense: 90 tablet; Refill: 3  -     ipratropium (ATROVENT) 0.03 % nasal spray; Administer 2 sprays into the nostril(s) as directed by provider 3 (Three) Times a Day.   Dispense: 30 mL; Refill: 0        Assessment & Plan  1. Allergic Rhinitis.  - Persistent nasal drainage despite taking Tylenol Sinus, Flonase, and Zyrtec.  - Singulair will be added to his regimen and sent to pharmacy. Ipratropium bromide nasal spray prescribed for use as needed, with instructions to administer 2 sprays in each nostril three times daily.  - Continues current medications, including Zyrtec and Flonase.  - Discussed the impact of pollen and environmental factors on symptoms.    2. Hypertension.  - Blood pressure readings are within the normal range today.  - Currently taking lisinopril hydrochlorothiazide 20/25 mg, 1.5 tablets daily.  - No changes to hypertension management plan necessary at this time.  - Blood pressure monitoring will continue.    3. Anticoagulation Management.  - Continues to take Eliquis due to history of blood clots.  - No changes to anticoagulation therapy necessary at this time.  - Reviewed medication adherence and potential side effects.    4. Prediabetes.  - Last A1c was 5.9%, within the prediabetic range; ten months ago, it was 6.4%.  - Advised to maintain a balanced diet and regular exercise to prevent progression to diabetes.  - A1c test will be conducted today to monitor glucose levels.  - Discussed importance of lifestyle modifications in managing prediabetes.    5. Hyperlipidemia.  - Currently taking atorvastatin 10 mg daily for cholesterol management.  - No changes to lipid management plan necessary at this time.  - Reviewed medication adherence and potential side effects.    6. Iron Deficiency.  - Doubled iron intake, taking two gummies in the morning and two in the evening.  - Iron level test will be conducted today to monitor status.  - Discussed importance of monitoring iron levels and potential side effects of increased intake.    7. Prostate Management.  - Discontinued finasteride and Flomax due to side effects.  - Follow-up with Dr. Crowe in September for further  evaluation and management of prostate condition.  - Discussed potential alternative treatments and monitoring symptoms.    8. Nephrology Follow-up.  - Upcoming appointment with nephrology in July to ensure proper kidney function, given history of right kidney removal.  - Reviewed importance of nephrology follow-up and monitoring kidney function.    Follow-up  - Follow up in 5 months.       Medications Discontinued During This Encounter   Medication Reason    Nirmatrelvir & Ritonavir, 300mg/100mg, (PAXLOVID)           Follow Up   Return in about 5 months (around 10/27/2025) for hypertension.  Patient was given instructions and counseling regarding his condition or for health maintenance advice. Please see specific information pulled into the AVS if appropriate.     Patient or patient representative verbalized consent for the use of Ambient Listening during the visit with  Ever Topete DO for chart documentation. 5/27/2025  13:04 EDT    Ever Topete DO  05/27/25  13:15 EDT

## 2025-05-29 LAB — BACTERIA SPEC AEROBE CULT: NO GROWTH

## 2025-06-08 DIAGNOSIS — J30.9 ALLERGIC RHINITIS, UNSPECIFIED SEASONALITY, UNSPECIFIED TRIGGER: ICD-10-CM

## 2025-06-09 RX ORDER — CETIRIZINE HYDROCHLORIDE 10 MG/1
10 TABLET ORAL DAILY
Qty: 90 TABLET | Refills: 3 | Status: SHIPPED | OUTPATIENT
Start: 2025-06-09

## 2025-06-09 RX ORDER — ATORVASTATIN CALCIUM 10 MG/1
10 TABLET, FILM COATED ORAL DAILY
Qty: 90 TABLET | Refills: 3 | Status: SHIPPED | OUTPATIENT
Start: 2025-06-09

## 2025-06-09 RX ORDER — LISINOPRIL AND HYDROCHLOROTHIAZIDE 20; 25 MG/1; MG/1
1.5 TABLET ORAL DAILY
Qty: 90 TABLET | Refills: 3 | Status: SHIPPED | OUTPATIENT
Start: 2025-06-09

## 2025-07-02 ENCOUNTER — LAB (OUTPATIENT)
Facility: HOSPITAL | Age: 70
End: 2025-07-02
Payer: MEDICARE

## 2025-07-02 ENCOUNTER — TRANSCRIBE ORDERS (OUTPATIENT)
Dept: ADMINISTRATIVE | Facility: HOSPITAL | Age: 70
End: 2025-07-02
Payer: MEDICARE

## 2025-07-02 DIAGNOSIS — N18.31 CHRONIC KIDNEY DISEASE (CKD) STAGE G3A/A1, MODERATELY DECREASED GLOMERULAR FILTRATION RATE (GFR) BETWEEN 45-59 ML/MIN/1.73 SQUARE METER AND ALBUMINURIA CREATININE RATIO LESS THAN 30 MG/G (CMS/H*: ICD-10-CM

## 2025-07-02 DIAGNOSIS — N18.31 CHRONIC KIDNEY DISEASE (CKD) STAGE G3A/A1, MODERATELY DECREASED GLOMERULAR FILTRATION RATE (GFR) BETWEEN 45-59 ML/MIN/1.73 SQUARE METER AND ALBUMINURIA CREATININE RATIO LESS THAN 30 MG/G (CMS/H*: Primary | ICD-10-CM

## 2025-07-02 LAB
ANION GAP SERPL CALCULATED.3IONS-SCNC: 11.3 MMOL/L (ref 5–15)
BACTERIA UR QL AUTO: ABNORMAL /HPF
BILIRUB UR QL STRIP: NEGATIVE
BUN SERPL-MCNC: 14 MG/DL (ref 8–23)
BUN/CREAT SERPL: 10.5 (ref 7–25)
CALCIUM SPEC-SCNC: 9 MG/DL (ref 8.6–10.5)
CHLORIDE SERPL-SCNC: 101 MMOL/L (ref 98–107)
CLARITY UR: CLEAR
CO2 SERPL-SCNC: 24.7 MMOL/L (ref 22–29)
COLOR UR: YELLOW
CREAT SERPL-MCNC: 1.33 MG/DL (ref 0.76–1.27)
CREAT UR-MCNC: 98.2 MG/DL
EGFRCR SERPLBLD CKD-EPI 2021: 58.2 ML/MIN/1.73
GLUCOSE SERPL-MCNC: 115 MG/DL (ref 65–99)
GLUCOSE UR STRIP-MCNC: NEGATIVE MG/DL
HGB UR QL STRIP.AUTO: NEGATIVE
HYALINE CASTS UR QL AUTO: ABNORMAL /LPF
KETONES UR QL STRIP: NEGATIVE
LEUKOCYTE ESTERASE UR QL STRIP.AUTO: ABNORMAL
NITRITE UR QL STRIP: NEGATIVE
PH UR STRIP.AUTO: 6 [PH] (ref 5–8)
PHOSPHATE SERPL-MCNC: 3 MG/DL (ref 2.5–4.5)
POTASSIUM SERPL-SCNC: 3.6 MMOL/L (ref 3.5–5.2)
PROT ?TM UR-MCNC: 10.3 MG/DL
PROT UR QL STRIP: NEGATIVE
PROT/CREAT UR: 0.1 MG/G{CREAT}
RBC # UR STRIP: ABNORMAL /HPF
REF LAB TEST METHOD: ABNORMAL
SODIUM SERPL-SCNC: 137 MMOL/L (ref 136–145)
SP GR UR STRIP: 1.01 (ref 1–1.03)
SQUAMOUS #/AREA URNS HPF: ABNORMAL /HPF
UROBILINOGEN UR QL STRIP: ABNORMAL
WBC # UR STRIP: ABNORMAL /HPF

## 2025-07-02 PROCEDURE — 81001 URINALYSIS AUTO W/SCOPE: CPT

## 2025-07-02 PROCEDURE — 84100 ASSAY OF PHOSPHORUS: CPT

## 2025-07-02 PROCEDURE — 84156 ASSAY OF PROTEIN URINE: CPT

## 2025-07-02 PROCEDURE — 80048 BASIC METABOLIC PNL TOTAL CA: CPT

## 2025-07-02 PROCEDURE — 82570 ASSAY OF URINE CREATININE: CPT

## 2025-07-02 PROCEDURE — 36415 COLL VENOUS BLD VENIPUNCTURE: CPT

## 2025-07-14 RX ORDER — FLUTICASONE PROPIONATE 50 MCG
2 SPRAY, SUSPENSION (ML) NASAL DAILY
Qty: 48 G | Refills: 3 | Status: SHIPPED | OUTPATIENT
Start: 2025-07-14

## (undated) DEVICE — SNAR POLYP CAPTIFLEX XS/OVL 11X2.4MM 240CM 1P/U

## (undated) DEVICE — SINGLE-USE BIOPSY FORCEPS: Brand: RADIAL JAW 4

## (undated) DEVICE — THE SINGLE USE ETRAP – POLYP TRAP IS USED FOR SUCTION RETRIEVAL OF ENDOSCOPICALLY REMOVED POLYPS.: Brand: ETRAP

## (undated) DEVICE — SOL IRRG H2O PL/BG 1000ML STRL

## (undated) DEVICE — SOLIDIFIER LIQLOC PLS 1500CC BT

## (undated) DEVICE — PAD GRND REM POLYHESIVE A/ DISP

## (undated) DEVICE — LINER SURG CANSTR SXN S/RIGD 1500CC

## (undated) DEVICE — Device

## (undated) DEVICE — Device: Brand: DEFENDO AIR/WATER/SUCTION AND BIOPSY VALVE

## (undated) DEVICE — CONN JET HYDRA H20 AUXILIARY DISP